# Patient Record
Sex: FEMALE | Race: WHITE | Employment: FULL TIME | URBAN - METROPOLITAN AREA
[De-identification: names, ages, dates, MRNs, and addresses within clinical notes are randomized per-mention and may not be internally consistent; named-entity substitution may affect disease eponyms.]

---

## 2017-02-02 ENCOUNTER — OFFICE VISIT (OUTPATIENT)
Dept: OBGYN | Facility: CLINIC | Age: 32
End: 2017-02-02
Payer: COMMERCIAL

## 2017-02-02 VITALS
BODY MASS INDEX: 45.79 KG/M2 | HEART RATE: 118 BPM | DIASTOLIC BLOOD PRESSURE: 88 MMHG | WEIGHT: 293 LBS | SYSTOLIC BLOOD PRESSURE: 148 MMHG | OXYGEN SATURATION: 96 %

## 2017-02-02 DIAGNOSIS — D06.9 CIN III (CERVICAL INTRAEPITHELIAL NEOPLASIA III): ICD-10-CM

## 2017-02-02 DIAGNOSIS — R87.619 ABNORMAL CERVICAL PAPANICOLAOU SMEAR, UNSPECIFIED ABNORMAL PAP FINDING: Primary | ICD-10-CM

## 2017-02-02 PROCEDURE — 88305 TISSUE EXAM BY PATHOLOGIST: CPT | Performed by: OBSTETRICS & GYNECOLOGY

## 2017-02-02 PROCEDURE — 57454 BX/CURETT OF CERVIX W/SCOPE: CPT | Performed by: OBSTETRICS & GYNECOLOGY

## 2017-02-02 PROCEDURE — 99243 OFF/OP CNSLTJ NEW/EST LOW 30: CPT | Mod: 25 | Performed by: OBSTETRICS & GYNECOLOGY

## 2017-02-02 NOTE — NURSING NOTE
"Chief Complaint   Patient presents with     Colposcopy       Initial /88 mmHg  Pulse 118  Wt 299 lb (135.626 kg)  SpO2 96%  LMP 12/25/2016 Estimated body mass index is 45.79 kg/(m^2) as calculated from the following:    Height as of 11/25/16: 5' 7.75\" (1.721 m).    Weight as of this encounter: 299 lb (135.626 kg).  BP completed using cuff size: mark Rome LPN    "

## 2017-02-02 NOTE — PROGRESS NOTES
Patient Name: Yvonne Jordan              Date: 2/2/2017   YOB: 1985                         Age: 31 year old   Phone: [phone] (home)   ________________________________________________________________________  I have been asked to see Yvonne in consultation by Sophia Gallegos  to discuss the pap smear, findings and possible further evaluation.  The patient's pap history is as follows:  12/2005:Pap--ASC-US +HPV  1/2006:[place]--HSIL, NATHAN 2-3  2/2006:[facility]--HSIL  7/2006:[place]--NATHAN 1  2/2007:Pap--LSIL  3/2007:[place]  8/2007:Pap--LSIL  10/2007:Pap--LSIL  12/2007:[place]--Negative. Rpt in 6 mon 8/2008:Pap--LSIL  12/21/09:Pap--NIL. Rpt in 6 mon.  9/29/10:Pap--NIL. Rpt in 6 mon.  7/28/11:Pap--NIL. Rpt in one year. In , episode, history and problem list updated, reminder sent.   2/16/12: Pap--NIL. Repeat pap in 1 year. Pap tracking episode resolved.  10/31/14: ASCUS Pap, + HR HPV. ([facility])  12/31/14: [place] Bx - NATHAN 1, ECC - negative ([facility])  8/28/15: Pap - NIL, + HR HPV. Plan cotest in 1 year.  11/25/16: NIL Pap, + HR HPV. Plan colp    I attempted to ensure that the patient was educated regarding the nature of her findings and implications to date.  We reviewed the role of HPV, incidence in the population and the natural history of the infection, and its transmission.  We also reviewed ways to minimize her future risk, the effect of HPV on the cervix and treatment options available, should they be indicated.    The pathophysiology of the cervix, including a discussion of the squamous and columnar cells, metaplasia and dysplasia have been reviewed, drawings, sketches and the pamphlets were reviewed with her.      Patient's last menstrual period was 12/25/2016.  Current Birth Control Method: abstinence  Family History of  Cervical, Uterine or Vaginal Cancer?: No    Past Medical History   Diagnosis Date     Menstrual migraine      Abnormal Pap smear      S/P [facility] biopsy of cervix 2/2006     HSIL NATHAN 2-3      Thyroid disease      Cervical high risk HPV (human papillomavirus) test positive 8/2015, 11/25/16     Neg 16/18       Past Surgical History   Procedure Laterality Date     Mouth surgery       wisdom teeth extraction     Cosmetic mammoplasty augmentation       Colposcopy, biopsy, combined  2009     Conization  2005        Outpatient Encounter Prescriptions as of 2/2/2017   Medication Sig Dispense Refill     lisinopril-hydrochlorothiazide (PRINZIDE,ZESTORETIC) 10-12.5 MG per tablet Take 1 tablet by mouth daily 90 tablet 3     multivitamin, therapeutic with minerals (MULTI-VITAMIN) TABS Take 1 tablet by mouth daily       levothyroxine (SYNTHROID, LEVOTHROID) 50 MCG tablet Take 1 tablet (50 mcg) by mouth daily 90 tablet 3     No facility-administered encounter medications on file as of 2/2/2017.        Allergies as of 02/02/2017 - reviewed 02/02/2017   Allergen Reaction Noted     Nkda [no known drug allergies]  12/28/2010       Social History     Social History     Marital Status: Single     Spouse Name: N/A     Number of Children: N/A     Years of Education: N/A     Social History Main Topics     Smoking status: Never Smoker      Smokeless tobacco: Never Used     Alcohol Use: Yes      Comment: very rare     Drug Use: No     Sexual Activity:     Partners: Male     Birth Control/ Protection: Condom      Comment: Microgestin FE BCP     Other Topics Concern      Service No     Blood Transfusions No     Caffeine Concern No     coffee 1 cup per day; pop 1 per day     Occupational Exposure No     Hobby Hazards No     Sleep Concern Yes     Stress Concern Yes     Weight Concern Yes     Special Diet No     Back Care No     Exercise Yes     2 x per wk- WII fit     Bike Helmet Not Asked     N/A     Seat Belt Yes     100%     Self-Exams Yes     Social History Narrative        Family History   Problem Relation Age of Onset     Arthritis Mother      Thyroid Disease Mother      CANCER Maternal Grandmother      Hypertension  Maternal Grandmother      CANCER Paternal Grandmother      Non-Hodgkins Lymphoma     CANCER Paternal Grandfather      Hypertension Father          Review Of Systems  Skin: negative  Eyes: negative  Ears/Nose/Throat: negative  Respiratory: No shortness of breath, dyspnea on exertion, cough, or hemoptysis and negative  Cardiovascular: negative  Gastrointestinal: negative  Genitourinary: negative  Musculoskeletal: negative  Neurologic: negative  Psychiatric: negative  Hematologic/Lymphatic/Immunologic: negative  Endocrine:Irreguler menses. Off OCP. No menses since 12/16    Exam:   /88 mmHg  Pulse 118  Wt 299 lb (135.626 kg)  SpO2 96%  LMP 12/25/2016  GENERAL:  WNWD female NAD  HEENT: NC/AT, EOMI  SKIN: normal skin turgor  GAIT: Normal  NECK: Symmetrical, no masses noted   VULVA: Normal Genitalia  BUS: Normal  URETHRA:  No hypermobility noted  URETHRAL MEATUS:  No masses noted  VAGINA: Normal mucosa, no discharge  CERVIX: Closed, mobile, no discharge  PERIANAL:  No masses or lesions seen  EXTREMITIES: no clubbing, cyanosis, or edema    Assessment:    ICD-10-CM    1. Abnormal cervical Papanicolaou smear, unspecified abnormal pap finding R87.619      Positive HPV    Plan:  Recommend to Proceed with Colpo  The details of the colposcopic procedure were reviewed, the risks of missed diagnoses, pain, infection, and bleeding.    TT 30 min, in addition to the time for the procedure  CT greater than 50%, as noted above in the HPI and in the Plan.     CEPHAS AGBEH, MD.          Procedure:  Speculum placed in vagina and excellent visualization of cervix achieved, cervix swabbed  with acetic acid solution.    FINDINGS:  Cervix: acetowhite lesion(s) noted at 11 o'clock; endocervical curettage performed, cervical biopsies taken at 11 o'clock, specimen labelled and sent to pathology and hemostasis achieved with Monsel's solution.    Vaginal inspection: vaginal colposcopy not performed.    Vulvar colposcopy: vulvar colposcopy  not performed.    Procedure Summary: Patient tolerated procedure well and colposcopy adequate.    Colposcopic Impression: HPV    ICD-10-CM    1. Abnormal cervical Papanicolaou smear, unspecified abnormal pap finding R87.619 Surgical pathology exam     COLP CERVIX/UPPER VAGINA W BX CERVIX/ENDOCERV CURETT       Plan: Specimens labelled and sent to pathology., Will base further treatment on pathology findings., treatment options discussed with patient, post biopsy instructions given to patient and call to discuss Pathology results.  Repeat pap/cotest in one year  Will return to discuss irregular menses if persists.  CEPHAS AGBEH, MD.

## 2017-02-06 LAB — COPATH REPORT: NORMAL

## 2017-02-07 ENCOUNTER — TELEPHONE (OUTPATIENT)
Dept: FAMILY MEDICINE | Facility: CLINIC | Age: 32
End: 2017-02-07

## 2017-02-07 NOTE — TELEPHONE ENCOUNTER
Mom called, patient has been playing phone tag for colposcopy results, so asking mom to get results for her, consent to communicate in chart.  Below results given, mom voiced understanding and agreement.  Lubna Menard RN    Notes Recorded by Agbeh, Cephas Mawuena, MD on 2/6/2017 at 4:14 PM    Your COLPOSCOPY/BIOPSY results are normal .Consistent with your pap. NO CANCER.  Return to clinic for pap in 12  Months. Co test.  CEPHAS AGBEH, MD.

## 2017-05-11 ENCOUNTER — TELEPHONE (OUTPATIENT)
Dept: OBGYN | Facility: CLINIC | Age: 32
End: 2017-05-11

## 2017-05-11 NOTE — TELEPHONE ENCOUNTER
Pt concerned has not had period  Over last 3 month  No PG  Informed by staff medication available for induced period  Please order to [facility]  Thanks   DR AGBEH

## 2017-05-12 NOTE — TELEPHONE ENCOUNTER
"Patient requests medication to bring on a period. She stated she is not currently sexually active so she knows she is not pregnant. Patient is uncertain what her LMP is and thought \"either the end of Jan or the beginning of Feb.\" Reviewed last office visit notes on 02-02-17. LMP reported was 12-25-16 so patient probably had last period shortly after her appt with Dr. Agbeh on 02-02-17. Patient stated she has had no vaginal bleeding and doesn't really feel like her menses is going to start. She notes occasional abdominal cramping. Patient stated she has a hx of irregular menses.  Explained that Dr. Agbeh is on call at the hospital today and do not know if he would give her an Rx to produce menses or not. Explained will send a message and but depending how busy he is at [facility], he may not be able to respond today and then it will be next week before staff calls with orders. Explained patient may need to be seen. Verified pharmacy.  Will route to  for review & orders. Ingrid Barboza RN, BAN        "

## 2017-06-26 DIAGNOSIS — N91.2 AMENORRHEA: Primary | ICD-10-CM

## 2017-06-30 ENCOUNTER — RADIANT APPOINTMENT (OUTPATIENT)
Dept: ULTRASOUND IMAGING | Facility: CLINIC | Age: 32
End: 2017-06-30
Attending: NURSE PRACTITIONER
Payer: COMMERCIAL

## 2017-06-30 DIAGNOSIS — N91.2 AMENORRHEA: ICD-10-CM

## 2017-06-30 PROCEDURE — 76856 US EXAM PELVIC COMPLETE: CPT

## 2017-06-30 PROCEDURE — 76830 TRANSVAGINAL US NON-OB: CPT

## 2017-07-06 ENCOUNTER — TELEPHONE (OUTPATIENT)
Dept: OBGYN | Facility: CLINIC | Age: 32
End: 2017-07-06

## 2017-07-06 DIAGNOSIS — N91.2 AMENORRHEA: Primary | ICD-10-CM

## 2017-07-06 NOTE — TELEPHONE ENCOUNTER
Had u/s and would like you to look at results and advise as to what labs should be done, and put in lab orders,  before I make an appointment to see you. Please leave information on voicemail if I cannot answer. Thank you!

## 2017-07-10 NOTE — PROGRESS NOTES
Isaias Russell,    Thank you for your recent office visit.    Here are your recent results.  Normal ultrasound results from what they could see on ultrasound; however they were not able to visualize your ovaries, which is what I was really wanting to see.  I think you should be evaluated by OB/GYN to see if they want you to have a CT scan or other testing for your symptoms.  Please let me know what you think.     Feel free to contact me via [employer] or call the clinic at [phone].    Sincerely,    CHEN Mix, FNP-BC

## 2017-07-12 DIAGNOSIS — N91.2 AMENORRHEA: ICD-10-CM

## 2017-07-12 LAB
ERYTHROCYTE [DISTWIDTH] IN BLOOD BY AUTOMATED COUNT: 13.7 % (ref 10–15)
HBA1C MFR BLD: 6.2 % (ref 4.3–6)
HCT VFR BLD AUTO: 39.3 % (ref 35–47)
HGB BLD-MCNC: 12.4 G/DL (ref 11.7–15.7)
MCH RBC QN AUTO: 30.8 PG (ref 26.5–33)
MCHC RBC AUTO-ENTMCNC: 31.6 G/DL (ref 31.5–36.5)
MCV RBC AUTO: 98 FL (ref 78–100)
PLATELET # BLD AUTO: 211 10E9/L (ref 150–450)
RBC # BLD AUTO: 4.03 10E12/L (ref 3.8–5.2)
WBC # BLD AUTO: 10.3 10E9/L (ref 4–11)

## 2017-07-12 PROCEDURE — 83001 ASSAY OF GONADOTROPIN (FSH): CPT | Performed by: OBSTETRICS & GYNECOLOGY

## 2017-07-12 PROCEDURE — 83002 ASSAY OF GONADOTROPIN (LH): CPT | Performed by: OBSTETRICS & GYNECOLOGY

## 2017-07-12 PROCEDURE — 84702 CHORIONIC GONADOTROPIN TEST: CPT | Performed by: OBSTETRICS & GYNECOLOGY

## 2017-07-12 PROCEDURE — 83036 HEMOGLOBIN GLYCOSYLATED A1C: CPT | Performed by: OBSTETRICS & GYNECOLOGY

## 2017-07-12 PROCEDURE — 84270 ASSAY OF SEX HORMONE GLOBUL: CPT | Performed by: OBSTETRICS & GYNECOLOGY

## 2017-07-12 PROCEDURE — 84144 ASSAY OF PROGESTERONE: CPT | Performed by: OBSTETRICS & GYNECOLOGY

## 2017-07-12 PROCEDURE — 80053 COMPREHEN METABOLIC PANEL: CPT | Performed by: OBSTETRICS & GYNECOLOGY

## 2017-07-12 PROCEDURE — 84146 ASSAY OF PROLACTIN: CPT | Performed by: OBSTETRICS & GYNECOLOGY

## 2017-07-12 PROCEDURE — 82670 ASSAY OF TOTAL ESTRADIOL: CPT | Performed by: OBSTETRICS & GYNECOLOGY

## 2017-07-12 PROCEDURE — 84403 ASSAY OF TOTAL TESTOSTERONE: CPT | Performed by: OBSTETRICS & GYNECOLOGY

## 2017-07-12 PROCEDURE — 82627 DEHYDROEPIANDROSTERONE: CPT | Performed by: OBSTETRICS & GYNECOLOGY

## 2017-07-12 PROCEDURE — 85027 COMPLETE CBC AUTOMATED: CPT | Performed by: OBSTETRICS & GYNECOLOGY

## 2017-07-12 PROCEDURE — 84443 ASSAY THYROID STIM HORMONE: CPT | Performed by: OBSTETRICS & GYNECOLOGY

## 2017-07-12 PROCEDURE — 36415 COLL VENOUS BLD VENIPUNCTURE: CPT | Performed by: OBSTETRICS & GYNECOLOGY

## 2017-07-13 LAB
ALBUMIN SERPL-MCNC: 3.7 G/DL (ref 3.4–5)
ALP SERPL-CCNC: 51 U/L (ref 40–150)
ALT SERPL W P-5'-P-CCNC: 70 U/L (ref 0–50)
ANION GAP SERPL CALCULATED.3IONS-SCNC: 12 MMOL/L (ref 3–14)
AST SERPL W P-5'-P-CCNC: 77 U/L (ref 0–45)
B-HCG SERPL-ACNC: <1 IU/L (ref 0–5)
BILIRUB SERPL-MCNC: 0.4 MG/DL (ref 0.2–1.3)
BUN SERPL-MCNC: 11 MG/DL (ref 7–30)
CALCIUM SERPL-MCNC: 9.6 MG/DL (ref 8.5–10.1)
CHLORIDE SERPL-SCNC: 103 MMOL/L (ref 94–109)
CO2 SERPL-SCNC: 24 MMOL/L (ref 20–32)
CREAT SERPL-MCNC: 0.61 MG/DL (ref 0.52–1.04)
ESTRADIOL SERPL-MCNC: 133 PG/ML
FSH SERPL-ACNC: 4.3 IU/L
GFR SERPL CREATININE-BSD FRML MDRD: ABNORMAL ML/MIN/1.7M2
GLUCOSE SERPL-MCNC: 86 MG/DL (ref 70–99)
LH SERPL-ACNC: 16.9 IU/L
POTASSIUM SERPL-SCNC: 4 MMOL/L (ref 3.4–5.3)
PROGEST SERPL-MCNC: 1 NG/ML
PROLACTIN SERPL-MCNC: 14 UG/L (ref 3–27)
PROT SERPL-MCNC: 8.1 G/DL (ref 6.8–8.8)
SODIUM SERPL-SCNC: 139 MMOL/L (ref 133–144)
TSH SERPL DL<=0.05 MIU/L-ACNC: 6.88 MU/L (ref 0.4–4)

## 2017-07-14 LAB
DHEA-S SERPL-MCNC: 205 UG/DL (ref 35–430)
SHBG SERPL-SCNC: 24 NMOL/L (ref 30–135)
TESTOST FREE SERPL-MCNC: 0.85 NG/DL (ref 0.13–0.92)
TESTOST SERPL-MCNC: 40 NG/DL (ref 8–60)

## 2017-07-19 ENCOUNTER — TELEPHONE (OUTPATIENT)
Dept: OBGYN | Facility: CLINIC | Age: 32
End: 2017-07-19

## 2017-07-19 NOTE — TELEPHONE ENCOUNTER
Entered by Agbeh, Cephas Mawuena, MD at 7/14/2017  1:55 PM   Read by Yvonne Jordan at 7/14/2017  4:45 PM   Ms. Jordan,     Please follow up in office to discuss your test results.          This writer attempted to contact Yvonne on 07/19/17      Reason for call returning her call and left message to return call.      When patient calls back, please contact clinic RN team. If no one available, document that pt called and route to care team. routine priority.      Ingrid Barboza RN

## 2017-07-19 NOTE — TELEPHONE ENCOUNTER
labs completed Mychart indicated high levels will there be an increase in medication dose. not seeing provider until 7/28 DR AGBEH  Thanks if increase [facility]   Thank you

## 2017-07-19 NOTE — TELEPHONE ENCOUNTER
Phone call from patient. She stated she is particularly concerned about ALT/AST. Explained those are liver tests and not sure what Dr. Agbeh is going to advised but he clearly stated he wanted to discuss face-to-face at upcoming appt.  Questioned if patient is concerned about TSH. Patient stated she did note that it was elevated. Explained not sure Dr. Agbeh will want to monitor that as previously her PCP had ordered labs and levothyroxine. Recommended she contact PCP team and have a message sent to her to see if she can adjust her dose. Patient stated she just filled her 90 day levothyroxine Rx but she said she would follow advise. Ingrid Barboza RN, BAN

## 2017-07-28 ENCOUNTER — OFFICE VISIT (OUTPATIENT)
Dept: OBGYN | Facility: CLINIC | Age: 32
End: 2017-07-28
Payer: COMMERCIAL

## 2017-07-28 VITALS
WEIGHT: 293 LBS | SYSTOLIC BLOOD PRESSURE: 133 MMHG | HEART RATE: 104 BPM | TEMPERATURE: 99.7 F | OXYGEN SATURATION: 95 % | DIASTOLIC BLOOD PRESSURE: 88 MMHG | BODY MASS INDEX: 47.79 KG/M2

## 2017-07-28 DIAGNOSIS — E03.9 HYPOTHYROIDISM, UNSPECIFIED TYPE: ICD-10-CM

## 2017-07-28 DIAGNOSIS — E66.9 OBESITY, UNSPECIFIED OBESITY SEVERITY, UNSPECIFIED OBESITY TYPE: ICD-10-CM

## 2017-07-28 DIAGNOSIS — E28.2 PCOS (POLYCYSTIC OVARIAN SYNDROME): Primary | ICD-10-CM

## 2017-07-28 LAB
ALBUMIN SERPL-MCNC: 3.9 G/DL (ref 3.4–5)
ALP SERPL-CCNC: 48 U/L (ref 40–150)
ALT SERPL W P-5'-P-CCNC: 84 U/L (ref 0–50)
ANION GAP SERPL CALCULATED.3IONS-SCNC: 14 MMOL/L (ref 3–14)
AST SERPL W P-5'-P-CCNC: 114 U/L (ref 0–45)
BILIRUB SERPL-MCNC: 0.4 MG/DL (ref 0.2–1.3)
BUN SERPL-MCNC: 15 MG/DL (ref 7–30)
CALCIUM SERPL-MCNC: 9.8 MG/DL (ref 8.5–10.1)
CHLORIDE SERPL-SCNC: 102 MMOL/L (ref 94–109)
CO2 SERPL-SCNC: 24 MMOL/L (ref 20–32)
CREAT SERPL-MCNC: 0.63 MG/DL (ref 0.52–1.04)
GFR SERPL CREATININE-BSD FRML MDRD: ABNORMAL ML/MIN/1.7M2
GLUCOSE SERPL-MCNC: 93 MG/DL (ref 70–99)
HBA1C MFR BLD: 6.1 % (ref 4.3–6)
POTASSIUM SERPL-SCNC: 4.1 MMOL/L (ref 3.4–5.3)
PROT SERPL-MCNC: 8.6 G/DL (ref 6.8–8.8)
SODIUM SERPL-SCNC: 140 MMOL/L (ref 133–144)
T4 FREE SERPL-MCNC: 1.19 NG/DL (ref 0.76–1.46)
TSH SERPL DL<=0.005 MIU/L-ACNC: 5.91 MU/L (ref 0.4–4)

## 2017-07-28 PROCEDURE — 99214 OFFICE O/P EST MOD 30 MIN: CPT | Performed by: OBSTETRICS & GYNECOLOGY

## 2017-07-28 PROCEDURE — 83036 HEMOGLOBIN GLYCOSYLATED A1C: CPT | Performed by: OBSTETRICS & GYNECOLOGY

## 2017-07-28 PROCEDURE — 99000 SPECIMEN HANDLING OFFICE-LAB: CPT | Performed by: OBSTETRICS & GYNECOLOGY

## 2017-07-28 PROCEDURE — 87350 HEPATITIS BE AG IA: CPT | Mod: 90 | Performed by: OBSTETRICS & GYNECOLOGY

## 2017-07-28 PROCEDURE — 84439 ASSAY OF FREE THYROXINE: CPT | Performed by: OBSTETRICS & GYNECOLOGY

## 2017-07-28 PROCEDURE — 36415 COLL VENOUS BLD VENIPUNCTURE: CPT | Performed by: OBSTETRICS & GYNECOLOGY

## 2017-07-28 PROCEDURE — 84443 ASSAY THYROID STIM HORMONE: CPT | Performed by: OBSTETRICS & GYNECOLOGY

## 2017-07-28 PROCEDURE — 86707 HEPATITIS BE ANTIBODY: CPT | Mod: 90 | Performed by: OBSTETRICS & GYNECOLOGY

## 2017-07-28 PROCEDURE — 80053 COMPREHEN METABOLIC PANEL: CPT | Performed by: OBSTETRICS & GYNECOLOGY

## 2017-07-28 NOTE — NURSING NOTE
"Chief Complaint   Patient presents with     Consult     PCOS       Initial /88 (BP Location: Right arm, Patient Position: Chair, Cuff Size: Adult Large)  Pulse 104  Temp 99.7  F (37.6  C) (Tympanic)  Wt (!) 312 lb (141.5 kg)  LMP 07/24/2017  SpO2 95%  BMI 47.79 kg/m2 Estimated body mass index is 47.79 kg/(m^2) as calculated from the following:    Height as of 11/25/16: 5' 7.75\" (1.721 m).    Weight as of this encounter: 312 lb (141.5 kg).  Medication Reconciliation: complete     Deborah Rome LPN    "

## 2017-07-28 NOTE — PROGRESS NOTES
Yvonne is a 32 year old  referred here by self for consultation regarding PCOS discussion and her elevated ALT/AST. Hg1c and TSH.  She has been on thyroid medication for a number of years. She was started on antihypertensives about 8 months ago.  Has long H/O of irregular menses, oligomenorhrrea, Infertility, obesity. Has nevver been diagnosed with PCOS.    LMP was last week.    ROS: Ten point review of systems was reviewed and negative except the above.    Gyne: + abn pap (last pap ), - STD's    Past Medical History:   Diagnosis Date     Abnormal Pap smear      Cervical high risk HPV (human papillomavirus) test positive 2015, 16    Neg      Hx of colposcopy with cervical biopsy 17    Bx & ECC - negative     Menstrual migraine      S/P cone biopsy of cervix 2006    HSIL NATHAN 2-3     Thyroid disease      Past Surgical History:   Procedure Laterality Date     COLPOSCOPY, BIOPSY, COMBINED       CONIZATION       COSMETIC MAMMOPLASTY AUGMENTATION       MOUTH SURGERY      wisdom teeth extraction     Patient Active Problem List   Diagnosis     Hyperlipidemia LDL goal <160     Hypothyroidism     Obesity     Irregular menses     S/P breast augmentation     NATHAN 3 - cervical intraepithelial neoplasia grade 3     Health Care Home     Essential hypertension with goal blood pressure less than 130/80       ALL/Meds: Her medication and allergy histories were reviewed and are documented in their appropriate chart areas.    SH: - tob, - EtOH,     FH: Her family history was reviewed and documented in its appropriate chart area.    PE: /88 (BP Location: Right arm, Patient Position: Chair, Cuff Size: Adult Large)  Pulse 104  Temp 99.7  F (37.6  C) (Tympanic)  Wt (!) 312 lb (141.5 kg)  LMP 2017  SpO2 95%  BMI 47.79 kg/m2  Body mass index is 47.79 kg/(m^2).      General:  WNWD female, NAD  Alert  Oriented x 3  Gait:  Normal  Skin:  Normal skin turgor  HEENT:  NC/AT, EOMI  Abdomen:   Non-tender, non-distended.  Pelvic exam:  Not performed  Extremities:  No clubbing, no cyanosis and no edema.    Results for orders placed or performed in visit on 07/28/17   Hemoglobin A1c   Result Value Ref Range    Hemoglobin A1C 6.1 (H) 4.3 - 6.0 %       A/P      ICD-10-CM    1. PCOS (polycystic ovarian syndrome) E28.2 Comprehensive metabolic panel     Hemoglobin A1c     TSH with free T4 reflex     Hepatitis Be antigen     Hepatitis Be antibody     Hepatitis A antibody IgM     Hepatitis B Surface Antibody     Hepatitis B core antibody IgM     Hepatitis C High Resolution Genotype     Hepatitis C Screen Reflex to HCV RNA Quant and Genotype     Hepatitis B core antibody     CANCELED: Hepatitis A antibody IgM     CANCELED: Hepatitis B Surface Antibody     CANCELED: Hepatitis B core antibody IgM     CANCELED: Hepatitis B core antibody     CANCELED: Hepatitis C High Resolution Genotype     CANCELED: Hepatitis C Screen Reflex to HCV RNA Quant and Genotype   2. Hypothyroidism, unspecified type E03.9 Comprehensive metabolic panel     Hemoglobin A1c     TSH with free T4 reflex     Hepatitis Be antigen     Hepatitis Be antibody     Hepatitis A antibody IgM     Hepatitis B Surface Antibody     Hepatitis B core antibody IgM     Hepatitis C High Resolution Genotype     Hepatitis C Screen Reflex to HCV RNA Quant and Genotype     Hepatitis B core antibody     CANCELED: Hepatitis A antibody IgM     CANCELED: Hepatitis B Surface Antibody     CANCELED: Hepatitis B core antibody IgM     CANCELED: Hepatitis B core antibody     CANCELED: Hepatitis C High Resolution Genotype     CANCELED: Hepatitis C Screen Reflex to HCV RNA Quant and Genotype   3. Obesity, unspecified obesity severity, unspecified obesity type E66.9      Discussed at length the diagnosis and treatment plans for polycystic ovary syndrome.  Discussed PCOS as a syndrome that originates with increased insulin resistance which leads to hyperandrogenism, irregular  menstruation, and polycystic appearing ovaries and impaired ovulation.  Discussed lifelong implications such as endometrial hyperplasia, lipid abnormalities, diabetes, and increased risk of metabolic syndrome.    Discussed treatment plan depend on patient's desire to conceive.  If no desire to conceive, then recommended hormonal contraception.  If desires to conceive, then recommended weight loss, regular menstruation with progestins, and possible use of metformin as insulin sensitizing agent.  Discussed possibility of using clomid if difficulty conceiving, but warned of increased risk of multiple gestation.    Will await repeat labs to proceed with metformin treatment.  If liver enzymes and thyroid las are still elevated , II will have her see her PCP.  ACOG pamphlet was provided on the above topics.    25 minutes was spent face to face with the patient today discussing her history, diagnosis, and follow-up plan as noted above.  Over 50% of the visit was spent in counseling and coordination of care.    Total Visit Time: 30 minutes.      CEPHAS AGBEH, MD.

## 2017-07-30 LAB
HBV E AB SERPL QL IA: NORMAL
HBV E AG SERPL QL IA: NORMAL

## 2017-07-31 ENCOUNTER — OFFICE VISIT (OUTPATIENT)
Dept: FAMILY MEDICINE | Facility: CLINIC | Age: 32
End: 2017-07-31
Payer: COMMERCIAL

## 2017-07-31 VITALS
DIASTOLIC BLOOD PRESSURE: 81 MMHG | SYSTOLIC BLOOD PRESSURE: 133 MMHG | HEART RATE: 108 BPM | OXYGEN SATURATION: 99 % | TEMPERATURE: 98.4 F

## 2017-07-31 DIAGNOSIS — E03.9 ACQUIRED HYPOTHYROIDISM: ICD-10-CM

## 2017-07-31 DIAGNOSIS — E03.9 HYPOTHYROIDISM, UNSPECIFIED TYPE: ICD-10-CM

## 2017-07-31 DIAGNOSIS — E28.2 PCOS (POLYCYSTIC OVARIAN SYNDROME): ICD-10-CM

## 2017-07-31 DIAGNOSIS — E66.9 OBESITY, UNSPECIFIED OBESITY SEVERITY, UNSPECIFIED OBESITY TYPE: ICD-10-CM

## 2017-07-31 DIAGNOSIS — R73.03 PREDIABETES: ICD-10-CM

## 2017-07-31 DIAGNOSIS — I10 ESSENTIAL HYPERTENSION WITH GOAL BLOOD PRESSURE LESS THAN 130/80: ICD-10-CM

## 2017-07-31 PROCEDURE — 99214 OFFICE O/P EST MOD 30 MIN: CPT | Performed by: PHYSICIAN ASSISTANT

## 2017-07-31 PROCEDURE — 36415 COLL VENOUS BLD VENIPUNCTURE: CPT | Performed by: PHYSICIAN ASSISTANT

## 2017-07-31 PROCEDURE — 83516 IMMUNOASSAY NONANTIBODY: CPT | Mod: 59 | Performed by: PHYSICIAN ASSISTANT

## 2017-07-31 PROCEDURE — 83690 ASSAY OF LIPASE: CPT | Performed by: PHYSICIAN ASSISTANT

## 2017-07-31 PROCEDURE — 86803 HEPATITIS C AB TEST: CPT | Performed by: PHYSICIAN ASSISTANT

## 2017-07-31 PROCEDURE — 87340 HEPATITIS B SURFACE AG IA: CPT | Performed by: PHYSICIAN ASSISTANT

## 2017-07-31 PROCEDURE — 82977 ASSAY OF GGT: CPT | Performed by: PHYSICIAN ASSISTANT

## 2017-07-31 PROCEDURE — 86706 HEP B SURFACE ANTIBODY: CPT | Performed by: PHYSICIAN ASSISTANT

## 2017-07-31 PROCEDURE — 99000 SPECIMEN HANDLING OFFICE-LAB: CPT | Performed by: PHYSICIAN ASSISTANT

## 2017-07-31 PROCEDURE — 83516 IMMUNOASSAY NONANTIBODY: CPT | Performed by: PHYSICIAN ASSISTANT

## 2017-07-31 PROCEDURE — 86709 HEPATITIS A IGM ANTIBODY: CPT | Performed by: PHYSICIAN ASSISTANT

## 2017-07-31 PROCEDURE — 87902 NFCT AGT GNTYP ALYS HEP C: CPT | Mod: 90 | Performed by: PHYSICIAN ASSISTANT

## 2017-07-31 PROCEDURE — 86704 HEP B CORE ANTIBODY TOTAL: CPT | Performed by: PHYSICIAN ASSISTANT

## 2017-07-31 PROCEDURE — 86705 HEP B CORE ANTIBODY IGM: CPT | Performed by: PHYSICIAN ASSISTANT

## 2017-07-31 RX ORDER — METFORMIN HCL 500 MG
500 TABLET, EXTENDED RELEASE 24 HR ORAL
Qty: 90 TABLET | Refills: 1 | Status: SHIPPED | OUTPATIENT
Start: 2017-07-31 | End: 2017-12-28

## 2017-07-31 RX ORDER — FELODIPINE 5 MG/1
5 TABLET, EXTENDED RELEASE ORAL DAILY
Qty: 30 TABLET | Refills: 1 | Status: SHIPPED | OUTPATIENT
Start: 2017-07-31 | End: 2017-08-25 | Stop reason: SINTOL

## 2017-07-31 RX ORDER — LEVOTHYROXINE SODIUM 75 UG/1
75 TABLET ORAL DAILY
Qty: 60 TABLET | Refills: 0 | Status: SHIPPED | OUTPATIENT
Start: 2017-07-31 | End: 2017-09-05

## 2017-07-31 NOTE — PROGRESS NOTES
SUBJECTIVE:                                                    Yvonne Jordan is a 32 year old female who presents to clinic today for the following health issues:      Discuss lab results/hypertension.  tsh remains elevated.   ALT/AST  Weight gain.   Fatigue.  Bowel function : no diarrhea or constipation.     Problem list and histories reviewed & adjusted, as indicated.  Additional history: as documented    Patient Active Problem List   Diagnosis     Hyperlipidemia LDL goal <160     Hypothyroidism     Obesity     Irregular menses     S/P breast augmentation     NATHAN 3 - cervical intraepithelial neoplasia grade 3     Health Care Home     Essential hypertension with goal blood pressure less than 130/80     Past Surgical History:   Procedure Laterality Date     COLPOSCOPY, BIOPSY, COMBINED  2009     CONIZATION  2005     COSMETIC MAMMOPLASTY AUGMENTATION       MOUTH SURGERY      wisdom teeth extraction       Social History   Substance Use Topics     Smoking status: Never Smoker     Smokeless tobacco: Never Used     Alcohol use Yes      Comment: very rare     Family History   Problem Relation Age of Onset     Arthritis Mother      Thyroid Disease Mother      Hypertension Father      CANCER Maternal Grandmother      Hypertension Maternal Grandmother      CANCER Paternal Grandmother      Non-Hodgkins Lymphoma     CANCER Paternal Grandfather          BP Readings from Last 3 Encounters:   07/31/17 133/81   07/28/17 133/88   02/02/17 148/88    Wt Readings from Last 3 Encounters:   07/28/17 (!) 312 lb (141.5 kg)   02/02/17 299 lb (135.6 kg)   11/25/16 (!) 300 lb 6.4 oz (136.3 kg)                        Reviewed and updated as needed this visit by clinical staffTobacco  Allergies  Meds  Med Hx  Surg Hx  Fam Hx  Soc Hx      Reviewed and updated as needed this visit by Provider         All other systems negative except as outline above  OBJECTIVE:  Eye exam - right eye normal lid, conjunctiva, cornea, pupil and fundus,  left eye normal lid, conjunctiva, cornea, pupil and fundus.  Thyroid not palpable, not enlarged, no nodules detected.  CHEST:chest clear to IPPA, no tachypnea, retractions or cyanosis and S1, S2 normal, no murmur, no gallop, rate regular.  The abdomen is soft without tenderness, guarding, mass, rebound or organomegaly. Bowel sounds are normal. No CVA tenderness or inguinal adenopathy noted.  No edema    Yvonne was seen today for hypertension.    Diagnoses and all orders for this visit:    Elevation of level of transaminase or lactic acid dehydrogenase (LDH)  -     US Abdomen Limited; Future  -     GGT  -     Lipase  -     Hepatitis B surface antigen    Acquired hypothyroidism  -     levothyroxine (SYNTHROID/LEVOTHROID) 75 MCG tablet; Take 1 tablet (75 mcg) by mouth daily  -     TSH; Future  -     Tissue transglutaminase faina IgA and IgG  -     Cancel: TSH  -     TSH; Future    Prediabetes  -     metFORMIN (GLUCOPHAGE-XR) 500 MG 24 hr tablet; Take 1 tablet (500 mg) by mouth daily (with dinner)    PCOS (polycystic ovarian syndrome)  -     metFORMIN (GLUCOPHAGE-XR) 500 MG 24 hr tablet; Take 1 tablet (500 mg) by mouth daily (with dinner)  -     Hepatitis A antibody IgM  -     Hepatitis C Screen Reflex to HCV RNA Quant and Genotype  -     Hepatitis B Surface Antibody  -     Hepatitis B core antibody IgM  -     Hepatitis C High Resolution Genotype  -     Hepatitis B core antibody    Essential hypertension with goal blood pressure less than 130/80  -     felodipine ER (PLENDIL) 5 MG 24 hr tablet; Take 1 tablet (5 mg) by mouth daily    Hypothyroidism, unspecified type  -     Hepatitis A antibody IgM  -     Hepatitis C Screen Reflex to HCV RNA Quant and Genotype  -     Hepatitis B Surface Antibody  -     Hepatitis B core antibody IgM  -     Hepatitis C High Resolution Genotype  -     Hepatitis B core antibody    Obesity, unspecified obesity severity, unspecified obesity type      work on lifestyle modification  Recheck in 2  weeks to discuss diet changes and exercise prescription.

## 2017-07-31 NOTE — NURSING NOTE
"Chief Complaint   Patient presents with     Hypertension     discuss elevated blood pressure       Initial /81  Pulse 108  Temp 98.4  F (36.9  C) (Oral)  LMP 07/24/2017  SpO2 99% Estimated body mass index is 47.79 kg/(m^2) as calculated from the following:    Height as of 11/25/16: 5' 7.75\" (1.721 m).    Weight as of 7/28/17: 312 lb (141.5 kg).  Medication Reconciliation: complete     Amari Aleman CMA    "

## 2017-07-31 NOTE — MR AVS SNAPSHOT
After Visit Summary   7/31/2017    Yvonne Jordan    MRN: [id number]           Patient Information     Date Of Birth          1985        Visit Information        Provider Department      7/31/2017 5:40 PM Antonio Lance PA-C [facility]        Today's Diagnoses     Elevation of level of transaminase or lactic acid dehydrogenase (LDH)    -  1    Acquired hypothyroidism        Prediabetes        PCOS (polycystic ovarian syndrome)        Essential hypertension with goal blood pressure less than 130/80        Hypothyroidism, unspecified type           Follow-ups after your visit        Future tests that were ordered for you today     Open Future Orders        Priority Expected Expires Ordered    TSH Routine  7/31/2018 7/31/2017    US Abdomen Limited Routine  7/31/2018 7/31/2017            Who to contact     Normal or non-critical lab and imaging results will be communicated to you by [email or web address], letter or phone within 4 business days after the clinic has received the results. If you do not hear from us within 7 days, please contact the clinic through [email or web address] or phone. If you have a critical or abnormal lab result, we will notify you by phone as soon as possible.  Submit refill requests through [employer] or call your pharmacy and they will forward the refill request to us. Please allow 3 business days for your refill to be completed.          If you need to speak with a  for additional information , please call: [phone]             Additional Information About Your Visit        M[employer] Information     [employer] gives you secure access to your electronic health record. If you see a primary care provider, you can also send messages to your care team and make appointments. If you have questions, please call your primary care clinic.  If you do not have a primary care provider, please call [phone] and they will assist you.        Care EveryWhere ID     This is your Care  EveryWhere ID. This could be used by other organizations to access your [place] medical records  [id number]        Your Vitals Were     Pulse Temperature Last Period Pulse Oximetry          108 98.4  F (36.9  C) (Oral) 07/24/2017 99%         Blood Pressure from Last 3 Encounters:   07/31/17 133/81   07/28/17 133/88   02/02/17 148/88    Weight from Last 3 Encounters:   07/28/17 (!) 312 lb (141.5 kg)   02/02/17 299 lb (135.6 kg)   11/25/16 (!) 300 lb 6.4 oz (136.3 kg)              We Performed the Following     GGT     Hepatitis A antibody IgM     Hepatitis B Surface Antibody     Hepatitis B surface antigen     Hepatitis C Screen Reflex to HCV RNA Quant and Genotype     Lipase     Tissue transglutaminase faina IgA and IgG          Today's Medication Changes          These changes are accurate as of: 7/31/17  6:27 PM.  If you have any questions, ask your nurse or doctor.               Start taking these medicines.        Dose/Directions    felodipine ER 5 MG 24 hr tablet   Commonly known as:  PLENDIL   Used for:  Essential hypertension with goal blood pressure less than 130/80   Started by:  Antonio Lance PA-C        Dose:  5 mg   Take 1 tablet (5 mg) by mouth daily   Quantity:  30 tablet   Refills:  1       metFORMIN 500 MG 24 hr tablet   Commonly known as:  GLUCOPHAGE-XR   Used for:  Prediabetes, PCOS (polycystic ovarian syndrome)   Started by:  Antonio Lance PA-C        Dose:  500 mg   Take 1 tablet (500 mg) by mouth daily (with dinner)   Quantity:  90 tablet   Refills:  1         These medicines have changed or have updated prescriptions.        Dose/Directions    levothyroxine 75 MCG tablet   Commonly known as:  SYNTHROID/LEVOTHROID   This may have changed:    - medication strength  - how much to take   Used for:  Acquired hypothyroidism   Changed by:  Antonio Lance PA-C        Dose:  75 mcg   Take 1 tablet (75 mcg) by mouth daily   Quantity:  60 tablet   Refills:  0            Where to get your  medicines      These medications were sent to [facility] [place], [place] - [address] [address]  [address] [facility] [facility] [address]     Phone:  [phone]     felodipine ER 5 MG 24 hr tablet    levothyroxine 75 MCG tablet    metFORMIN 500 MG 24 hr tablet                Primary Care Provider Office Phone # Fax #    Sophia Gallegos [phone]       [facility] [address]  [facility] [address]        Equal Access to Services     LINDA HYDE : Hadii aad ku hadasho Soomaali, waaxda luqadaha, qaybta kaalmada adeegyada, waxay idiin hayaan adeeg kharash laroman . So [facility] [phone].    ATENCIÓN: Si habla español, tiene a velasquez disposición servicios gratuitos de asistencia lingüística. [facility] [phone].    We comply with applicable federal civil rights laws and [place] laws. We do not discriminate on the basis of race, color, national origin, age, disability sex, sexual orientation or gender identity.            Thank you!     Thank you for choosing [facility]  for your care. Our goal is always to provide you with excellent care. Hearing back from our patients is one way we can continue to improve our services. Please take a few minutes to complete the written survey that you may receive in the mail after your visit with us. Thank you!             Your Updated Medication List - Protect others around you: Learn how to safely use, store and throw away your medicines at www.disposemymeds.org.          This list is accurate as of: 7/31/17  6:27 PM.  Always use your most recent med list.                   Brand Name Dispense Instructions for use Diagnosis    felodipine ER 5 MG 24 hr tablet    PLENDIL    30 tablet    Take 1 tablet (5 mg) by mouth daily    Essential hypertension with goal blood pressure less than 130/80       levothyroxine 75 MCG tablet    SYNTHROID/LEVOTHROID    60 tablet    Take 1 tablet (75 mcg) by mouth daily    Acquired hypothyroidism        metFORMIN 500 MG 24 hr tablet    GLUCOPHAGE-XR    90 tablet    Take 1 tablet (500 mg) by mouth daily (with dinner)    Prediabetes, PCOS (polycystic ovarian syndrome)       Multi-vitamin Tabs tablet      Take 1 tablet by mouth daily

## 2017-08-01 LAB
GGT SERPL-CCNC: 72 U/L (ref 0–40)
HAV IGM SERPL QL IA: NORMAL
HBV CORE AB SERPL QL IA: NONREACTIVE
HBV CORE IGM SERPL QL IA: NORMAL
HBV SURFACE AB SERPL IA-ACNC: 169.43 M[IU]/ML
HBV SURFACE AG SERPL QL IA: NONREACTIVE
HCV AB SERPL QL IA: NORMAL
LIPASE SERPL-CCNC: 203 U/L (ref 73–393)

## 2017-08-02 LAB
TTG IGA SER-ACNC: 2 U/ML
TTG IGG SER-ACNC: 1 U/ML

## 2017-08-05 PROBLEM — E66.9 OBESITY, UNSPECIFIED OBESITY SEVERITY, UNSPECIFIED OBESITY TYPE: Status: ACTIVE | Noted: 2017-08-05

## 2017-08-05 LAB — HCV GENTYP SERPL NAA+PROBE: NORMAL

## 2017-08-12 ENCOUNTER — RADIANT APPOINTMENT (OUTPATIENT)
Dept: ULTRASOUND IMAGING | Facility: CLINIC | Age: 32
End: 2017-08-12
Attending: PHYSICIAN ASSISTANT
Payer: COMMERCIAL

## 2017-08-12 PROCEDURE — 76705 ECHO EXAM OF ABDOMEN: CPT

## 2017-08-14 ENCOUNTER — TELEPHONE (OUTPATIENT)
Dept: FAMILY MEDICINE | Facility: CLINIC | Age: 32
End: 2017-08-14

## 2017-08-14 NOTE — TELEPHONE ENCOUNTER
Mom and patient requesting US results, please contact mom she is easier to reach with results. Consent in chart  Lubna Menard RN

## 2017-08-25 ENCOUNTER — OFFICE VISIT (OUTPATIENT)
Dept: FAMILY MEDICINE | Facility: CLINIC | Age: 32
End: 2017-08-25
Payer: COMMERCIAL

## 2017-08-25 VITALS
WEIGHT: 293 LBS | OXYGEN SATURATION: 94 % | DIASTOLIC BLOOD PRESSURE: 96 MMHG | RESPIRATION RATE: 18 BRPM | SYSTOLIC BLOOD PRESSURE: 147 MMHG | BODY MASS INDEX: 47.79 KG/M2 | HEART RATE: 113 BPM

## 2017-08-25 DIAGNOSIS — R51.9 NONINTRACTABLE EPISODIC HEADACHE, UNSPECIFIED HEADACHE TYPE: ICD-10-CM

## 2017-08-25 DIAGNOSIS — E03.9 HYPOTHYROIDISM, UNSPECIFIED TYPE: Primary | ICD-10-CM

## 2017-08-25 DIAGNOSIS — I10 BENIGN ESSENTIAL HYPERTENSION: ICD-10-CM

## 2017-08-25 DIAGNOSIS — E66.9 OBESITY, UNSPECIFIED OBESITY SEVERITY, UNSPECIFIED OBESITY TYPE: ICD-10-CM

## 2017-08-25 LAB
ALBUMIN SERPL-MCNC: 3.9 G/DL (ref 3.4–5)
ALP SERPL-CCNC: 51 U/L (ref 40–150)
ALT SERPL W P-5'-P-CCNC: 75 U/L (ref 0–50)
AST SERPL W P-5'-P-CCNC: 89 U/L (ref 0–45)
BILIRUB DIRECT SERPL-MCNC: 0.1 MG/DL (ref 0–0.2)
BILIRUB SERPL-MCNC: 0.4 MG/DL (ref 0.2–1.3)
PROT SERPL-MCNC: 8.6 G/DL (ref 6.8–8.8)
T4 FREE SERPL-MCNC: 1.15 NG/DL (ref 0.76–1.46)
TSH SERPL DL<=0.005 MIU/L-ACNC: 5.82 MU/L (ref 0.4–4)

## 2017-08-25 PROCEDURE — 99214 OFFICE O/P EST MOD 30 MIN: CPT | Performed by: PHYSICIAN ASSISTANT

## 2017-08-25 PROCEDURE — 84443 ASSAY THYROID STIM HORMONE: CPT | Performed by: PHYSICIAN ASSISTANT

## 2017-08-25 PROCEDURE — 36415 COLL VENOUS BLD VENIPUNCTURE: CPT | Performed by: PHYSICIAN ASSISTANT

## 2017-08-25 PROCEDURE — 84439 ASSAY OF FREE THYROXINE: CPT | Performed by: PHYSICIAN ASSISTANT

## 2017-08-25 PROCEDURE — 80076 HEPATIC FUNCTION PANEL: CPT | Performed by: PHYSICIAN ASSISTANT

## 2017-08-25 RX ORDER — TOPIRAMATE 25 MG/1
TABLET, FILM COATED ORAL
Qty: 60 TABLET | Refills: 1 | Status: SHIPPED | OUTPATIENT
Start: 2017-08-25 | End: 2017-11-21

## 2017-08-25 RX ORDER — PHENTERMINE HYDROCHLORIDE 15 MG/1
15 CAPSULE ORAL EVERY MORNING
Qty: 30 CAPSULE | Refills: 0 | Status: SHIPPED | OUTPATIENT
Start: 2017-08-25 | End: 2017-09-19

## 2017-08-25 RX ORDER — LISINOPRIL AND HYDROCHLOROTHIAZIDE 12.5; 2 MG/1; MG/1
1 TABLET ORAL DAILY
Qty: 30 TABLET | Refills: 1 | Status: SHIPPED | OUTPATIENT
Start: 2017-08-25 | End: 2017-10-19

## 2017-08-25 NOTE — PROGRESS NOTES
SUBJECTIVE:   Yvonne Jordan is a 32 year old female who presents to clinic today for the following health issues:      Hypertension Follow-up      Outpatient blood pressures are being checked at home.  Results are elevated.     Low Salt Diet: low salt            Headaches: off and on x nearly 2 years. No focal neuro changes. Frontal and temporal pressure. Pearson's have evolved with recent blood pressure med adjustment.         Problem list and histories reviewed & adjusted, as indicated.  Additional history: as documented        Reviewed and updated as needed this visit by clinical staffAllergies  Meds       Reviewed and updated as needed this visit by Provider         All other systems negative except as outline above  OBJECTIVE:    Eye exam - right eye normal lid, conjunctiva, cornea, pupil and fundus, left eye normal lid, conjunctiva, cornea, pupil and fundus.  ENT exam reveals - ENT exam normal, no neck nodes or sinus tenderness.  Thyroid not palpable, not enlarged, no nodules detected.  CHEST:chest clear to IPPA, no tachypnea, retractions or cyanosis and S1, S2 normal, no murmur, no gallop, rate regular.  Her disks are flat. Pupils equal, round, reactive to light. Extraocular movements full. Visual fields full. Face moves symmetrically. Tongue midline. Hearing mildly decreased to finger-rubbing at approximately 6-8 inches. Neck without bruits. CV: S1, S2. Motor strength 5/5. Reflexes were 2/4. Toe signs were downgoing. Normal position sense. Good finger-nose-finger and fine finger movement. Gait: she marlena from a chair without difficulty and has a mildly broad-based gait.    Yvonne was seen today for hypertension.    Diagnoses and all orders for this visit:    Hypothyroidism, unspecified type  -     TSH with free T4 reflex    Benign essential hypertension  -     Hepatic panel  -     lisinopril-hydrochlorothiazide (PRINZIDE/ZESTORETIC) 20-12.5 MG per tablet; Take 1 tablet by mouth daily    Nonintractable episodic  headache, unspecified headache type  -     topiramate (TOPAMAX) 25 MG tablet; Take one tab qhs for 2 wks, then increase to 2 tabs qhs thereafter    Obesity, unspecified obesity severity, unspecified obesity type  -     topiramate (TOPAMAX) 25 MG tablet; Take one tab qhs for 2 wks, then increase to 2 tabs qhs thereafter  -     phentermine 15 MG capsule; Take 1 capsule (15 mg) by mouth every morning      work on lifestyle modification  Recheck in 2-4 wks.

## 2017-08-25 NOTE — MR AVS SNAPSHOT
After Visit Summary   8/25/2017    Yvonne Jordan    MRN: [id number]           Patient Information     Date Of Birth          1985        Visit Information        Provider Department      8/25/2017 3:20 PM Antonio Lance PA-C [facility]        Today's Diagnoses     Hypothyroidism, unspecified type    -  1    Benign essential hypertension        Nonintractable episodic headache, unspecified headache type        Obesity, unspecified obesity severity, unspecified obesity type           Follow-ups after your visit        Who to contact     Normal or non-critical lab and imaging results will be communicated to you by [employer], letter or phone within 4 business days after the clinic has received the results. If you do not hear from us within 7 days, please contact the clinic through [employer] or phone. If you have a critical or abnormal lab result, we will notify you by phone as soon as possible.  Submit refill requests through [employer] or call your pharmacy and they will forward the refill request to us. Please allow 3 business days for your refill to be completed.          If you need to speak with a  for additional information , please call: [phone]             Additional Information About Your Visit        MyChart Information     [employer] gives you secure access to your electronic health record. If you see a primary care provider, you can also send messages to your care team and make appointments. If you have questions, please call your primary care clinic.  If you do not have a primary care provider, please call [phone] and they will assist you.        Care EveryWhere ID     This is your Care EveryWhere ID. This could be used by other organizations to access your [place] medical records  [id number]        Your Vitals Were     Pulse Respirations Last Period Pulse Oximetry BMI (Body Mass Index)       113 18 07/24/2017 94% 47.79 kg/m2        Blood Pressure from  Last 3 Encounters:   08/25/17 (!) 147/96   07/31/17 133/81   07/28/17 133/88    Weight from Last 3 Encounters:   08/25/17 (!) 312 lb (141.5 kg)   07/28/17 (!) 312 lb (141.5 kg)   02/02/17 299 lb (135.6 kg)              We Performed the Following     Hepatic panel     TSH with free T4 reflex          Today's Medication Changes          These changes are accurate as of: 8/25/17  4:02 PM.  If you have any questions, ask your nurse or doctor.               Start taking these medicines.        Dose/Directions    lisinopril-hydrochlorothiazide 20-12.5 MG per tablet   Commonly known as:  PRINZIDE/ZESTORETIC   Used for:  Benign essential hypertension   Started by:  Antonio Lance PA-C        Dose:  1 tablet   Take 1 tablet by mouth daily   Quantity:  30 tablet   Refills:  1       phentermine 15 MG capsule   Used for:  Obesity, unspecified obesity severity, unspecified obesity type   Started by:  Antonio Lance PA-C        Dose:  15 mg   Take 1 capsule (15 mg) by mouth every morning   Quantity:  30 capsule   Refills:  0       topiramate 25 MG tablet   Commonly known as:  TOPAMAX   Used for:  Nonintractable episodic headache, unspecified headache type, Obesity, unspecified obesity severity, unspecified obesity type   Started by:  Antonio Lance PA-C        Take one tab qhs for 2 wks, then increase to 2 tabs qhs thereafter   Quantity:  60 tablet   Refills:  1         Stop taking these medicines if you haven't already. Please contact your care team if you have questions.     felodipine ER 5 MG 24 hr tablet   Commonly known as:  PLENDIL   Stopped by:  Antonio Lance PA-C                Where to get your medicines      These medications were sent to [facility] - [address] [address] [facility]  [address] [address] [facility] [place] [address]     Phone:  [phone]     lisinopril-hydrochlorothiazide 20-12.5 MG per tablet    topiramate 25 MG tablet         Some of these will need a paper prescription  and others can be bought over the counter.  Ask your nurse if you have questions.     Bring a paper prescription for each of these medications     phentermine 15 MG capsule                Primary Care Provider Office Phone # Fax #    Antonio Lance PA-C [phone]       [address]  [place] [address]        Equal Access to Services     [facility]: Hadii aad ku hadasho Soomaali, waaxda luqadaha, qaybta kaalmada adeegyada, waxay idiin hayaan adeeg kharash la'aan . So [facility] [phone].    ATENCIÓN: Si habla español, tiene a velasquez disposición servicios gratuitos de asistencia lingüística. Josue al [phone].    We comply with applicable federal civil rights laws and [place] laws. We do not discriminate on the basis of race, color, national origin, age, disability sex, sexual orientation or gender identity.            Thank you!     Thank you for choosing [facility]  for your care. Our goal is always to provide you with excellent care. Hearing back from our patients is one way we can continue to improve our services. Please take a few minutes to complete the written survey that you may receive in the mail after your visit with us. Thank you!             Your Updated Medication List - Protect others around you: Learn how to safely use, store and throw away your medicines at www.disposemymeds.org.          This list is accurate as of: 8/25/17  4:02 PM.  Always use your most recent med list.                   Brand Name Dispense Instructions for use Diagnosis    levothyroxine 75 MCG tablet    SYNTHROID/LEVOTHROID    60 tablet    Take 1 tablet (75 mcg) by mouth daily    Acquired hypothyroidism       lisinopril-hydrochlorothiazide 20-12.5 MG per tablet    PRINZIDE/ZESTORETIC    30 tablet    Take 1 tablet by mouth daily    Benign essential hypertension       metFORMIN 500 MG 24 hr tablet    GLUCOPHAGE-XR    90 tablet    Take 1 tablet (500 mg) by mouth daily (with dinner)     Prediabetes, PCOS (polycystic ovarian syndrome)       Multi-vitamin Tabs tablet      Take 1 tablet by mouth daily        phentermine 15 MG capsule     30 capsule    Take 1 capsule (15 mg) by mouth every morning    Obesity, unspecified obesity severity, unspecified obesity type       topiramate 25 MG tablet    TOPAMAX    60 tablet    Take one tab qhs for 2 wks, then increase to 2 tabs qhs thereafter    Nonintractable episodic headache, unspecified headache type, Obesity, unspecified obesity severity, unspecified obesity type

## 2017-09-05 ENCOUNTER — TELEPHONE (OUTPATIENT)
Dept: FAMILY MEDICINE | Facility: CLINIC | Age: 32
End: 2017-09-05

## 2017-09-05 DIAGNOSIS — E03.9 ACQUIRED HYPOTHYROIDISM: ICD-10-CM

## 2017-09-05 DIAGNOSIS — E03.9 HYPOTHYROIDISM: Primary | ICD-10-CM

## 2017-09-05 RX ORDER — LEVOTHYROXINE SODIUM 88 UG/1
88 TABLET ORAL DAILY
Qty: 60 TABLET | Refills: 0 | Status: SHIPPED | OUTPATIENT
Start: 2017-09-05 | End: 2017-11-08

## 2017-09-05 NOTE — TELEPHONE ENCOUNTER
Patient requesting new dose of thyroid medication you had discussed at recent visit. Please route new dose if you feel this is appropriate. Thank you

## 2017-09-11 ENCOUNTER — TELEPHONE (OUTPATIENT)
Dept: FAMILY MEDICINE | Facility: CLINIC | Age: 32
End: 2017-09-11

## 2017-09-11 NOTE — TELEPHONE ENCOUNTER
Panel Management Review      Patient has the following on her problem list:     Hypertension   Last three blood pressure readings:  BP Readings from Last 3 Encounters:   08/25/17 (!) 147/96   07/31/17 133/81   07/28/17 133/88     Blood pressure: FAILED    HTN Guidelines:  Age 18-59 BP range:  Less than 140/90  Age 60-85 with Diabetes:  Less than 140/90  Age 60-85 without Diabetes:  less than 150/90        Composite cancer screening  Chart review shows that this patient is due/due soon for the following None  Summary:    Patient is due/failing the following:   BP CHECK    Type of outreach:    Sent Zwamy message.    Questions for provider review:    None                                                                                                                                    Michelle Horton MA       Chart routed to Care Team .

## 2017-09-18 ENCOUNTER — TELEPHONE (OUTPATIENT)
Dept: FAMILY MEDICINE | Facility: CLINIC | Age: 32
End: 2017-09-18

## 2017-09-18 DIAGNOSIS — E66.9 OBESITY, UNSPECIFIED OBESITY SEVERITY, UNSPECIFIED OBESITY TYPE: ICD-10-CM

## 2017-09-18 NOTE — TELEPHONE ENCOUNTER
Patient would like to request an increase of phentermine 15 MG capsule. It doesn't seem effective anymore.

## 2017-09-19 RX ORDER — PHENTERMINE HYDROCHLORIDE 30 MG/1
30 CAPSULE ORAL EVERY MORNING
Qty: 90 CAPSULE | Refills: 0 | Status: SHIPPED | OUTPATIENT
Start: 2017-09-19 | End: 2017-11-21

## 2017-10-04 ENCOUNTER — MYC MEDICAL ADVICE (OUTPATIENT)
Dept: FAMILY MEDICINE | Facility: CLINIC | Age: 32
End: 2017-10-04

## 2017-10-04 NOTE — TELEPHONE ENCOUNTER
Patient is asking if an appointment is necessary at this time, or can she just have her labs drawn? Last seen on 08/25/17. She states she is feeling good, no BP concerns and headaches are not an issue. Time and $$ are issues for office visit .

## 2017-10-05 DIAGNOSIS — E06.3 HYPOTHYROIDISM DUE TO HASHIMOTO'S THYROIDITIS: Primary | ICD-10-CM

## 2017-10-05 NOTE — TELEPHONE ENCOUNTER
i'd really like to see her for a recheck of her blood pressure to confirm it's controlled. This would be a less expensive visit as compared to the first couple.

## 2017-10-19 DIAGNOSIS — I10 BENIGN ESSENTIAL HYPERTENSION: ICD-10-CM

## 2017-10-20 RX ORDER — LISINOPRIL AND HYDROCHLOROTHIAZIDE 12.5; 2 MG/1; MG/1
TABLET ORAL
Qty: 30 TABLET | Refills: 0 | Status: SHIPPED | OUTPATIENT
Start: 2017-10-20 | End: 2017-11-13

## 2017-10-20 NOTE — TELEPHONE ENCOUNTER
Medication is being filled for 1 time refill only due to:  Patient needs to be seen because was to follow up..   Reminder sent.   Potassium   Date Value Ref Range Status   07/28/2017 4.1 3.4 - 5.3 mmol/L Final   ]  Normal results although new refill  Workflow reports this as abnormal.  Lubna Menard RN

## 2017-11-03 ENCOUNTER — ALLIED HEALTH/NURSE VISIT (OUTPATIENT)
Dept: NURSING | Facility: CLINIC | Age: 32
End: 2017-11-03
Payer: COMMERCIAL

## 2017-11-03 VITALS — DIASTOLIC BLOOD PRESSURE: 86 MMHG | SYSTOLIC BLOOD PRESSURE: 126 MMHG

## 2017-11-03 DIAGNOSIS — E06.3 HYPOTHYROIDISM DUE TO HASHIMOTO'S THYROIDITIS: ICD-10-CM

## 2017-11-03 DIAGNOSIS — E03.9 ACQUIRED HYPOTHYROIDISM: ICD-10-CM

## 2017-11-03 DIAGNOSIS — Z01.30 BP CHECK: Primary | ICD-10-CM

## 2017-11-03 LAB
ALBUMIN SERPL-MCNC: 4.2 G/DL (ref 3.4–5)
ALP SERPL-CCNC: 46 U/L (ref 40–150)
ALT SERPL W P-5'-P-CCNC: 73 U/L (ref 0–50)
AST SERPL W P-5'-P-CCNC: 70 U/L (ref 0–45)
BILIRUB DIRECT SERPL-MCNC: 0.1 MG/DL (ref 0–0.2)
BILIRUB SERPL-MCNC: 0.6 MG/DL (ref 0.2–1.3)
PROT SERPL-MCNC: 9 G/DL (ref 6.8–8.8)
TSH SERPL DL<=0.005 MIU/L-ACNC: 3.51 MU/L (ref 0.4–4)

## 2017-11-03 PROCEDURE — 84443 ASSAY THYROID STIM HORMONE: CPT | Performed by: PHYSICIAN ASSISTANT

## 2017-11-03 PROCEDURE — 99207 ZZC NO CHARGE NURSE ONLY: CPT

## 2017-11-03 PROCEDURE — 36415 COLL VENOUS BLD VENIPUNCTURE: CPT | Performed by: PHYSICIAN ASSISTANT

## 2017-11-03 PROCEDURE — 80076 HEPATIC FUNCTION PANEL: CPT | Performed by: PHYSICIAN ASSISTANT

## 2017-11-08 ENCOUNTER — TELEPHONE (OUTPATIENT)
Dept: FAMILY MEDICINE | Facility: CLINIC | Age: 32
End: 2017-11-08

## 2017-11-08 RX ORDER — LEVOTHYROXINE SODIUM 100 UG/1
100 TABLET ORAL DAILY
Qty: 60 TABLET | Refills: 0 | Status: SHIPPED | OUTPATIENT
Start: 2017-11-08 | End: 2017-12-27

## 2017-11-08 NOTE — TELEPHONE ENCOUNTER
Patient had labs done on 11-3-17.  Mother requesting provider to review and advise.  Especially TSH she was under the impression provider wanted TSH to be below 3.  Patient needs to renew thyroid med and needs to know instructions.  Will send to provider to advise.

## 2017-11-13 DIAGNOSIS — I10 BENIGN ESSENTIAL HYPERTENSION: ICD-10-CM

## 2017-11-13 RX ORDER — LISINOPRIL AND HYDROCHLOROTHIAZIDE 12.5; 2 MG/1; MG/1
TABLET ORAL
Qty: 90 TABLET | Refills: 1 | Status: SHIPPED | OUTPATIENT
Start: 2017-11-13 | End: 2017-12-28

## 2017-11-21 DIAGNOSIS — E66.9 OBESITY, UNSPECIFIED OBESITY SEVERITY, UNSPECIFIED OBESITY TYPE: ICD-10-CM

## 2017-11-21 DIAGNOSIS — R51.9 NONINTRACTABLE EPISODIC HEADACHE, UNSPECIFIED HEADACHE TYPE: ICD-10-CM

## 2017-11-22 RX ORDER — PHENTERMINE HYDROCHLORIDE 37.5 MG/1
37.5 CAPSULE ORAL EVERY MORNING
Qty: 30 CAPSULE | Refills: 0 | Status: SHIPPED | OUTPATIENT
Start: 2017-11-22 | End: 2017-12-28

## 2017-11-22 RX ORDER — TOPIRAMATE 25 MG/1
25 TABLET, FILM COATED ORAL 2 TIMES DAILY
Qty: 60 TABLET | Refills: 0 | Status: SHIPPED | OUTPATIENT
Start: 2017-11-22 | End: 2017-12-28

## 2017-12-22 ENCOUNTER — OFFICE VISIT (OUTPATIENT)
Dept: FAMILY MEDICINE | Facility: CLINIC | Age: 32
End: 2017-12-22
Payer: COMMERCIAL

## 2017-12-22 VITALS
SYSTOLIC BLOOD PRESSURE: 122 MMHG | WEIGHT: 286 LBS | DIASTOLIC BLOOD PRESSURE: 73 MMHG | HEIGHT: 68 IN | BODY MASS INDEX: 43.35 KG/M2 | HEART RATE: 78 BPM | TEMPERATURE: 99.6 F

## 2017-12-22 DIAGNOSIS — Z11.51 SCREENING FOR HUMAN PAPILLOMAVIRUS: ICD-10-CM

## 2017-12-22 DIAGNOSIS — Z12.4 SCREENING FOR CERVICAL CANCER: Primary | ICD-10-CM

## 2017-12-22 DIAGNOSIS — E06.3 HYPOTHYROIDISM DUE TO HASHIMOTO'S THYROIDITIS: ICD-10-CM

## 2017-12-22 LAB
ALBUMIN SERPL-MCNC: 4.1 G/DL (ref 3.4–5)
ALP SERPL-CCNC: 46 U/L (ref 40–150)
ALT SERPL W P-5'-P-CCNC: 73 U/L (ref 0–50)
AST SERPL W P-5'-P-CCNC: 74 U/L (ref 0–45)
BILIRUB DIRECT SERPL-MCNC: <0.1 MG/DL (ref 0–0.2)
BILIRUB SERPL-MCNC: 0.4 MG/DL (ref 0.2–1.3)
PROT SERPL-MCNC: 9.2 G/DL (ref 6.8–8.8)
TSH SERPL DL<=0.005 MIU/L-ACNC: 3.56 MU/L (ref 0.4–4)

## 2017-12-22 PROCEDURE — 36415 COLL VENOUS BLD VENIPUNCTURE: CPT | Performed by: PHYSICIAN ASSISTANT

## 2017-12-22 PROCEDURE — 84443 ASSAY THYROID STIM HORMONE: CPT | Performed by: PHYSICIAN ASSISTANT

## 2017-12-22 PROCEDURE — 99213 OFFICE O/P EST LOW 20 MIN: CPT | Performed by: NURSE PRACTITIONER

## 2017-12-22 PROCEDURE — 80076 HEPATIC FUNCTION PANEL: CPT | Performed by: PHYSICIAN ASSISTANT

## 2017-12-22 PROCEDURE — G0145 SCR C/V CYTO,THINLAYER,RESCR: HCPCS | Performed by: NURSE PRACTITIONER

## 2017-12-22 PROCEDURE — 87624 HPV HI-RISK TYP POOLED RSLT: CPT | Performed by: NURSE PRACTITIONER

## 2017-12-22 NOTE — MR AVS SNAPSHOT
After Visit Summary   12/22/2017    Yvonne Jordan    MRN: [id number]           Patient Information     Date Of Birth          1985        Visit Information        Provider Department      12/22/2017 1:20 PM Sophia Gallegos NP [facility]        Today's Diagnoses     Screening for cervical cancer    -  1    Hypothyroidism due to Hashimoto's thyroiditis        Elevation of level of transaminase or lactic acid dehydrogenase (LDH)        Screening for human papillomavirus          Care Instructions    Follow up if you have any health care questions or concerns that you would like to address.    Preventive Health Recommendations  Female Ages 26 - 39  Yearly exam:   See your health care provider every year in order to    Review health changes.     Discuss preventive care.      Review your medicines if you your doctor has prescribed any.    Until age 30: Get a Pap test every three years (more often if you have had an abnormal result).    After age 30: Talk to your doctor about whether you should have a Pap test every 3 years or have a Pap test with HPV screening every 5 years.   You do not need a Pap test if your uterus was removed (hysterectomy) and you have not had cancer.  You should be tested each year for STDs (sexually transmitted diseases), if you're at risk.   Talk to your provider about how often to have your cholesterol checked.  If you are at risk for diabetes, you should have a diabetes test (fasting glucose).  Shots: Get a flu shot each year. Get a tetanus shot every 10 years.   Nutrition:     Eat at least 5 servings of fruits and vegetables each day.    Eat whole-grain bread, whole-wheat pasta and brown rice instead of white grains and rice.    Talk to your provider about Calcium and Vitamin D.     Lifestyle    Exercise at least 150 minutes a week (30 minutes a day, 5 days of the week). This will help you control your weight and prevent disease.    Limit alcohol to one drink  "per day.    No smoking.     Wear sunscreen to prevent skin cancer.    See your dentist every six months for an exam and cleaning.            Follow-ups after your visit        Follow-up notes from your care team     Return if symptoms worsen or fail to improve.      Your next 10 appointments already scheduled     Dec 28, 2017  9:00 AM CST   [facility]-C   [facility]    [address] [address] [place] [id number] [phone]              Who to contact     Normal or non-critical lab and imaging results will be communicated to you by Tinteohart, letter or phone within 4 business days after the clinic has received the results. If you do not hear from us within 7 days, please contact the clinic through Tinteohart or phone. If you have a critical or abnormal lab result, we will notify you by phone as soon as possible.  Submit refill requests through vLine or call your pharmacy and they will forward the refill request to us. Please allow 3 business days for your refill to be completed.          If you need to speak with a  for additional information , please call: [phone]             Additional Information About Your Visit        vLine Information     vLine gives you secure access to your electronic health record. If you see a primary care provider, you can also send messages to your care team and make appointments. If you have questions, please call your primary care clinic.  If you do not have a primary care provider, please call [phone] and they will assist you.        Care EveryWhere ID     This is your Care EveryWhere ID. This could be used by other organizations to access your [place] medical records  [id number]        Your Vitals Were     Pulse Temperature Height BMI (Body Mass Index)          78 99.6  F (37.6  C) (Tympanic) 5' 7.5\" (1.715 m) 44.13 kg/m2         Blood Pressure from Last 3 Encounters:   12/22/17 122/73 "   11/03/17 126/86   08/25/17 (!) 147/96    Weight from Last 3 Encounters:   12/22/17 286 lb (129.7 kg)   08/25/17 (!) 312 lb (141.5 kg)   07/28/17 (!) 312 lb (141.5 kg)              We Performed the Following     **Hepatic panel FUTURE 2mo     HPV High Risk Types DNA Cervical     Pap imaged thin layer screen with HPV - recommended age 30 - 65 years (select HPV order below)     TSH        Primary Care Provider Office Phone # Fax #    Antonio Lance PA-C [phone] [address] [facility] [address]        Equal Access to Services     SARAVANAN HYDE : Hadii andre Hamilton, walolita arevalo, qaraphaelta kaalmada roberto, diana benítez . So [facility] [phone].    ATENCIÓN: Si habla español, tiene a velasquez disposición servicios gratuitos de asistencia lingüística. Llame al [phone].    We comply with applicable federal civil rights laws and [place] laws. We do not discriminate on the basis of race, color, national origin, age, disability, sex, sexual orientation, or gender identity.            Thank you!     Thank you for choosing [facility]  for your care. Our goal is always to provide you with excellent care. Hearing back from our patients is one way we can continue to improve our services. Please take a few minutes to complete the written survey that you may receive in the mail after your visit with us. Thank you!             Your Updated Medication List - Protect others around you: Learn how to safely use, store and throw away your medicines at www.disposemymeds.org.          This list is accurate as of: 12/22/17  1:34 PM.  Always use your most recent med list.                   Brand Name Dispense Instructions for use Diagnosis    levothyroxine 100 MCG tablet    SYNTHROID/LEVOTHROID    60 tablet    Take 1 tablet (100 mcg) by mouth daily    Hypothyroidism due to Hashimoto's thyroiditis       lisinopril-hydrochlorothiazide 20-12.5 MG per tablet     PRINZIDE/ZESTORETIC    90 tablet    TAKE ONE TABLET BY MOUTH EVERY DAY (NEED TO BE SEEN IN CLINIC FOR FURTHER REFILLS)    Benign essential hypertension       metFORMIN 500 MG 24 hr tablet    GLUCOPHAGE-XR    90 tablet    Take 1 tablet (500 mg) by mouth daily (with dinner)    Prediabetes, PCOS (polycystic ovarian syndrome)       Multi-vitamin Tabs tablet      Take 1 tablet by mouth daily        phentermine 37.5 MG capsule     30 capsule    Take 1 capsule (37.5 mg) by mouth every morning    Obesity, unspecified obesity severity, unspecified obesity type       topiramate 25 MG tablet    TOPAMAX    60 tablet    Take 1 tablet (25 mg) by mouth 2 times daily    Nonintractable episodic headache, unspecified headache type, Obesity, unspecified obesity severity, unspecified obesity type

## 2017-12-22 NOTE — PATIENT INSTRUCTIONS
Follow up if you have any health care questions or concerns that you would like to address.    Preventive Health Recommendations  Female Ages 26 - 39  Yearly exam:   See your health care provider every year in order to    Review health changes.     Discuss preventive care.      Review your medicines if you your doctor has prescribed any.    Until age 30: Get a Pap test every three years (more often if you have had an abnormal result).    After age 30: Talk to your doctor about whether you should have a Pap test every 3 years or have a Pap test with HPV screening every 5 years.   You do not need a Pap test if your uterus was removed (hysterectomy) and you have not had cancer.  You should be tested each year for STDs (sexually transmitted diseases), if you're at risk.   Talk to your provider about how often to have your cholesterol checked.  If you are at risk for diabetes, you should have a diabetes test (fasting glucose).  Shots: Get a flu shot each year. Get a tetanus shot every 10 years.   Nutrition:     Eat at least 5 servings of fruits and vegetables each day.    Eat whole-grain bread, whole-wheat pasta and brown rice instead of white grains and rice.    Talk to your provider about Calcium and Vitamin D.     Lifestyle    Exercise at least 150 minutes a week (30 minutes a day, 5 days of the week). This will help you control your weight and prevent disease.    Limit alcohol to one drink per day.    No smoking.     Wear sunscreen to prevent skin cancer.    See your dentist every six months for an exam and cleaning.

## 2017-12-22 NOTE — PROGRESS NOTES
SUBJECTIVE:   CC: Yvonne Jordan is an 32 year old woman who presents for repeat PAP only. No physical at this time.     Healthy Habits:    Do you get at least three servings of calcium containing foods daily (dairy, green leafy vegetables, etc.)? yes    Amount of exercise or daily activities, outside of work: 2-3 day(s) per week    Problems taking medications regularly No    Medication side effects: No    Have you had an eye exam in the past two years? yes    Do you see a dentist twice per year? yes    Do you have sleep apnea, excessive snoring or daytime drowsiness?no    Concerns: Here for repeat PAP only. No physical at this time.       Today's PHQ-2 Score:   PHQ-2 ( 1999 Pf[employer]) 12/22/2017 11/25/2016   Q1: Little interest or pleasure in doing things - 0   Q2: Feeling down, depressed or hopeless 0 0   PHQ-2 Score - 0         Abuse: Current or Past(Physical, Sexual or Emotional)- No  Do you feel safe in your environment - Yes  Social History   Substance Use Topics     Smoking status: Never Smoker     Smokeless tobacco: Never Used     Alcohol use Yes      Comment: very rare     If you drink alcohol do you typically have >3 drinks per day or >7 drinks per week? No                     Reviewed orders with patient.  Reviewed health maintenance and updated orders accordingly - Yes  Labs reviewed in EPIC  BP Readings from Last 3 Encounters:   12/22/17 122/73   11/03/17 126/86   08/25/17 (!) 147/96    Wt Readings from Last 3 Encounters:   12/22/17 286 lb (129.7 kg)   08/25/17 (!) 312 lb (141.5 kg)   07/28/17 (!) 312 lb (141.5 kg)                  Patient Active Problem List   Diagnosis     Hyperlipidemia LDL goal <160     Irregular menses     S/P breast augmentation     NATHAN 3 - cervical intraepithelial neoplasia grade 3     Health Care Home     Obesity, unspecified obesity severity, unspecified obesity type     Hypothyroidism due to Hashimoto's thyroiditis     Past Surgical History:   Procedure Laterality Date      COLPOSCOPY, BIOPSY, COMBINED  2009     CONIZATION  2005     COSMETIC MAMMOPLASTY AUGMENTATION       MOUTH SURGERY      wisdom teeth extraction       Social History   Substance Use Topics     Smoking status: Never Smoker     Smokeless tobacco: Never Used     Alcohol use Yes      Comment: very rare     Family History   Problem Relation Age of Onset     Arthritis Mother      Thyroid Disease Mother      Hypertension Father      CANCER Maternal Grandmother      Hypertension Maternal Grandmother      CANCER Paternal Grandmother      Non-Hodgkins Lymphoma     CANCER Paternal Grandfather          Current Outpatient Prescriptions   Medication Sig Dispense Refill     topiramate (TOPAMAX) 25 MG tablet Take 1 tablet (25 mg) by mouth 2 times daily 60 tablet 0     lisinopril-hydrochlorothiazide (PRINZIDE/ZESTORETIC) 20-12.5 MG per tablet TAKE ONE TABLET BY MOUTH EVERY DAY (NEED TO BE SEEN IN CLINIC FOR FURTHER REFILLS) 90 tablet 1     levothyroxine (SYNTHROID/LEVOTHROID) 100 MCG tablet Take 1 tablet (100 mcg) by mouth daily 60 tablet 0     metFORMIN (GLUCOPHAGE-XR) 500 MG 24 hr tablet Take 1 tablet (500 mg) by mouth daily (with dinner) 90 tablet 1     multivitamin, therapeutic with minerals (MULTI-VITAMIN) TABS Take 1 tablet by mouth daily       phentermine 37.5 MG capsule Take 1 capsule (37.5 mg) by mouth every morning 30 capsule 0     Allergies   Allergen Reactions     Nkda [No Known Drug Allergies]            Mammogram not appropriate for this patient based on age.    Pertinent mammograms are reviewed under the imaging tab.  History of abnormal Pap smear: YES - updated in Problem List and Health Maintenance accordingly    Reviewed and updated as needed this visit by clinical staffTobacco  Meds         Reviewed and updated as needed this visit by Provider        Past Medical History:   Diagnosis Date     Abnormal Pap smear      Cervical high risk HPV (human papillomavirus) test positive 8/2015, 11/25/16    Neg 16/18     Hx of  "colposcopy with cervical biopsy 17    Bx & ECC - negative     Menstrual migraine      S/P cone biopsy of cervix 2006    HSIL NATHAN 2-3     Thyroid disease       Past Surgical History:   Procedure Laterality Date     COLPOSCOPY, BIOPSY, COMBINED  2009     CONIZATION       COSMETIC MAMMOPLASTY AUGMENTATION       MOUTH SURGERY      wisdom teeth extraction     Obstetric History       T0      L0     SAB0   TAB0   Ectopic0   Multiple0   Live Births0           ROS:  C: NEGATIVE for fever, chills, change in weight  I: NEGATIVE for worrisome rashes, moles or lesions  E: NEGATIVE for vision changes or irritation  ENT: NEGATIVE for ear, mouth and throat problems  R: NEGATIVE for significant cough or SOB  B: NEGATIVE for masses, tenderness or discharge  CV: NEGATIVE for chest pain, palpitations or peripheral edema  GI: NEGATIVE for nausea, abdominal pain, heartburn, or change in bowel habits  : NEGATIVE for unusual urinary or vaginal symptoms. Periods are regular.  M: NEGATIVE for significant arthralgias or myalgia  N: NEGATIVE for weakness, dizziness or paresthesias  E: NEGATIVE for temperature intolerance, skin/hair changes  H: NEGATIVE for bleeding problems  P: NEGATIVE for changes in mood or affect    OBJECTIVE:   /73  Pulse 78  Temp 99.6  F (37.6  C) (Tympanic)  Ht 5' 7.5\" (1.715 m)  Wt 286 lb (129.7 kg)  BMI 44.13 kg/m2  EXAM:  GENERAL: healthy, alert and no distress  RESP: lungs clear to auscultation - no rales, rhonchi or wheezes  CV: regular rate and rhythm, normal S1 S2, no S3 or S4, no murmur, click or rub, no peripheral edema and peripheral pulses strong   (female): normal female external genitalia, normal urethral meatus, vaginal mucosa pink, moist, well rugated, and normal cervix/adnexa/uterus without masses or discharge  RECTAL: normal sphincter tone  PSYCH: mentation appears normal, affect normal/bright    ASSESSMENT/PLAN:       ICD-10-CM    1. Screening for cervical cancer " "Z12.4 Pap imaged thin layer screen with HPV - recommended age 30 - 65 years (select HPV order below)   2. Hypothyroidism due to Hashimoto's thyroiditis E03.8 TSH    E06.3    3. Elevation of level of transaminase or lactic acid dehydrogenase (LDH) R74.0 **Hepatic panel FUTURE 2mo   4. Screening for human papillomavirus Z11.51 HPV High Risk Types DNA Cervical       COUNSELING:   Reviewed preventive health counseling, as reflected in patient instructions         reports that she has never smoked. She has never used smokeless tobacco.    Estimated body mass index is 44.13 kg/(m^2) as calculated from the following:    Height as of this encounter: 5' 7.5\" (1.715 m).    Weight as of this encounter: 286 lb (129.7 kg).   Weight management plan: Discussed healthy diet and exercise guidelines and patient will follow up in 12 months in clinic to re-evaluate.    Counseling Resources:  ATP IV Guidelines  Pooled Cohorts Equation Calculator  Breast Cancer Risk Calculator  FRAX Risk Assessment  ICSI Preventive Guidelines  Dietary Guidelines for Americans, 2010  USDA's MyPlate  ASA Prophylaxis  Lung CA Screening    TRENTON Somers  [facility] JAN  "

## 2017-12-22 NOTE — NURSING NOTE
"Chief Complaint   Patient presents with     Physical       Initial /73  Pulse 78  Temp 99.6  F (37.6  C) (Tympanic)  Ht 5' 7.5\" (1.715 m)  Wt 286 lb (129.7 kg)  BMI 44.13 kg/m2 Estimated body mass index is 44.13 kg/(m^2) as calculated from the following:    Height as of this encounter: 5' 7.5\" (1.715 m).    Weight as of this encounter: 286 lb (129.7 kg).  Medication Reconciliation: lissett Garcia CMA      "

## 2017-12-27 LAB
COPATH REPORT: NORMAL
PAP: NORMAL

## 2017-12-27 RX ORDER — LEVOTHYROXINE SODIUM 112 UG/1
112 TABLET ORAL DAILY
Qty: 60 TABLET | Refills: 0 | Status: SHIPPED | OUTPATIENT
Start: 2017-12-27 | End: 2017-12-28

## 2017-12-28 ENCOUNTER — OFFICE VISIT (OUTPATIENT)
Dept: FAMILY MEDICINE | Facility: CLINIC | Age: 32
End: 2017-12-28
Payer: COMMERCIAL

## 2017-12-28 VITALS
WEIGHT: 286 LBS | RESPIRATION RATE: 18 BRPM | OXYGEN SATURATION: 96 % | HEART RATE: 123 BPM | BODY MASS INDEX: 44.13 KG/M2 | DIASTOLIC BLOOD PRESSURE: 84 MMHG | SYSTOLIC BLOOD PRESSURE: 128 MMHG

## 2017-12-28 DIAGNOSIS — E06.3 HYPOTHYROIDISM DUE TO HASHIMOTO'S THYROIDITIS: ICD-10-CM

## 2017-12-28 DIAGNOSIS — I10 BENIGN ESSENTIAL HYPERTENSION: ICD-10-CM

## 2017-12-28 DIAGNOSIS — E28.2 PCOS (POLYCYSTIC OVARIAN SYNDROME): ICD-10-CM

## 2017-12-28 DIAGNOSIS — R73.03 PREDIABETES: ICD-10-CM

## 2017-12-28 DIAGNOSIS — E66.9 OBESITY, UNSPECIFIED OBESITY SEVERITY, UNSPECIFIED OBESITY TYPE: ICD-10-CM

## 2017-12-28 DIAGNOSIS — R51.9 NONINTRACTABLE EPISODIC HEADACHE, UNSPECIFIED HEADACHE TYPE: ICD-10-CM

## 2017-12-28 PROCEDURE — 99214 OFFICE O/P EST MOD 30 MIN: CPT | Performed by: PHYSICIAN ASSISTANT

## 2017-12-28 RX ORDER — TOPIRAMATE 25 MG/1
25 TABLET, FILM COATED ORAL 2 TIMES DAILY
Qty: 180 TABLET | Refills: 1 | Status: SHIPPED | OUTPATIENT
Start: 2017-12-28 | End: 2018-07-31

## 2017-12-28 RX ORDER — LEVOTHYROXINE SODIUM 112 UG/1
112 TABLET ORAL DAILY
Qty: 180 TABLET | Refills: 1 | Status: SHIPPED | OUTPATIENT
Start: 2017-12-28 | End: 2018-02-22

## 2017-12-28 RX ORDER — METFORMIN HCL 500 MG
500 TABLET, EXTENDED RELEASE 24 HR ORAL
Qty: 90 TABLET | Refills: 1 | Status: SHIPPED | OUTPATIENT
Start: 2017-12-28 | End: 2018-07-15

## 2017-12-28 RX ORDER — LISINOPRIL AND HYDROCHLOROTHIAZIDE 12.5; 2 MG/1; MG/1
TABLET ORAL
Qty: 90 TABLET | Refills: 1 | Status: SHIPPED | OUTPATIENT
Start: 2017-12-28 | End: 2018-10-31

## 2017-12-28 RX ORDER — PHENTERMINE HYDROCHLORIDE 37.5 MG/1
37.5 CAPSULE ORAL EVERY MORNING
Qty: 30 CAPSULE | Refills: 0 | Status: SHIPPED | OUTPATIENT
Start: 2017-12-28 | End: 2018-01-24

## 2017-12-28 NOTE — MR AVS SNAPSHOT
After Visit Summary   12/28/2017    Yvonne Jordan    MRN: [id number]           Patient Information     Date Of Birth          1985        Visit Information        Provider Department      12/28/2017 9:00 AM Antonio Lance PA-C [facility] [place]        Today's Diagnoses     Hypothyroidism due to Hashimoto's thyroiditis        Nonintractable episodic headache, unspecified headache type        Obesity, unspecified obesity severity, unspecified obesity type        Benign essential hypertension        Prediabetes        PCOS (polycystic ovarian syndrome)           Follow-ups after your visit        Who to contact     Normal or non-critical lab and imaging results will be communicated to you by BugHerdhart, letter or phone within 4 business days after the clinic has received the results. If you do not hear from us within 7 days, please contact the clinic through Spindlet or phone. If you have a critical or abnormal lab result, we will notify you by phone as soon as possible.  Submit refill requests through SLM Technologies or call your pharmacy and they will forward the refill request to us. Please allow 3 business days for your refill to be completed.          If you need to speak with a  for additional information , please call: [phone]             Additional Information About Your Visit        BugHerdharAirborne Mobile Information     [employer] gives you secure access to your electronic health record. If you see a primary care provider, you can also send messages to your care team and make appointments. If you have questions, please call your primary care clinic.  If you do not have a primary care provider, please call [phone] and they will assist you.        Care EveryWhere ID     This is your Care EveryWhere ID. This could be used by other organizations to access your [place] medical records  [id number]        Your Vitals Were     Pulse Respirations Pulse Oximetry BMI (Body Mass Index)           123 18 96% 44.13 kg/m2         Blood Pressure from Last 3 Encounters:   12/28/17 128/84   12/22/17 122/73   11/03/17 126/86    Weight from Last 3 Encounters:   12/28/17 286 lb (129.7 kg)   12/22/17 286 lb (129.7 kg)   08/25/17 (!) 312 lb (141.5 kg)              Today, you had the following     No orders found for display         Today's Medication Changes          These changes are accurate as of: 12/28/17 10:07 AM.  If you have any questions, ask your nurse or doctor.               These medicines have changed or have updated prescriptions.        Dose/Directions    lisinopril-hydrochlorothiazide 20-12.5 MG per tablet   Commonly known as:  PRINZIDE/ZESTORETIC   This may have changed:  See the new instructions.   Used for:  Benign essential hypertension   Changed by:  Antonio Lance PA-C        TAKE ONE TABLET BY MOUTH EVERY DAY (NEED TO BE SEEN IN CLINIC FOR FURTHER REFILLS)   Quantity:  90 tablet   Refills:  1            Where to get your medicines      These medications were sent to [facility] - [address] [facility]  [address] [facility] [address] [facility] [place] [address]     Phone:  [phone]     levothyroxine 112 MCG tablet    lisinopril-hydrochlorothiazide 20-12.5 MG per tablet    metFORMIN 500 MG 24 hr tablet    topiramate 25 MG tablet         Some of these will need a paper prescription and others can be bought over the counter.  Ask your nurse if you have questions.     Bring a paper prescription for each of these medications     phentermine 37.5 MG capsule                Primary Care Provider Office Phone # Fax #    Antonio Lance PA-C [phone] [address]  [place] [address]        Equal Access to Services     LINDA HYDE AH: Jeff Hamilton, waaxda luqadaha, qaybta kaalmada adeegyada, diana castaneda. So [facility] [phone].    ATENCIÓN: Si habla español, tiene a velasquez disposición servicios gratuitos de asistencia  lingüística. Josue al [phone].    We comply with applicable federal civil rights laws and [place] laws. We do not discriminate on the basis of race, color, national origin, age, disability, sex, sexual orientation, or gender identity.            Thank you!     Thank you for choosing [facility]  for your care. Our goal is always to provide you with excellent care. Hearing back from our patients is one way we can continue to improve our services. Please take a few minutes to complete the written survey that you may receive in the mail after your visit with us. Thank you!             Your Updated Medication List - Protect others around you: Learn how to safely use, store and throw away your medicines at www.disposemymeds.org.          This list is accurate as of: 12/28/17 10:07 AM.  Always use your most recent med list.                   Brand Name Dispense Instructions for use Diagnosis    levothyroxine 112 MCG tablet    SYNTHROID/LEVOTHROID    180 tablet    Take 1 tablet (112 mcg) by mouth daily    Hypothyroidism due to Hashimoto's thyroiditis       lisinopril-hydrochlorothiazide 20-12.5 MG per tablet    PRINZIDE/ZESTORETIC    90 tablet    TAKE ONE TABLET BY MOUTH EVERY DAY (NEED TO BE SEEN IN CLINIC FOR FURTHER REFILLS)    Benign essential hypertension       metFORMIN 500 MG 24 hr tablet    GLUCOPHAGE-XR    90 tablet    Take 1 tablet (500 mg) by mouth daily (with dinner)    Prediabetes, PCOS (polycystic ovarian syndrome)       Multi-vitamin Tabs tablet      Take 1 tablet by mouth daily        phentermine 37.5 MG capsule     30 capsule    Take 1 capsule (37.5 mg) by mouth every morning    Obesity, unspecified obesity severity, unspecified obesity type       topiramate 25 MG tablet    TOPAMAX    180 tablet    Take 1 tablet (25 mg) by mouth 2 times daily    Nonintractable episodic headache, unspecified headache type, Obesity, unspecified obesity severity, unspecified obesity type

## 2017-12-28 NOTE — PROGRESS NOTES
SUBJECTIVE:   Yvonne Jordan is a 32 year old female who presents to clinic today for the following health issues:      Hypertension Follow-up      Outpatient blood pressures are not being checked.    Low Salt Diet: low salt    Hypothyroidism Follow-up      Since last visit, patient describes the following symptoms: Weight stable, no hair loss, no skin changes, no constipation, no loose stools        Amount of exercise or physical activity: 4-5 days/week for an average of 30-45 minutes    Problems taking medications regularly: No    Medication side effects: none    Diet: low salt and low fat/cholesterol            Problem list and histories reviewed & adjusted, as indicated.  Additional history: as documented        Reviewed and updated as needed this visit by clinical staffTobacco  Allergies  Meds       Reviewed and updated as needed this visit by Provider         All other systems negative except as outline above  OBJECTIVE:    Eye exam - right eye normal lid, conjunctiva, cornea, pupil and fundus, left eye normal lid, conjunctiva, cornea, pupil and fundus.  Thyroid not palpable, not enlarged, no nodules detected.  CHEST:chest clear to IPPA, no tachypnea, retractions or cyanosis and S1, S2 normal, no murmur, no gallop, rate regular.  The abdomen is soft without tenderness, guarding, mass, rebound or organomegaly. Bowel sounds are normal. No CVA tenderness or inguinal adenopathy noted.  Foot exam - both sides normal; no swelling, tenderness or skin or vascular lesions. Color and temperature is normal. Sensation is intact. Peripheral pulses are palpable. Toenails are normal.    Yvonne was seen today for thyroid problem.    Diagnoses and all orders for this visit:    Hypothyroidism due to Hashimoto's thyroiditis  -     levothyroxine (SYNTHROID/LEVOTHROID) 112 MCG tablet; Take 1 tablet (112 mcg) by mouth daily    Nonintractable episodic headache, unspecified headache type  -     topiramate (TOPAMAX) 25 MG tablet;  Take 1 tablet (25 mg) by mouth 2 times daily    Obesity, unspecified obesity severity, unspecified obesity type  -     topiramate (TOPAMAX) 25 MG tablet; Take 1 tablet (25 mg) by mouth 2 times daily  -     phentermine 37.5 MG capsule; Take 1 capsule (37.5 mg) by mouth every morning    Benign essential hypertension  -     lisinopril-hydrochlorothiazide (PRINZIDE/ZESTORETIC) 20-12.5 MG per tablet; TAKE ONE TABLET BY MOUTH EVERY DAY (NEED TO BE SEEN IN CLINIC FOR FURTHER REFILLS)    Prediabetes  -     metFORMIN (GLUCOPHAGE-XR) 500 MG 24 hr tablet; Take 1 tablet (500 mg) by mouth daily (with dinner)    PCOS (polycystic ovarian syndrome)  -     metFORMIN (GLUCOPHAGE-XR) 500 MG 24 hr tablet; Take 1 tablet (500 mg) by mouth daily (with dinner)      work on lifestyle modification  Recheck in 6 mos .

## 2017-12-29 LAB
FINAL DIAGNOSIS: ABNORMAL
HPV HR 12 DNA CVX QL NAA+PROBE: POSITIVE
HPV16 DNA SPEC QL NAA+PROBE: NEGATIVE
HPV18 DNA SPEC QL NAA+PROBE: NEGATIVE
SPECIMEN DESCRIPTION: ABNORMAL

## 2018-01-04 ENCOUNTER — MYC MEDICAL ADVICE (OUTPATIENT)
Dept: FAMILY MEDICINE | Facility: CLINIC | Age: 33
End: 2018-01-04

## 2018-01-24 DIAGNOSIS — E66.9 OBESITY, UNSPECIFIED OBESITY SEVERITY, UNSPECIFIED OBESITY TYPE: ICD-10-CM

## 2018-01-24 NOTE — TELEPHONE ENCOUNTER
Phentermine      Last Written Prescription Date:  12/28/17  Last Fill Quantity: 30,   # refills: 0  Last Office Visit: 12/28/17  Future Office visit:   none    Routing refill request to provider for review/approval because:  Drug not on the [facility], P or [facility] refill protocol or controlled substance

## 2018-01-25 RX ORDER — PHENTERMINE HYDROCHLORIDE 37.5 MG/1
37.5 CAPSULE ORAL EVERY MORNING
Qty: 30 CAPSULE | Refills: 0 | Status: SHIPPED | OUTPATIENT
Start: 2018-01-25 | End: 2018-02-22

## 2018-01-31 ENCOUNTER — TELEPHONE (OUTPATIENT)
Dept: FAMILY MEDICINE | Facility: CLINIC | Age: 33
End: 2018-01-31

## 2018-01-31 NOTE — TELEPHONE ENCOUNTER
Patient needs copy of medical insurance card. Please leave at . Patient states she gives permission for mom(marie to ).

## 2018-02-22 ENCOUNTER — TELEPHONE (OUTPATIENT)
Dept: FAMILY MEDICINE | Facility: CLINIC | Age: 33
End: 2018-02-22

## 2018-02-22 DIAGNOSIS — E66.9 OBESITY, UNSPECIFIED OBESITY SEVERITY, UNSPECIFIED OBESITY TYPE: ICD-10-CM

## 2018-02-22 DIAGNOSIS — E06.3 HYPOTHYROIDISM DUE TO HASHIMOTO'S THYROIDITIS: ICD-10-CM

## 2018-02-22 RX ORDER — PHENTERMINE HYDROCHLORIDE 37.5 MG/1
37.5 CAPSULE ORAL EVERY MORNING
Qty: 60 CAPSULE | Refills: 0 | Status: SHIPPED | OUTPATIENT
Start: 2018-02-22 | End: 2018-03-19

## 2018-02-22 RX ORDER — LEVOTHYROXINE SODIUM 112 UG/1
112 TABLET ORAL DAILY
Qty: 180 TABLET | Refills: 1 | Status: SHIPPED | OUTPATIENT
Start: 2018-02-22 | End: 2018-08-28

## 2018-02-22 NOTE — TELEPHONE ENCOUNTER
Patient is requesting a refill levothyroxine (SYNTHROID/LEVOTHROID) 112 MCG tablet. Qty of 180 tabs as patient will be uninsured at months end. [facility].

## 2018-03-15 ENCOUNTER — TELEPHONE (OUTPATIENT)
Dept: OBGYN | Facility: CLINIC | Age: 33
End: 2018-03-15

## 2018-03-15 NOTE — TELEPHONE ENCOUNTER
Rescheduled patients [place] to a different day and different provider. Left message for patient to call me back directly CARLOS Cruz

## 2018-03-19 DIAGNOSIS — E66.9 OBESITY, UNSPECIFIED OBESITY SEVERITY, UNSPECIFIED OBESITY TYPE: ICD-10-CM

## 2018-03-19 RX ORDER — PHENTERMINE HYDROCHLORIDE 37.5 MG/1
37.5 CAPSULE ORAL EVERY MORNING
Qty: 60 CAPSULE | Refills: 0 | Status: SHIPPED | OUTPATIENT
Start: 2018-03-19 | End: 2018-07-20

## 2018-03-19 NOTE — TELEPHONE ENCOUNTER
Phentermine      Last Written Prescription Date:  02/22/18  Last Fill Quantity: 60,   # refills: 0  Last Office Visit: 12/28/17  Future Office visit:    Next 5 appointments (look out 90 days)     Apr 12, 2018 10:20 AM CDT   Colposcopy with Rafael Coppola MD, [place] PROCEDURE ROOM 2   [facility] ([facility]    [address] [place] [address] [phone]                   Routing refill request to provider for review/approval because:  Drug not on the [facility], P or  Health refill protocol or controlled substance

## 2018-04-12 ENCOUNTER — OFFICE VISIT (OUTPATIENT)
Dept: OBGYN | Facility: CLINIC | Age: 33
End: 2018-04-12
Payer: MEDICAID

## 2018-04-12 VITALS
DIASTOLIC BLOOD PRESSURE: 82 MMHG | WEIGHT: 282.8 LBS | OXYGEN SATURATION: 98 % | SYSTOLIC BLOOD PRESSURE: 122 MMHG | BODY MASS INDEX: 43.64 KG/M2 | HEART RATE: 99 BPM | TEMPERATURE: 98.4 F

## 2018-04-12 DIAGNOSIS — Z32.00 PREGNANCY EXAMINATION OR TEST, PREGNANCY UNCONFIRMED: Primary | ICD-10-CM

## 2018-04-12 DIAGNOSIS — B97.7 HIGH RISK HPV INFECTION: ICD-10-CM

## 2018-04-12 LAB — BETA HCG QUAL IFA URINE: NEGATIVE

## 2018-04-12 PROCEDURE — 99213 OFFICE O/P EST LOW 20 MIN: CPT | Mod: 25 | Performed by: OBSTETRICS & GYNECOLOGY

## 2018-04-12 PROCEDURE — 57452 EXAM OF CERVIX W/SCOPE: CPT | Performed by: OBSTETRICS & GYNECOLOGY

## 2018-04-12 PROCEDURE — 84703 CHORIONIC GONADOTROPIN ASSAY: CPT | Performed by: OBSTETRICS & GYNECOLOGY

## 2018-04-12 NOTE — PATIENT INSTRUCTIONS
If you have any questions regarding your visit, Please contact your care team.    Women s Health CLINIC HOURS TELEPHONE NUMBER   MD Baylee Hernández CMA Lisa -    SYED Quintero       Monday:       7:30-4:30 [place]  Wednesday:       7:30-4:30 [place]  Thursday:       7:30-1:30 [place]  Friday:       7:30-11:30 [facility]  [address] [place]  [address]  [phone] ask for [facility]  [address] [place] [address]  [phone] ask for [facility]    Imaging Scheduling for [place]:  [phone]    Imaging Scheduling for [place]: [phone]       Urgent Care locations:    [facility] Saturday and Sunday   9 am - 5 pm    Monday-Friday   12 pm - 8 pm  Saturday and Sunday   9 am - 5 pm   [phone]      Labor and Delivery:  [phone]    If you need a medication refill, please contact your pharmacy. Please allow 3 business days for your refill to be completed.  As always, Thank you for trusting us with your healthcare needs!

## 2018-04-12 NOTE — MR AVS SNAPSHOT
After Visit Summary   4/12/2018    Yvonne Jordan    MRN: [id number]           Patient Information     Date Of Birth          1985        Visit Information        Provider Department      4/12/2018 10:20 AM Rafael Coppola MD; [place] PROCEDURE ROOM 2 [facility]        Today's Diagnoses     Pregnancy examination or test, pregnancy unconfirmed    -  1    High risk HPV infection          Care Instructions                                                         If you have any questions regarding your visit, Please contact your care team.    Women s Health CLINIC HOURS TELEPHONE NUMBER   MD Baylee Hernández CMA Lisa -    SYED [place]untero       Monday:       7:30-4:30 [place]  Wednesday:       7:30-4:30 [place]  Thursday:       7:30-1:30 [place]  Friday:       7:30-11:30 [facility]  [address] [place]  [address]  [phone] ask for [facility]  [address] [place] [address]  [phone] ask for [facility]    Imaging Scheduling for [place]:  [phone]    Imaging Scheduling for [place]: [phone]       Urgent Care locations:    [facility] Saturday and Sunday   9 am - 5 pm    Monday-Friday   12 pm - 8 pm  Saturday and Sunday   9 am - 5 pm   [phone]     [facility] Labor and Delivery:  [phone]    If you need a medication refill, please contact your pharmacy. Please allow 3 business days for your refill to be completed.  As always, Thank you for trusting us with your healthcare needs!              Follow-ups after your visit        Follow-up notes from your care team     Return in about 1 year (around 4/12/2019).      Who to contact     If you have questions or need follow up information about today's clinic visit or your schedule please contact [facility] directly at [phone].  Normal or  non-critical lab and imaging results will be communicated to you by MyChart, letter or phone within 4 business days after the clinic has received the results. If you do not hear from us within 7 days, please contact the clinic through [employer] or phone. If you have a critical or abnormal lab result, we will notify you by phone as soon as possible.  Submit refill requests through [employer] or call your pharmacy and they will forward the refill request to us. Please allow 3 business days for your refill to be completed.          Additional Information About Your Visit        Stream[employer] Information     [employer] gives you secure access to your electronic health record. If you see a primary care provider, you can also send messages to your care team and make appointments. If you have questions, please call your primary care clinic.  If you do not have a primary care provider, please call [phone] and they will assist you.        Care EveryWhere ID     This is your Care EveryWhere ID. This could be used by other organizations to access your [facility] medical records  [id number]        Your Vitals Were     Pulse Temperature Last Period Pulse Oximetry BMI (Body Mass Index)       99 98.4  F (36.9  C) (Oral) 04/01/2018 98% 43.64 kg/m2        Blood Pressure from Last 3 Encounters:   04/12/18 122/82   12/28/17 128/84   12/22/17 122/73    Weight from Last 3 Encounters:   04/12/18 282 lb 12.8 oz (128.3 kg)   12/28/17 286 lb (129.7 kg)   12/22/17 286 lb (129.7 kg)              We Performed the Following     Beta HCG qual IFA urine     COLP CERVIX/UPPER VAGINA W BX CERVIX/ENDOCERV CURETT        Primary Care Provider Office Phone # Fax #    Antonio Lance PA-C [phone]       [address]  [place] [address]        Equal Access to Services     [facility] BAR[facility]TT : Hadii andre dewitto Sodavid, waaxda luqadaha, qaybta kaalmada roberto, diana castaneda. So [facility] [phone].    ATENCIÓN: Si  mackenzie holden, tiene a velasquez disposición servicios gratuitos de asistencia lingüística. Josue mitchell [phone].    We comply with applicable federal civil rights laws and [place] laws. We do not discriminate on the basis of race, color, national origin, age, disability, sex, sexual orientation, or gender identity.            Thank you!     Thank you for choosing [facility]  for your care. Our goal is always to provide you with excellent care. Hearing back from our patients is one way we can continue to improve our services. Please take a few minutes to complete the written survey that you may receive in the mail after your visit with us. Thank you!             Your Updated Medication List - Protect others around you: Learn how to safely use, store and throw away your medicines at www.disposemymeds.org.          This list is accurate as of 4/12/18 11:06 AM.  Always use your most recent med list.                   Brand Name Dispense Instructions for use Diagnosis    levothyroxine 112 MCG tablet    SYNTHROID/LEVOTHROID    180 tablet    Take 1 tablet (112 mcg) by mouth daily    Hypothyroidism due to Hashimoto's thyroiditis       lisinopril-hydrochlorothiazide 20-12.5 MG per tablet    PRINZIDE/ZESTORETIC    90 tablet    TAKE ONE TABLET BY MOUTH EVERY DAY (NEED TO BE SEEN IN CLINIC FOR FURTHER REFILLS)    Benign essential hypertension       metFORMIN 500 MG 24 hr tablet    GLUCOPHAGE-XR    90 tablet    Take 1 tablet (500 mg) by mouth daily (with dinner)    Prediabetes, PCOS (polycystic ovarian syndrome)       Multi-vitamin Tabs tablet      Take 1 tablet by mouth daily        phentermine 37.5 MG capsule     60 capsule    Take 1 capsule (37.5 mg) by mouth every morning    Obesity, unspecified obesity severity, unspecified obesity type       topiramate 25 MG tablet    TOPAMAX    180 tablet    Take 1 tablet (25 mg) by mouth 2 times daily    Nonintractable episodic headache, unspecified headache type, Obesity,  unspecified obesity severity, unspecified obesity type

## 2018-04-12 NOTE — PROGRESS NOTES
Yvonne presents for colposcopy. Pap showed: Normal HR HPV+, but has a history of NATHAN 3 10 years ago..     PMH/PSH/Meds/Allergies reviewed & documented in [facility].    I discussed with the patient the results of her pap smear and/or HPV studies.    I discussed our current understanding of abnormal cytology and the role hpv can play in pre-cancerous cervical change.  I explained the current cytological/histologic terminology.      We discussed the screening nature of pap smears and the need for a more definitive examination.    She is given the opportunity to ask questions and have them answered.  She does agree to proceed with colposcopy.    Procedure for colposcopy and biopsy has been explained to the   patient and consent obtained.    PROCEDURE:  Speculum placed in vagina and excellent visualization of cervix achieved, cervix swabbed  with acetic acid solution.    FINDINGS:  Cervix: no visible lesions, no mosaicism, no punctation and no abnormal vasculature; SCJ visualized 360 degrees without lesions and no biopsies taken.    Vaginal inspection: vaginal colposcopy not performed.    Vulvar colposcopy: vulvar colposcopy not performed.    Procedure Summary: Patient tolerated procedure well and colposcopy adequate.    Colposcopic Impression: NOrmal    15-20 minutes were spent in face to face discussion regarding her pap and HPV status.  This was in addition to the time required for the procedure. Discussed the association of HPV and cervical dysplasia and the potential for the HPV to persist.  Discussed that there isn't a defined schedule for repeat colposcopies in the setting of persistent HPV.  If her Pap and HPV are unchanged next year, I would recommend one more colposcopy, at least.    Plan:  Co-testing in 1 year.    Rafael Coppola MD FACOG

## 2018-04-12 NOTE — NURSING NOTE
"Chief Complaint   Patient presents with     Colposcopy     HPV+       Initial /82  Pulse 99  Temp 98.4  F (36.9  C) (Oral)  Wt 282 lb 12.8 oz (128.3 kg)  LMP 04/01/2018  SpO2 98%  BMI 43.64 kg/m2 Estimated body mass index is 43.64 kg/(m^2) as calculated from the following:    Height as of 12/22/17: 5' 7.5\" (1.715 m).    Weight as of this encounter: 282 lb 12.8 oz (128.3 kg).  Medication Reconciliation: complete   Baylee Chirinos, JULIET      "

## 2018-06-28 NOTE — PROGRESS NOTES
SUBJECTIVE:   Yvonne Jordan is a 33 year old female who presents to clinic today for the following health issues:      Hyperlipidemia Follow-Up      Rate your low fat/cholesterol diet?: fair    Taking statin?  No    Other lipid medications/supplements?:  none    Hypothyroidism Follow-up      Since last visit, patient describes the following symptoms: Weight stable, no hair loss, no skin changes, no constipation, no loose stools      Amount of exercise or physical activity: None    Problems taking medications regularly: No    Medication side effects: none    Diet: regular (no restrictions)                 Problem list and histories reviewed & adjusted, as indicated.  Additional history: as documented    Patient Active Problem List   Diagnosis     Hyperlipidemia LDL goal <160     Irregular menses     S/P breast augmentation     NATHAN 3 - cervical intraepithelial neoplasia grade 3     Health Care Home     Obesity, unspecified obesity severity, unspecified obesity type     Hypothyroidism due to Hashimoto's thyroiditis     PCOS (polycystic ovarian syndrome)     High risk HPV infection     Past Surgical History:   Procedure Laterality Date     COLPOSCOPY, BIOPSY, COMBINED  2009     CONIZATION  2005     COSMETIC MAMMOPLASTY AUGMENTATION       MOUTH SURGERY      wisdom teeth extraction       Social History   Substance Use Topics     Smoking status: Never Smoker     Smokeless tobacco: Never Used     Alcohol use Yes      Comment: very rare     Family History   Problem Relation Age of Onset     Arthritis Mother      Thyroid Disease Mother      Hypertension Father      Cancer Maternal Grandmother      Hypertension Maternal Grandmother      Cancer Paternal Grandmother      Non-Hodgkins Lymphoma     Cancer Paternal Grandfather          Current Outpatient Prescriptions   Medication Sig Dispense Refill     levothyroxine (SYNTHROID/LEVOTHROID) 112 MCG tablet Take 1 tablet (112 mcg) by mouth daily 180 tablet 1      lisinopril-hydrochlorothiazide (PRINZIDE/ZESTORETIC) 20-12.5 MG per tablet TAKE ONE TABLET BY MOUTH EVERY DAY (NEED TO BE SEEN IN CLINIC FOR FURTHER REFILLS) 90 tablet 1     metFORMIN (GLUCOPHAGE-XR) 500 MG 24 hr tablet Take 1 tablet (500 mg) by mouth daily (with dinner) 90 tablet 1     multivitamin, therapeutic with minerals (MULTI-VITAMIN) TABS Take 1 tablet by mouth daily       phentermine 37.5 MG capsule Take 1 capsule (37.5 mg) by mouth every morning 60 capsule 0     topiramate (TOPAMAX) 25 MG tablet Take 1 tablet (25 mg) by mouth 2 times daily 180 tablet 1     Allergies   Allergen Reactions     Nkda [No Known Drug Allergies]      Recent Labs   Lab Test  12/22/17   1301  11/03/17   1438  08/25/17   1608  07/28/17   1537  07/12/17   1712   09/16/16   0905   07/29/11   0715   A1C   --    --    --   6.1*  6.2*   --    --    --    --    LDL   --    --    --    --    --    --   81   --   74   HDL   --    --    --    --    --    --   42*   --   57   TRIG   --    --    --    --    --    --   196*   --   260*   ALT  73*  73*  75*  84*  70*   < >   --    --    --    CR   --    --    --   0.63  0.61   < >  0.55   < >   --    GFRESTIMATED   --    --    --   >90  Non  GFR Calc    >90  Non  GFR Calc     < >  >90  Non  GFR Calc     < >   --    GFRESTBLACK   --    --    --   >90   GFR Calc    >90   GFR Calc     < >  >90   GFR Calc     < >   --    POTASSIUM   --    --    --   4.1  4.0   < >   --    < >   --    TSH  3.56  3.51  5.82*  5.91*  6.88*   --   4.86*   < >  2.77    < > = values in this interval not displayed.      BP Readings from Last 3 Encounters:   06/29/18 110/76   04/12/18 122/82   12/28/17 128/84    Wt Readings from Last 3 Encounters:   06/29/18 270 lb 7 oz (122.7 kg)   04/12/18 282 lb 12.8 oz (128.3 kg)   12/28/17 286 lb (129.7 kg)                  Labs reviewed in EPIC    Reviewed and updated as needed this visit by  clinical staff       Reviewed and updated as needed this visit by Provider         All other systems negative except as outline above    OBJECTIVE:  Eye exam - right eye normal lid, conjunctiva, cornea, pupil and fundus, left eye normal lid, conjunctiva, cornea, pupil and fundus.  Thyroid not palpable, not enlarged, no nodules detected.  CHEST:chest clear to IPPA, no tachypnea, retractions or cyanosis and S1, S2 normal, no murmur, no gallop, rate regular.  No edema    Yvonne was seen today for recheck medication, hyperlipidemia, hypothyroidism and health maintenance.    Diagnoses and all orders for this visit:    Screening for HIV (human immunodeficiency virus)  -     HIV Screening    Hypothyroidism due to Hashimoto's thyroiditis  -     TSH    Morbid obesity (H)    Fatty liver  -     Hepatic panel (Albumin, ALT, AST, Bili, Alk Phos, TP)    Nonspecific elevation of levels of transaminase or lactic acid dehydrogenase (LDH)  -     Hepatic panel (Albumin, ALT, AST, Bili, Alk Phos, TP)    Benign essential hypertension  -     BASIC METABOLIC PANEL  -     Lipid panel reflex to direct LDL Fasting; Future    Insulin resistance  -     liraglutide (VICTOZA) 18 MG/3ML soln; Inject 1.2 mg Subcutaneous daily  -     Hemoglobin A1c    PCOS (polycystic ovarian syndrome)      work on lifestyle modification  Recheck in 6 mos

## 2018-06-29 ENCOUNTER — OFFICE VISIT (OUTPATIENT)
Dept: FAMILY MEDICINE | Facility: CLINIC | Age: 33
End: 2018-06-29
Payer: MEDICAID

## 2018-06-29 ENCOUNTER — TELEPHONE (OUTPATIENT)
Dept: FAMILY MEDICINE | Facility: CLINIC | Age: 33
End: 2018-06-29

## 2018-06-29 VITALS
DIASTOLIC BLOOD PRESSURE: 76 MMHG | HEART RATE: 127 BPM | WEIGHT: 270.44 LBS | TEMPERATURE: 98.3 F | HEIGHT: 67 IN | OXYGEN SATURATION: 96 % | SYSTOLIC BLOOD PRESSURE: 110 MMHG | BODY MASS INDEX: 42.45 KG/M2

## 2018-06-29 DIAGNOSIS — K76.0 FATTY LIVER: ICD-10-CM

## 2018-06-29 DIAGNOSIS — E28.2 PCOS (POLYCYSTIC OVARIAN SYNDROME): ICD-10-CM

## 2018-06-29 DIAGNOSIS — E66.01 MORBID OBESITY (H): ICD-10-CM

## 2018-06-29 DIAGNOSIS — R74.01 NONSPECIFIC ELEVATION OF LEVELS OF TRANSAMINASE OR LACTIC ACID DEHYDROGENASE (LDH): ICD-10-CM

## 2018-06-29 DIAGNOSIS — E88.819 INSULIN RESISTANCE: ICD-10-CM

## 2018-06-29 DIAGNOSIS — R74.02 NONSPECIFIC ELEVATION OF LEVELS OF TRANSAMINASE OR LACTIC ACID DEHYDROGENASE (LDH): ICD-10-CM

## 2018-06-29 DIAGNOSIS — Z11.4 SCREENING FOR HIV (HUMAN IMMUNODEFICIENCY VIRUS): Primary | ICD-10-CM

## 2018-06-29 DIAGNOSIS — E06.3 HYPOTHYROIDISM DUE TO HASHIMOTO'S THYROIDITIS: ICD-10-CM

## 2018-06-29 DIAGNOSIS — E28.2 PCOS (POLYCYSTIC OVARIAN SYNDROME): Primary | ICD-10-CM

## 2018-06-29 DIAGNOSIS — I10 BENIGN ESSENTIAL HYPERTENSION: ICD-10-CM

## 2018-06-29 LAB
ALBUMIN SERPL-MCNC: 3.8 G/DL (ref 3.4–5)
ALP SERPL-CCNC: 42 U/L (ref 40–150)
ALT SERPL W P-5'-P-CCNC: 53 U/L (ref 0–50)
ANION GAP SERPL CALCULATED.3IONS-SCNC: 9 MMOL/L (ref 3–14)
AST SERPL W P-5'-P-CCNC: 39 U/L (ref 0–45)
BILIRUB DIRECT SERPL-MCNC: 0.1 MG/DL (ref 0–0.2)
BILIRUB SERPL-MCNC: 0.3 MG/DL (ref 0.2–1.3)
BUN SERPL-MCNC: 14 MG/DL (ref 7–30)
CALCIUM SERPL-MCNC: 9.2 MG/DL (ref 8.5–10.1)
CHLORIDE SERPL-SCNC: 106 MMOL/L (ref 94–109)
CO2 SERPL-SCNC: 24 MMOL/L (ref 20–32)
CREAT SERPL-MCNC: 0.72 MG/DL (ref 0.52–1.04)
GFR SERPL CREATININE-BSD FRML MDRD: >90 ML/MIN/1.7M2
GLUCOSE SERPL-MCNC: 98 MG/DL (ref 70–99)
HBA1C MFR BLD: 5.6 % (ref 0–5.6)
POTASSIUM SERPL-SCNC: 4.1 MMOL/L (ref 3.4–5.3)
PROT SERPL-MCNC: 8.4 G/DL (ref 6.8–8.8)
SODIUM SERPL-SCNC: 139 MMOL/L (ref 133–144)
TSH SERPL DL<=0.005 MIU/L-ACNC: <0.01 MU/L (ref 0.4–4)

## 2018-06-29 PROCEDURE — 84443 ASSAY THYROID STIM HORMONE: CPT | Performed by: PHYSICIAN ASSISTANT

## 2018-06-29 PROCEDURE — 87389 HIV-1 AG W/HIV-1&-2 AB AG IA: CPT | Performed by: PHYSICIAN ASSISTANT

## 2018-06-29 PROCEDURE — 83036 HEMOGLOBIN GLYCOSYLATED A1C: CPT | Performed by: PHYSICIAN ASSISTANT

## 2018-06-29 PROCEDURE — 99214 OFFICE O/P EST MOD 30 MIN: CPT | Performed by: PHYSICIAN ASSISTANT

## 2018-06-29 PROCEDURE — 80076 HEPATIC FUNCTION PANEL: CPT | Performed by: PHYSICIAN ASSISTANT

## 2018-06-29 PROCEDURE — 80048 BASIC METABOLIC PNL TOTAL CA: CPT | Performed by: PHYSICIAN ASSISTANT

## 2018-06-29 PROCEDURE — 36415 COLL VENOUS BLD VENIPUNCTURE: CPT | Performed by: PHYSICIAN ASSISTANT

## 2018-06-29 RX ORDER — LIRAGLUTIDE 6 MG/ML
1.2 INJECTION SUBCUTANEOUS DAILY
Qty: 6 ML | Refills: 3 | Status: SHIPPED | OUTPATIENT
Start: 2018-06-29 | End: 2018-10-08

## 2018-06-29 NOTE — TELEPHONE ENCOUNTER
Prior Authorization Retail Medication Request    Medication/Dose: Victoza 18mg/3ml Soln   ICD code (if different than what is on RX):  Insulin resistance [E88.81]  Previously Tried and Failed:    Rationale:      Insurance Name:  VIVEK CASTELLANOS  Insurance ID:  [id number]      Pharmacy Information (if different than what is on RX)  Name:  [facility]  Phone:  [phone]

## 2018-06-29 NOTE — MR AVS SNAPSHOT
After Visit Summary   6/29/2018    Yvonne Jordan    MRN: [id number]           Patient Information     Date Of Birth          1985        Visit Information        Provider Department      6/29/2018 2:00 PM Antonio Lance PA-C [facility] [place]        Today's Diagnoses     Screening for HIV (human immunodeficiency virus)    -  1    Hypothyroidism due to Hashimoto's thyroiditis        Morbid obesity (H)        Fatty liver        Nonspecific elevation of levels of transaminase or lactic acid dehydrogenase (LDH)        Benign essential hypertension        Insulin resistance        PCOS (polycystic ovarian syndrome)           Follow-ups after your visit        Future tests that were ordered for you today     Open Future Orders        Priority Expected Expires Ordered    Lipid panel reflex to direct LDL Fasting Routine  6/29/2019 6/29/2018            Who to contact     Normal or non-critical lab and imaging results will be communicated to you by [employer]ling[employer], letter or phone within 4 business days after the clinic has received the results. If you do not hear from us within 7 days, please contact the clinic through [employer] or phone. If you have a critical or abnormal lab result, we will notify you by phone as soon as possible.  Submit refill requests through [employer] or call your pharmacy and they will forward the refill request to us. Please allow 3 business days for your refill to be completed.          If you need to speak with a  for additional information , please call: [phone]             Additional Information About Your Visit        [employer] Information     [employer] gives you secure access to your electronic health record. If you see a primary care provider, you can also send messages to your care team and make appointments. If you have questions, please call your primary care clinic.  If you do not have a primary care provider, please call [phone] and they will assist  "you.        Care EveryWhere ID     This is your Care EveryWhere ID. This could be used by other organizations to access your [place] medical records  [id number]        Your Vitals Were     Pulse Temperature Height Pulse Oximetry BMI (Body Mass Index)       127 98.3  F (36.8  C) (Oral) 5' 7\" (1.702 m) 96% 42.36 kg/m2        Blood Pressure from Last 3 Encounters:   06/29/18 110/76   04/12/18 122/82   12/28/17 128/84    Weight from Last 3 Encounters:   06/29/18 270 lb 7 oz (122.7 kg)   04/12/18 282 lb 12.8 oz (128.3 kg)   12/28/17 286 lb (129.7 kg)              We Performed the Following     BASIC METABOLIC PANEL     Hemoglobin A1c     Hepatic panel (Albumin, ALT, AST, Bili, Alk Phos, TP)     HIV Screening     TSH          Today's Medication Changes          These changes are accurate as of 6/29/18  2:14 PM.  If you have any questions, ask your nurse or doctor.               Start taking these medicines.        Dose/Directions    liraglutide 18 MG/3ML soln   Commonly known as:  VICTOZA   Used for:  Insulin resistance   Started by:  Antonio Lance PA-C        Dose:  1.2 mg   Inject 1.2 mg Subcutaneous daily   Quantity:  6 mL   Refills:  3            Where to get your medicines      These medications were sent to [facility] [address] [address] [address] [address]     Phone:  [phone]     liraglutide 18 MG/3ML soln                Primary Care Provider Office Phone # Fax #    Antonio Lance PA-C [phone]       [address]  [place] [address]        Equal Access to Services     LINDA HYDE AH: Hadii andre dewitto Sodavid, waaxda luqadaha, qaybta kaalmada adeegyada, diana josephn jolynn castaneda. So [facility] [phone].    ATENCIÓN: Si habla español, tiene a velasquez disposición servicios gratuitos de asistencia lingüística. Llame al [phone].    We comply with applicable federal civil rights laws and [place] laws. We do " not discriminate on the basis of race, color, national origin, age, disability, sex, sexual orientation, or gender identity.            Thank you!     Thank you for choosing [facility]  for your care. Our goal is always to provide you with excellent care. Hearing back from our patients is one way we can continue to improve our services. Please take a few minutes to complete the written survey that you may receive in the mail after your visit with us. Thank you!             Your Updated Medication List - Protect others around you: Learn how to safely use, store and throw away your medicines at www.disposemymeds.org.          This list is accurate as of 6/29/18  2:14 PM.  Always use your most recent med list.                   Brand Name Dispense Instructions for use Diagnosis    levothyroxine 112 MCG tablet    SYNTHROID/LEVOTHROID    180 tablet    Take 1 tablet (112 mcg) by mouth daily    Hypothyroidism due to Hashimoto's thyroiditis       liraglutide 18 MG/3ML soln    VICTOZA    6 mL    Inject 1.2 mg Subcutaneous daily    Insulin resistance       lisinopril-hydrochlorothiazide 20-12.5 MG per tablet    PRINZIDE/ZESTORETIC    90 tablet    TAKE ONE TABLET BY MOUTH EVERY DAY (NEED TO BE SEEN IN CLINIC FOR FURTHER REFILLS)    Benign essential hypertension       metFORMIN 500 MG 24 hr tablet    GLUCOPHAGE-XR    90 tablet    Take 1 tablet (500 mg) by mouth daily (with dinner)    Prediabetes, PCOS (polycystic ovarian syndrome)       Multi-vitamin Tabs tablet      Take 1 tablet by mouth daily        phentermine 37.5 MG capsule     60 capsule    Take 1 capsule (37.5 mg) by mouth every morning    Obesity, unspecified obesity severity, unspecified obesity type       topiramate 25 MG tablet    TOPAMAX    180 tablet    Take 1 tablet (25 mg) by mouth 2 times daily    Nonintractable episodic headache, unspecified headache type, Obesity, unspecified obesity severity, unspecified obesity type

## 2018-06-29 NOTE — TELEPHONE ENCOUNTER
Prior authorization required for : Victoza 18mg/3ml Soln  Insurance plan: [place] Medicaid  Patient Id: [id number]  Bin Number: [id number]  Pcn:   Help Desk Number: [phone]    Please fax over an alternative medication to the pharmacy or if you are going to pursue a prior authorization please contact the pharmacy and patient when approved. Thanks!    Olya Murphy [facility] [place]

## 2018-07-02 LAB — HIV 1+2 AB+HIV1 P24 AG SERPL QL IA: NONREACTIVE

## 2018-07-02 NOTE — TELEPHONE ENCOUNTER
PRIOR AUTHORIZATION DENIED    Medication: Victoza 18mg/3ml Soln -DENIED    Denial Date: 7/2/2018    Denial Rational: Does not meet diagnosis requirements        Appeal Information:

## 2018-07-02 NOTE — TELEPHONE ENCOUNTER
Central Prior Authorization Team   Phone: [phone]      PA Initiation    Medication: Victoza 18mg/3ml Soln -Initiated  Insurance Company: [place] Medicaid ([facility]) - Phone [phone] Fax [phone]  Pharmacy Filling the Rx: [facility] - [id number] [facility] [place]  Filling Pharmacy Phone: [phone]  Filling Pharmacy Fax:    Start Date: 7/2/2018

## 2018-07-03 NOTE — TELEPHONE ENCOUNTER
Prior Authorization Not Needed per Insurance    Medication: Victoza 18mg/3ml Soln -PA NOT NEEDED  Insurance Company: [place] Medicaid ([facility]) - Phone [phone] Fax [phone]  Expected CoPay:      Pharmacy Filling the Rx: [facility] - [id number] [facility]  Pharmacy Notified: Yes  Patient Notified: No    Called pharmacy to state PA is not required since 7/1/18.  Pharmacy was able to run medication through insurance.

## 2018-07-15 DIAGNOSIS — R73.03 PREDIABETES: ICD-10-CM

## 2018-07-15 DIAGNOSIS — E28.2 PCOS (POLYCYSTIC OVARIAN SYNDROME): ICD-10-CM

## 2018-07-16 RX ORDER — METFORMIN HCL 500 MG
TABLET, EXTENDED RELEASE 24 HR ORAL
Qty: 90 TABLET | Refills: 1 | Status: SHIPPED | OUTPATIENT
Start: 2018-07-16 | End: 2018-11-20

## 2018-07-16 NOTE — TELEPHONE ENCOUNTER
"Requested Prescriptions   Pending Prescriptions Disp Refills     metFORMIN (GLUCOPHAGE-XR) 500 MG 24 hr tablet [Pharmacy Med Name: METFORMIN HCL ER 500MG TB24] 90 tablet 1    Last Written Prescription Date:  06/18/18  Last Fill Quantity: 90,  # refills: 1   Last office visit: 6/29/2018 with prescribing provider:  MAME Lance Future Office Visit:     Sig: TAKE ONE TABLET BY MOUTH EVERY DAY WITH DINNER    Biguanide Agents Passed    7/15/2018 10:12 PM       Passed - Blood pressure less than 140/90 in past 6 months    BP Readings from Last 3 Encounters:   06/29/18 110/76   04/12/18 122/82   12/28/17 128/84                Passed - Patient is age 10 or older       Passed - Recent (12 mo) or future (30 days) visit within the authorizing provider's specialty     Patient had office visit in the last 12 months or has a visit in the next 30 days with authorizing provider or within the authorizing provider's specialty.  See \"Patient Info\" tab in inbasket, or \"Choose Columns\" in Meds & Orders section of the refill encounter.           Passed - Patient does NOT have a diagnosis of CHF.       Passed - Patient is not pregnant       Passed - Patient has not had a positive pregnancy test within the past 12 mos.           "

## 2018-07-20 ENCOUNTER — TELEPHONE (OUTPATIENT)
Dept: FAMILY MEDICINE | Facility: CLINIC | Age: 33
End: 2018-07-20

## 2018-07-20 DIAGNOSIS — E66.9 OBESITY, UNSPECIFIED OBESITY SEVERITY, UNSPECIFIED OBESITY TYPE: ICD-10-CM

## 2018-07-20 RX ORDER — PHENTERMINE HYDROCHLORIDE 37.5 MG/1
37.5 CAPSULE ORAL EVERY MORNING
Qty: 60 CAPSULE | Refills: 0 | Status: SHIPPED | OUTPATIENT
Start: 2018-07-20 | End: 2018-09-04

## 2018-07-20 NOTE — TELEPHONE ENCOUNTER
Patient requesting refill of phentermine to [facility] pharmacy. Last O.V. 06/29/18. Please sign if appropriate. Thank you

## 2018-07-31 DIAGNOSIS — R51.9 NONINTRACTABLE EPISODIC HEADACHE, UNSPECIFIED HEADACHE TYPE: ICD-10-CM

## 2018-07-31 DIAGNOSIS — E06.3 HYPOTHYROIDISM DUE TO HASHIMOTO'S THYROIDITIS: Primary | ICD-10-CM

## 2018-07-31 DIAGNOSIS — E66.9 OBESITY, UNSPECIFIED OBESITY SEVERITY, UNSPECIFIED OBESITY TYPE: ICD-10-CM

## 2018-08-01 RX ORDER — TOPIRAMATE 25 MG/1
TABLET, FILM COATED ORAL
Qty: 180 TABLET | Refills: 1 | Status: SHIPPED | OUTPATIENT
Start: 2018-08-01 | End: 2018-11-20

## 2018-08-01 NOTE — TELEPHONE ENCOUNTER
"Requested Prescriptions   Pending Prescriptions Disp Refills     topiramate (TOPAMAX) 25 MG tablet [Pharmacy Med Name: TOPIRAMATE 25MG TABS] 180 tablet 1    Last Written Prescription Date:  7-3-18  Last Fill Quantity: 180,  # refills: 1   Last office visit: 6/29/2018 with prescribing provider:  6-29-18   Future Office Visit:   Sig: TAKE ONE TABLET BY MOUTH TWICE A DAY    Anti-Seizure Meds Protocol  Failed    7/31/2018  9:43 PM       Failed - Review Authorizing provider's last note.     Refer to last progress notes: confirm request is for original authorizing provider (cannot be through other providers).         Failed - Normal ALT or AST on file in past 26 months    Recent Labs   Lab Test  06/29/18   1417   ALT  53*     Recent Labs   Lab Test  06/29/18   1417   AST  39            Passed - Recent (12 mo) or future (30 days) visit within the authorizing provider's specialty    Patient had office visit in the last 12 months or has a visit in the next 30 days with authorizing provider or within the authorizing provider's specialty.  See \"Patient Info\" tab in inbasket, or \"Choose Columns\" in Meds & Orders section of the refill encounter.           Passed - Normal CBC on file in past 26 months    Recent Labs   Lab Test  07/12/17   1712   WBC  10.3   RBC  4.03   HGB  12.4   HCT  39.3   PLT  211       For [facility] ONLY: [id number] = WBC, BIYV808 = RBC         Passed - Normal platelet count on file in past 26 months    Recent Labs   Lab Test  07/12/17   1712   PLT  211              Passed - No active pregnancy on record       Passed - No positive pregnancy test in last 12 months        "

## 2018-08-01 NOTE — TELEPHONE ENCOUNTER
Prescription approved per [facility] Refill Protocol.  Pt needs thyroid recheck- order placed.  Please call to schedule lab appt.  Cindy Vallejo RN

## 2018-08-02 ENCOUNTER — MYC MEDICAL ADVICE (OUTPATIENT)
Dept: FAMILY MEDICINE | Facility: CLINIC | Age: 33
End: 2018-08-02

## 2018-08-10 ENCOUNTER — OFFICE VISIT (OUTPATIENT)
Dept: FAMILY MEDICINE | Facility: CLINIC | Age: 33
End: 2018-08-10
Payer: MEDICAID

## 2018-08-10 ENCOUNTER — RADIANT APPOINTMENT (OUTPATIENT)
Dept: GENERAL RADIOLOGY | Facility: CLINIC | Age: 33
End: 2018-08-10
Attending: NURSE PRACTITIONER
Payer: MEDICAID

## 2018-08-10 VITALS
RESPIRATION RATE: 20 BRPM | BODY MASS INDEX: 41.38 KG/M2 | TEMPERATURE: 98.6 F | DIASTOLIC BLOOD PRESSURE: 78 MMHG | OXYGEN SATURATION: 98 % | SYSTOLIC BLOOD PRESSURE: 125 MMHG | HEART RATE: 116 BPM | WEIGHT: 264.2 LBS

## 2018-08-10 DIAGNOSIS — M79.671 PAIN OF RIGHT HEEL: ICD-10-CM

## 2018-08-10 DIAGNOSIS — E06.3 HYPOTHYROIDISM DUE TO HASHIMOTO'S THYROIDITIS: ICD-10-CM

## 2018-08-10 DIAGNOSIS — I10 BENIGN ESSENTIAL HYPERTENSION: ICD-10-CM

## 2018-08-10 DIAGNOSIS — M79.671 PAIN OF RIGHT HEEL: Primary | ICD-10-CM

## 2018-08-10 LAB
CHOLEST SERPL-MCNC: 168 MG/DL
HDLC SERPL-MCNC: 46 MG/DL
LDLC SERPL CALC-MCNC: 90 MG/DL
NONHDLC SERPL-MCNC: 122 MG/DL
T4 FREE SERPL-MCNC: 1.52 NG/DL (ref 0.76–1.46)
TRIGL SERPL-MCNC: 162 MG/DL
TSH SERPL DL<=0.005 MIU/L-ACNC: 0.01 MU/L (ref 0.4–4)

## 2018-08-10 PROCEDURE — 99213 OFFICE O/P EST LOW 20 MIN: CPT | Performed by: NURSE PRACTITIONER

## 2018-08-10 PROCEDURE — 84439 ASSAY OF FREE THYROXINE: CPT | Performed by: PHYSICIAN ASSISTANT

## 2018-08-10 PROCEDURE — 80061 LIPID PANEL: CPT | Performed by: PHYSICIAN ASSISTANT

## 2018-08-10 PROCEDURE — 73630 X-RAY EXAM OF FOOT: CPT | Mod: RT

## 2018-08-10 PROCEDURE — 84443 ASSAY THYROID STIM HORMONE: CPT | Performed by: PHYSICIAN ASSISTANT

## 2018-08-10 PROCEDURE — 36415 COLL VENOUS BLD VENIPUNCTURE: CPT | Performed by: PHYSICIAN ASSISTANT

## 2018-08-10 RX ORDER — CYCLOBENZAPRINE HCL 10 MG
10 TABLET ORAL
Qty: 14 TABLET | Refills: 1 | Status: SHIPPED | OUTPATIENT
Start: 2018-08-10 | End: 2018-10-26

## 2018-08-10 NOTE — PROGRESS NOTES
SUBJECTIVE:   Yvonne Jordan is a 33 year old female who presents to clinic today for the following health issues:      heel Pain     Onset: 2 weeks    Description:   Location: heel pain-right heel and bump  Character: Sharp    Progression of Symptoms: better    Accompanying Signs & Symptoms:  Other symptoms: numb    Precipitating factors:   Trauma or overuse: lots of walking- [place] hours [place] festival with sandals on   Alleviating factors:  Improved by: nothing  Therapies Tried and outcome: none         Problem list and histories reviewed & adjusted, as indicated.  Additional history: as documented    Patient Active Problem List   Diagnosis     Hyperlipidemia LDL goal <160     Irregular menses     S/P breast augmentation     NATHAN 3 - cervical intraepithelial neoplasia grade 3     Health Care Home     Obesity, unspecified obesity severity, unspecified obesity type     Hypothyroidism due to Hashimoto's thyroiditis     PCOS (polycystic ovarian syndrome)     High risk HPV infection     Morbid obesity (H)     Fatty liver     Past Surgical History:   Procedure Laterality Date     COLPOSCOPY, BIOPSY, COMBINED  2009     CONIZATION  2005     COSMETIC MAMMOPLASTY AUGMENTATION       MOUTH SURGERY      wisdom teeth extraction       Social History   Substance Use Topics     Smoking status: Never Smoker     Smokeless tobacco: Never Used     Alcohol use Yes      Comment: very rare     Family History   Problem Relation Age of Onset     Arthritis Mother      Thyroid Disease Mother      Hypertension Father      Cancer Maternal Grandmother      Hypertension Maternal Grandmother      Cancer Paternal Grandmother      Non-Hodgkins Lymphoma     Cancer Paternal Grandfather          Current Outpatient Prescriptions   Medication Sig Dispense Refill     cyclobenzaprine (FLEXERIL) 10 MG tablet Take 1 tablet (10 mg) by mouth nightly as needed for muscle spasms 14 tablet 1     insulin pen needle 31G X 6 MM Use once daily or as directed. 100 each 11  "    levothyroxine (SYNTHROID/LEVOTHROID) 112 MCG tablet Take 1 tablet (112 mcg) by mouth daily 180 tablet 1     liraglutide (VICTOZA) 18 MG/3ML soln Inject 1.2 mg Subcutaneous daily 6 mL 3     lisinopril-hydrochlorothiazide (PRINZIDE/ZESTORETIC) 20-12.5 MG per tablet TAKE ONE TABLET BY MOUTH EVERY DAY (NEED TO BE SEEN IN CLINIC FOR FURTHER REFILLS) 90 tablet 1     metFORMIN (GLUCOPHAGE-XR) 500 MG 24 hr tablet TAKE ONE TABLET BY MOUTH EVERY DAY WITH DINNER 90 tablet 1     multivitamin, therapeutic with minerals (MULTI-VITAMIN) TABS Take 1 tablet by mouth daily       phentermine 37.5 MG capsule Take 1 capsule (37.5 mg) by mouth every morning 60 capsule 0     topiramate (TOPAMAX) 25 MG tablet TAKE ONE TABLET BY MOUTH TWICE A  tablet 1     Allergies   Allergen Reactions     Nkda [No Known Drug Allergies]        Reviewed and updated as needed this visit by clinical staff  Tobacco  Allergies  Meds  Med Hx  Surg Hx  Fam Hx  Soc Hx      Reviewed and updated as needed this visit by Provider         ROS:  Constitutional, HEENT, cardiovascular, pulmonary, gi and gu systems are negative, except as otherwise noted.    OBJECTIVE:     /78  Pulse 116  Temp 98.6  F (37  C) (Oral)  Resp 20  Wt 264 lb 3.2 oz (119.8 kg)  SpO2 98%  BMI 41.38 kg/m2  Body mass index is 41.38 kg/(m^2).  GENERAL: healthy, alert and no distress  MS:  edema to right foot and RLE exam shows soft dime sized non tender lump.  SKIN: no suspicious lesions or rashes  PSYCH: mentation appears normal, affect normal/bright    Diagnostic Test Results:  Xray - shows small bone spur    ASSESSMENT/PLAN:       BMI:   Estimated body mass index is 41.38 kg/(m^2) as calculated from the following:    Height as of 6/29/18: 5' 7\" (1.702 m).    Weight as of this encounter: 264 lb 3.2 oz (119.8 kg).   Pt states she has lost 43 pounds on phentermine. She states she will continue to work on her diet and exercise program.       ICD-10-CM    1. Pain of right " heel M79.671 XR Foot Right G/E 3 Views     cyclobenzaprine (FLEXERIL) 10 MG tablet    bone spur; planter fasciitis.    2. Benign essential hypertension I10 Lipid panel reflex to direct LDL Fasting   3. Hypothyroidism due to Hashimoto's thyroiditis E03.8 **TSH with free T4 reflex FUTURE anytime    E06.3 T4, free       MEDICATIONS:  Continue current medications without change  See Patient Instructions  Patient Instructions     Well supported shoes at all times with Dr. Nava shoe inserts. Flexeril if needed at night. If pain persists or worsens,  Let me know and I will refer you to podiatry.  Follow below tips as well.      Heel Spurs    The plantar fascia is a thick, fibrous layer of tissue that covers the bones on the bottom of your foot. It holds the foot bones in an arched position. Plantar fasciitis is a painful swelling of the plantar fascia.  A heel spur is an overgrowth of bone where the plantar fascia attaches to the heel bone. The heel spur itself usually doesn t cause pain. However, the heel spur might be a sign of plantar fasciitis which may cause your foot pain. There is no specific treatment for heel spurs.   Plantar fasciitis can develop slowly or suddenly. It usually affects one foot at a time. Heel pain can feel sharp, like a knife sticking into the bottom of your foot. You may feel pain after exercising, long-distance jogging, stair climbing, long periods of standing, or after standing up.  Risk factors for plantar fasciitis include: arthritis, diabetes, obesity or recent weight gain, flat foot, and having high arches. Wearing high heels, loose shoes, or shoes with poor arch support adds to the risk.    Foot pain is usually worse in the morning. But it improves with walking. By the end of the day there may be a dull aching. Treatment includes short-term rest and controlling inflammation. It may take up to 9 months before all symptoms go away. In rare cases, a steroid injection in the foot, or  surgery, may be needed.  Home care    If you are overweight, lose weight to help healing.    Choose supportive shoes with good arch support and shock absorbency. Replace athletic shoes when they become worn out. Don t walk or run barefoot.    Premade or custom-fitted shoe inserts may be helpful. Inserts made of silicone seem to be the most effective. Custom-made inserts can be provided by a podiatrist or foot specialist, physical therapist, or orthopedist.    Premade or custom-made night splints keep the heel stretched out while you sleep. They may prevent morning pain.    Avoid activities that stress the feet: jogging, prolonged standing or walking, contact sports, etc.    First thing in the morning and before sports, stretch the bottom of your foot. Gently flex your ankle so the toes move toward your knee.    Icing may help control heel pain. Apply an ice pack to the heel for 10-20 minutes as a preventive. Or ice your heel after a severe flare-up of symptoms. You may repeat this every 1-2 hours as needed.    You may use over-the-counter pain medicine to control pain, unless another medicine was prescribed. Anti-inflammatory pain medicines, such as ibuprofen or naproxen, may work better than acetaminophen. If you have chronic liver or kidney disease or ever had a stomach ulcer or GI bleeding, talk with your healthcare provider before using these medicines.    Shoe inserts, a night splint, or a special boot may be needed. Use these as directed by your healthcare provider.  Follow-up care   Follow up with your healthcare provider, physical therapist, or podiatrist or foot specialist as advised.  When to seek medical advice  Call your healthcare provider right away if any of these occur:    The pain worsens.    There is no relief after a few weeks of home treatment.   Date Last Reviewed: 11/23/2015 2000-2017 [employer]. [address] [place] [address], [place] [address]. All rights reserved. This information  is not intended as a substitute for professional medical care. Always follow your healthcare professional's instructions.        Understanding Heel Pain    Your heel is the back part of your foot. A band of tissue called the plantar fascia connects the heel bone to the bones in the ball of your foot. Nerves run from the heel up the inside of your ankle and into your leg. When you feel pain in the bottom of your heel, the plantar fascia may be inflamed. Overuse, Achilles tightness, and excess body weight can cause the tissue to tear or pull away from the bone. Sometimes the inflamed plantar fascia also irritates a nerve, causing more pain.  What causes heel pain?  Wearing shoes with poor cushioning can irritate the tissue in your heel (plantar fascia). Being overweight or standing for long periods can also irritate the tissue. Running, walking, tennis, and other sports that put stress on the heels can cause tiny tears in the tissue. If your lower leg muscles are tight, this is more likely to happen. A tight Achilles tendon will also contribute to heel pain.  Symptoms  You may feel pain on the bottom or on the inside edge of your heel. The pain may be sharp when you get out of bed or when you stand up after sitting for a while. You may feel a dull ache in your heel after you ve been standing for a long time on a hard surface. Running can also cause a dull ache.  Preventing future problems  To prevent future heel pain, wear shoes with well-cushioned heels. And do exercises prescribed by your healthcare provider to stretch the plantar fascia and the muscles in the lower leg.   Date Last Reviewed: 10/1/2017    1246-9018 [employer] [address] [place] [address]. All rights reserved. This information is not intended as a substitute for professional medical care. Always follow your healthcare professional's instructions.        Treating Plantar Fasciitis  First, your healthcare provider tries  to find out the cause of your problem. He or she can then suggest ways to ease pain. If your pain is due to poor foot mechanics, occasionally custom-made shoe inserts (orthoses) may help.    Ease symptoms    To relieve mild symptoms, try aspirin, ibuprofen, or other medicines as directed. Rubbing ice on the area may also help.    To lessen severe pain and swelling, your healthcare provider may give you pills or injections. In some cases, you may need a walking cast. Physical therapy, such as ultrasound or a daily stretching program, may also be recommended. Surgery is rarely needed.    To ease symptoms caused by poor foot mechanics, your foot may be taped. This supports the arch and temporarily controls movement. Night splints may also help by stretching the fascia.  Control movement  If taping helps, your healthcare provider may prescribe orthoses. These are inserts built from plaster casts of your feet. They control the way your foot moves. As a result, your symptoms may go away.  Reduce overuse  Every time your foot strikes the ground, the plantar fascia is stretched. You can lessen the strain on the plantar fascia and the chance of overuse by:    Losing any excess weight    Not running on hard or uneven ground    Using orthoses, if recommended, in your shoes and house slippers  If surgery is needed  Your healthcare provider may consider surgery if other types of treatment don't control your pain. During surgery, the plantar fascia is partially cut to release tension. As you heal, fibrous tissue fills the space between the heel bone and the plantar fascia.   Date Last Reviewed: 1/1/2018 2000-2017 [employer] [address] [place] [address]. All rights reserved. This information is not intended as a substitute for professional medical care. Always follow your healthcare professional's instructions.            Patient Education:  Pt was given education on proper footwear for bone spurs.  She was told to wear supportive footwear and to buy a heel cup support at her local store. She was told to wear a separate pair of shoes for her house, do not wear flip flops anymore, and do not be barefoot. She was told that she may obtain custom made orthotics if this plan does not help. She understood all education and had no complaints.     Written by SAIMA Veira acting as a scribe for  TRENTON Somers    Student exam observed as well as completed by provider.  Agree with above documentation.   [facility] JAN

## 2018-08-10 NOTE — MR AVS SNAPSHOT
After Visit Summary   8/10/2018    Yvonne Jordan    MRN: [id number]           Patient Information     Date Of Birth          1985        Visit Information        Provider Department      8/10/2018 1:40 PM Sophia Gallegos NP [facility]        Today's Diagnoses     Pain of right heel    -  1    Benign essential hypertension        Hypothyroidism due to Hashimoto's thyroiditis          Care Instructions    Well supported shoes at all times with Dr. Nava shoe inserts. Flexeril if needed at night. If pain persists or worsens,  Let me know and I will refer you to podiatry.  Follow below tips as well.      Heel Spurs    The plantar fascia is a thick, fibrous layer of tissue that covers the bones on the bottom of your foot. It holds the foot bones in an arched position. Plantar fasciitis is a painful swelling of the plantar fascia.  A heel spur is an overgrowth of bone where the plantar fascia attaches to the heel bone. The heel spur itself usually doesn t cause pain. However, the heel spur might be a sign of plantar fasciitis which may cause your foot pain. There is no specific treatment for heel spurs.   Plantar fasciitis can develop slowly or suddenly. It usually affects one foot at a time. Heel pain can feel sharp, like a knife sticking into the bottom of your foot. You may feel pain after exercising, long-distance jogging, stair climbing, long periods of standing, or after standing up.  Risk factors for plantar fasciitis include: arthritis, diabetes, obesity or recent weight gain, flat foot, and having high arches. Wearing high heels, loose shoes, or shoes with poor arch support adds to the risk.    Foot pain is usually worse in the morning. But it improves with walking. By the end of the day there may be a dull aching. Treatment includes short-term rest and controlling inflammation. It may take up to 9 months before all symptoms go away. In rare cases, a steroid injection in the  foot, or surgery, may be needed.  Home care    If you are overweight, lose weight to help healing.    Choose supportive shoes with good arch support and shock absorbency. Replace athletic shoes when they become worn out. Don t walk or run barefoot.    Premade or custom-fitted shoe inserts may be helpful. Inserts made of silicone seem to be the most effective. Custom-made inserts can be provided by a podiatrist or foot specialist, physical therapist, or orthopedist.    Premade or custom-made night splints keep the heel stretched out while you sleep. They may prevent morning pain.    Avoid activities that stress the feet: jogging, prolonged standing or walking, contact sports, etc.    First thing in the morning and before sports, stretch the bottom of your foot. Gently flex your ankle so the toes move toward your knee.    Icing may help control heel pain. Apply an ice pack to the heel for 10-20 minutes as a preventive. Or ice your heel after a severe flare-up of symptoms. You may repeat this every 1-2 hours as needed.    You may use over-the-counter pain medicine to control pain, unless another medicine was prescribed. Anti-inflammatory pain medicines, such as ibuprofen or naproxen, may work better than acetaminophen. If you have chronic liver or kidney disease or ever had a stomach ulcer or GI bleeding, talk with your healthcare provider before using these medicines.    Shoe inserts, a night splint, or a special boot may be needed. Use these as directed by your healthcare provider.  Follow-up care   Follow up with your healthcare provider, physical therapist, or podiatrist or foot specialist as advised.  When to seek medical advice  Call your healthcare provider right away if any of these occur:    The pain worsens.    There is no relief after a few weeks of home treatment.   Date Last Reviewed: 11/23/2015 2000-2017 [employer]. [address] [place] [address], [place] [address]. All rights reserved. This  information is not intended as a substitute for professional medical care. Always follow your healthcare professional's instructions.        Understanding Heel Pain    Your heel is the back part of your foot. A band of tissue called the plantar fascia connects the heel bone to the bones in the ball of your foot. Nerves run from the heel up the inside of your ankle and into your leg. When you feel pain in the bottom of your heel, the plantar fascia may be inflamed. Overuse, Achilles tightness, and excess body weight can cause the tissue to tear or pull away from the bone. Sometimes the inflamed plantar fascia also irritates a nerve, causing more pain.  What causes heel pain?  Wearing shoes with poor cushioning can irritate the tissue in your heel (plantar fascia). Being overweight or standing for long periods can also irritate the tissue. Running, walking, tennis, and other sports that put stress on the heels can cause tiny tears in the tissue. If your lower leg muscles are tight, this is more likely to happen. A tight Achilles tendon will also contribute to heel pain.  Symptoms  You may feel pain on the bottom or on the inside edge of your heel. The pain may be sharp when you get out of bed or when you stand up after sitting for a while. You may feel a dull ache in your heel after you ve been standing for a long time on a hard surface. Running can also cause a dull ache.  Preventing future problems  To prevent future heel pain, wear shoes with well-cushioned heels. And do exercises prescribed by your healthcare provider to stretch the plantar fascia and the muscles in the lower leg.   Date Last Reviewed: 10/1/2017    4964-3622 [employer]. [address] [place] [address]. All rights reserved. This information is not intended as a substitute for professional medical care. Always follow your healthcare professional's instructions.        Treating Plantar Fasciitis  First, your healthcare  provider tries to find out the cause of your problem. He or she can then suggest ways to ease pain. If your pain is due to poor foot mechanics, occasionally custom-made shoe inserts (orthoses) may help.    Ease symptoms    To relieve mild symptoms, try aspirin, ibuprofen, or other medicines as directed. Rubbing ice on the area may also help.    To lessen severe pain and swelling, your healthcare provider may give you pills or injections. In some cases, you may need a walking cast. Physical therapy, such as ultrasound or a daily stretching program, may also be recommended. Surgery is rarely needed.    To ease symptoms caused by poor foot mechanics, your foot may be taped. This supports the arch and temporarily controls movement. Night splints may also help by stretching the fascia.  Control movement  If taping helps, your healthcare provider may prescribe orthoses. These are inserts built from plaster casts of your feet. They control the way your foot moves. As a result, your symptoms may go away.  Reduce overuse  Every time your foot strikes the ground, the plantar fascia is stretched. You can lessen the strain on the plantar fascia and the chance of overuse by:    Losing any excess weight    Not running on hard or uneven ground    Using orthoses, if recommended, in your shoes and house slippers  If surgery is needed  Your healthcare provider may consider surgery if other types of treatment don't control your pain. During surgery, the plantar fascia is partially cut to release tension. As you heal, fibrous tissue fills the space between the heel bone and the plantar fascia.   Date Last Reviewed: 1/1/2018 2000-2017 [employer]. [address] [place] [address], [place] [address]. All rights reserved. This information is not intended as a substitute for professional medical care. Always follow your healthcare professional's instructions.                Follow-ups after your visit        Follow-up notes from  your care team     Return if symptoms worsen or fail to improve.      Who to contact     Normal or non-critical lab and imaging results will be communicated to you by citibuddieshart, letter or phone within 4 business days after the clinic has received the results. If you do not hear from us within 7 days, please contact the clinic through citibuddieshart or phone. If you have a critical or abnormal lab result, we will notify you by phone as soon as possible.  Submit refill requests through [employer] or call your pharmacy and they will forward the refill request to us. Please allow 3 business days for your refill to be completed.          If you need to speak with a  for additional information , please call: [phone]             Additional Information About Your Visit        [employer] Information     [employer] gives you secure access to your electronic health record. If you see a primary care provider, you can also send messages to your care team and make appointments. If you have questions, please call your primary care clinic.  If you do not have a primary care provider, please call [phone] and they will assist you.        Care EveryWhere ID     This is your Care EveryWhere ID. This could be used by other organizations to access your [place] medical records  [id number]        Your Vitals Were     Pulse Temperature Respirations Pulse Oximetry BMI (Body Mass Index)       116 98.6  F (37  C) (Oral) 20 98% 41.38 kg/m2        Blood Pressure from Last 3 Encounters:   08/10/18 125/78   06/29/18 110/76   04/12/18 122/82    Weight from Last 3 Encounters:   08/10/18 264 lb 3.2 oz (119.8 kg)   06/29/18 270 lb 7 oz (122.7 kg)   04/12/18 282 lb 12.8 oz (128.3 kg)              We Performed the Following     **TSH with free T4 reflex FUTURE anytime     Lipid panel reflex to direct LDL Fasting     T4, free        Primary Care Provider Office Phone # Fax #    Antonio Lance PA-C [phone] [id number] [facility] [id number]        Equal Access to Services     SARAVANAN HYDE : Hadii andre puri hadmunapiper Sotonyali, wajulietada luqglenisha, qaybta kaarafiona mejia, diana hammersugeythalia castaneda. So [facility] [phone].    ATENCIÓN: Si habla español, tiene a velasquez disposición servicios gratuitos de asistencia lingüística. [facility] [phone].    We comply with applicable federal civil rights laws and [place] laws. We do not discriminate on the basis of race, color, national origin, age, disability, sex, sexual orientation, or gender identity.            Thank you!     Thank you for choosing [facility]  for your care. Our goal is always to provide you with excellent care. Hearing back from our patients is one way we can continue to improve our services. Please take a few minutes to complete the written survey that you may receive in the mail after your visit with us. Thank you!             Your Updated Medication List - Protect others around you: Learn how to safely use, store and throw away your medicines at www.disposemymeds.org.          This list is accurate as of 8/10/18  2:19 PM.  Always use your most recent med list.                   Brand Name Dispense Instructions for use Diagnosis    insulin pen needle 31G X 6 MM     100 each    Use once daily or as directed.    Insulin resistance       levothyroxine 112 MCG tablet    SYNTHROID/LEVOTHROID    180 tablet    Take 1 tablet (112 mcg) by mouth daily    Hypothyroidism due to Hashimoto's thyroiditis       liraglutide 18 MG/3ML soln    VICTOZA    6 mL    Inject 1.2 mg Subcutaneous daily    Insulin resistance       lisinopril-hydrochlorothiazide 20-12.5 MG per tablet    PRINZIDE/ZESTORETIC    90 tablet    TAKE ONE TABLET BY MOUTH EVERY DAY (NEED TO BE SEEN IN CLINIC FOR FURTHER REFILLS)    Benign essential hypertension       metFORMIN 500 MG 24 hr tablet    GLUCOPHAGE-XR    90 tablet    TAKE ONE TABLET BY MOUTH EVERY DAY WITH DINNER    Prediabetes, PCOS  (polycystic ovarian syndrome)       Multi-vitamin Tabs tablet      Take 1 tablet by mouth daily        phentermine 37.5 MG capsule     60 capsule    Take 1 capsule (37.5 mg) by mouth every morning    Obesity, unspecified obesity severity, unspecified obesity type       topiramate 25 MG tablet    TOPAMAX    180 tablet    TAKE ONE TABLET BY MOUTH TWICE A DAY    Nonintractable episodic headache, unspecified headache type, Obesity, unspecified obesity severity, unspecified obesity type

## 2018-08-10 NOTE — NURSING NOTE
"Chief Complaint   Patient presents with     Pain       Initial /78  Pulse 116  Temp 98.6  F (37  C) (Oral)  Resp 20  Wt 264 lb 3.2 oz (119.8 kg)  SpO2 98%  BMI 41.38 kg/m2 Estimated body mass index is 41.38 kg/(m^2) as calculated from the following:    Height as of 6/29/18: 5' 7\" (1.702 m).    Weight as of this encounter: 264 lb 3.2 oz (119.8 kg).  Medication Reconciliation: complete     Michelle Horton MA  "

## 2018-08-10 NOTE — PATIENT INSTRUCTIONS
Well supported shoes at all times with Dr. Nava shoe inserts. Flexeril if needed at night. If pain persists or worsens,  Let me know and I will refer you to podiatry.  Follow below tips as well.      Heel Spurs    The plantar fascia is a thick, fibrous layer of tissue that covers the bones on the bottom of your foot. It holds the foot bones in an arched position. Plantar fasciitis is a painful swelling of the plantar fascia.  A heel spur is an overgrowth of bone where the plantar fascia attaches to the heel bone. The heel spur itself usually doesn t cause pain. However, the heel spur might be a sign of plantar fasciitis which may cause your foot pain. There is no specific treatment for heel spurs.   Plantar fasciitis can develop slowly or suddenly. It usually affects one foot at a time. Heel pain can feel sharp, like a knife sticking into the bottom of your foot. You may feel pain after exercising, long-distance jogging, stair climbing, long periods of standing, or after standing up.  Risk factors for plantar fasciitis include: arthritis, diabetes, obesity or recent weight gain, flat foot, and having high arches. Wearing high heels, loose shoes, or shoes with poor arch support adds to the risk.    Foot pain is usually worse in the morning. But it improves with walking. By the end of the day there may be a dull aching. Treatment includes short-term rest and controlling inflammation. It may take up to 9 months before all symptoms go away. In rare cases, a steroid injection in the foot, or surgery, may be needed.  Home care    If you are overweight, lose weight to help healing.    Choose supportive shoes with good arch support and shock absorbency. Replace athletic shoes when they become worn out. Don t walk or run barefoot.    Premade or custom-fitted shoe inserts may be helpful. Inserts made of silicone seem to be the most effective. Custom-made inserts can be provided by a podiatrist or foot specialist, physical  therapist, or orthopedist.    Premade or custom-made night splints keep the heel stretched out while you sleep. They may prevent morning pain.    Avoid activities that stress the feet: jogging, prolonged standing or walking, contact sports, etc.    First thing in the morning and before sports, stretch the bottom of your foot. Gently flex your ankle so the toes move toward your knee.    Icing may help control heel pain. Apply an ice pack to the heel for 10-20 minutes as a preventive. Or ice your heel after a severe flare-up of symptoms. You may repeat this every 1-2 hours as needed.    You may use over-the-counter pain medicine to control pain, unless another medicine was prescribed. Anti-inflammatory pain medicines, such as ibuprofen or naproxen, may work better than acetaminophen. If you have chronic liver or kidney disease or ever had a stomach ulcer or GI bleeding, talk with your healthcare provider before using these medicines.    Shoe inserts, a night splint, or a special boot may be needed. Use these as directed by your healthcare provider.  Follow-up care   Follow up with your healthcare provider, physical therapist, or podiatrist or foot specialist as advised.  When to seek medical advice  Call your healthcare provider right away if any of these occur:    The pain worsens.    There is no relief after a few weeks of home treatment.   Date Last Reviewed: 11/23/2015 2000-2017 [employer] [address] [place] [address]. All rights reserved. This information is not intended as a substitute for professional medical care. Always follow your healthcare professional's instructions.        Understanding Heel Pain    Your heel is the back part of your foot. A band of tissue called the plantar fascia connects the heel bone to the bones in the ball of your foot. Nerves run from the heel up the inside of your ankle and into your leg. When you feel pain in the bottom of your heel, the plantar  fascia may be inflamed. Overuse, Achilles tightness, and excess body weight can cause the tissue to tear or pull away from the bone. Sometimes the inflamed plantar fascia also irritates a nerve, causing more pain.  What causes heel pain?  Wearing shoes with poor cushioning can irritate the tissue in your heel (plantar fascia). Being overweight or standing for long periods can also irritate the tissue. Running, walking, tennis, and other sports that put stress on the heels can cause tiny tears in the tissue. If your lower leg muscles are tight, this is more likely to happen. A tight Achilles tendon will also contribute to heel pain.  Symptoms  You may feel pain on the bottom or on the inside edge of your heel. The pain may be sharp when you get out of bed or when you stand up after sitting for a while. You may feel a dull ache in your heel after you ve been standing for a long time on a hard surface. Running can also cause a dull ache.  Preventing future problems  To prevent future heel pain, wear shoes with well-cushioned heels. And do exercises prescribed by your healthcare provider to stretch the plantar fascia and the muscles in the lower leg.   Date Last Reviewed: 10/1/2017    0285-5761 [employer]. [address] [place] [address], [place] [address]. All rights reserved. This information is not intended as a substitute for professional medical care. Always follow your healthcare professional's instructions.        Treating Plantar Fasciitis  First, your healthcare provider tries to find out the cause of your problem. He or she can then suggest ways to ease pain. If your pain is due to poor foot mechanics, occasionally custom-made shoe inserts (orthoses) may help.    Ease symptoms    To relieve mild symptoms, try aspirin, ibuprofen, or other medicines as directed. Rubbing ice on the area may also help.    To lessen severe pain and swelling, your healthcare provider may give you pills or injections. In some  cases, you may need a walking cast. Physical therapy, such as ultrasound or a daily stretching program, may also be recommended. Surgery is rarely needed.    To ease symptoms caused by poor foot mechanics, your foot may be taped. This supports the arch and temporarily controls movement. Night splints may also help by stretching the fascia.  Control movement  If taping helps, your healthcare provider may prescribe orthoses. These are inserts built from plaster casts of your feet. They control the way your foot moves. As a result, your symptoms may go away.  Reduce overuse  Every time your foot strikes the ground, the plantar fascia is stretched. You can lessen the strain on the plantar fascia and the chance of overuse by:    Losing any excess weight    Not running on hard or uneven ground    Using orthoses, if recommended, in your shoes and house slippers  If surgery is needed  Your healthcare provider may consider surgery if other types of treatment don't control your pain. During surgery, the plantar fascia is partially cut to release tension. As you heal, fibrous tissue fills the space between the heel bone and the plantar fascia.   Date Last Reviewed: 1/1/2018 2000-2017 The [facility]. [address] [place] [address], [place] [address]. All rights reserved. This information is not intended as a substitute for professional medical care. Always follow your healthcare professional's instructions.

## 2018-08-28 ENCOUNTER — TELEPHONE (OUTPATIENT)
Dept: FAMILY MEDICINE | Facility: CLINIC | Age: 33
End: 2018-08-28

## 2018-08-28 DIAGNOSIS — E06.3 HYPOTHYROIDISM DUE TO HASHIMOTO'S THYROIDITIS: ICD-10-CM

## 2018-08-28 RX ORDER — LEVOTHYROXINE SODIUM 100 UG/1
100 TABLET ORAL DAILY
Qty: 60 TABLET | Refills: 1 | Status: SHIPPED | OUTPATIENT
Start: 2018-08-28 | End: 2019-01-04

## 2018-08-28 NOTE — TELEPHONE ENCOUNTER
Results viewed by patient in Continuing [facility] Records & Resourceshart. Please advise if changes are to be made for her medication.

## 2018-08-28 NOTE — TELEPHONE ENCOUNTER
Her tsh is still low, meaning her levothyroxine dose it too high. I will route a new rx for a lower dose of levothyroxine to her pharmacy. Her cholesterol numbers looked good. Have patient return to clinic for a lab only recheck of her tsh in 8 weeks.

## 2018-08-28 NOTE — TELEPHONE ENCOUNTER
discuss labs result outcomes  TRINA VENTURA    re: thyroid  levels   THANKS  Any medication changes ???

## 2018-09-04 DIAGNOSIS — E66.9 OBESITY, UNSPECIFIED OBESITY SEVERITY, UNSPECIFIED OBESITY TYPE: ICD-10-CM

## 2018-09-04 NOTE — TELEPHONE ENCOUNTER
Requested Prescriptions   Pending Prescriptions Disp Refills     phentermine 37.5 MG capsule 60 capsule 0     Sig: Take 1 capsule (37.5 mg) by mouth every morning    There is no refill protocol information for this order      Last Written Prescription Date:  6-8-18  Last Fill Quantity: 60,  # refills: 0   Last office visit: 8/10/2018 with prescribing provider:  8-10-18   Future Office Visit:

## 2018-09-07 RX ORDER — PHENTERMINE HYDROCHLORIDE 37.5 MG/1
37.5 CAPSULE ORAL EVERY MORNING
Qty: 60 CAPSULE | Refills: 0 | Status: SHIPPED | OUTPATIENT
Start: 2018-09-07 | End: 2018-10-26

## 2018-10-08 DIAGNOSIS — E88.819 INSULIN RESISTANCE: ICD-10-CM

## 2018-10-08 RX ORDER — LIRAGLUTIDE 6 MG/ML
INJECTION SUBCUTANEOUS
Qty: 6 ML | Refills: 3 | Status: SHIPPED | OUTPATIENT
Start: 2018-10-08 | End: 2018-11-20

## 2018-10-08 NOTE — TELEPHONE ENCOUNTER
"Requested Prescriptions   Pending Prescriptions Disp Refills     VICTOZA PEN 18 MG/3ML soln [Pharmacy Med Name: VICTOZA 18MG/3ML SOPN] 6 mL 3    Last Written Prescription Date: 9 -13-18  Last Fill Quantity: 6,  # refills: 3   Last office visit: 8/10/2018 with prescribing provider:  8-10-18   Future Office Visit:   Sig: INJECT 1.2MG UNDER THE SKIN DAILY    GLP-1 Agonists Protocol Failed    10/8/2018  9:30 AM       Failed - Microalbumin on file in past 12 months    No lab results found.         Passed - Blood pressure less than 140/90 in past 6 months    BP Readings from Last 3 Encounters:   08/10/18 125/78   06/29/18 110/76   04/12/18 122/82                Passed - LDL on file in past 12 months    Recent Labs   Lab Test  08/10/18   1403   LDL  90            Passed - HgbA1C in past 3 or 6 months    If HgbA1C is 8 or greater, it needs to be on file within the past 3 months.  If less than 8, must be on file within the past 6 months.     Recent Labs   Lab Test  06/29/18   1417   A1C  5.6            Passed - Patient is age 18 or older       Passed - No active pregnancy on record       Passed - Normal serum creatinine on file in past 12 months    Recent Labs   Lab Test  06/29/18   1417   CR  0.72            Passed - No positive pregnancy test in past 12 months       Passed - Recent (6 mo) or future (30 days) visit within the authorizing provider's specialty    Patient had office visit in the last 6 months or has a visit in the next 30 days with authorizing provider.  See \"Patient Info\" tab in inbasket, or \"Choose Columns\" in Meds & Orders section of the refill encounter.            "

## 2018-10-08 NOTE — TELEPHONE ENCOUNTER
Patient requesting refill last fill and office visit 06/29/18 please sign if appropriate. Thank you

## 2018-10-25 NOTE — PROGRESS NOTES
SUBJECTIVE:   Yvonne Jordan is a 33 year old female who presents to clinic today for the following health issues:      Following up on: heel pain      Last visit this was discussed: 8/10/18 with MG    Progression of Symptoms:  worsening    Accompanying Signs & Symptoms:right heel, left ball of foot    Taking Medication as prescribed: not taking any medications-using shoe inserts, heel wrap, ice     Would like to try different weight loss medications.  BP found to be low at visit, patient has lost weight this year.     Problem list and histories reviewed & adjusted, as indicated.  Additional history: as documented    Patient Active Problem List   Diagnosis     Hyperlipidemia LDL goal <160     Irregular menses     S/P breast augmentation     NATHAN 3 - cervical intraepithelial neoplasia grade 3     Health Care Home     Obesity, unspecified obesity severity, unspecified obesity type     Hypothyroidism due to Hashimoto's thyroiditis     PCOS (polycystic ovarian syndrome)     High risk HPV infection     Morbid obesity (H)     Fatty liver     Past Surgical History:   Procedure Laterality Date     COLPOSCOPY, BIOPSY, COMBINED  2009     CONIZATION  2005     COSMETIC MAMMOPLASTY AUGMENTATION       MOUTH SURGERY      wisdom teeth extraction       Social History   Substance Use Topics     Smoking status: Never Smoker     Smokeless tobacco: Never Used     Alcohol use Yes      Comment: very rare     Family History   Problem Relation Age of Onset     Arthritis Mother      Thyroid Disease Mother      Hypertension Father      Cancer Maternal Grandmother      Hypertension Maternal Grandmother      Cancer Paternal Grandmother      Non-Hodgkins Lymphoma     Cancer Paternal Grandfather          Current Outpatient Prescriptions   Medication Sig Dispense Refill     insulin pen needle 31G X 6 MM Use once daily or as directed. 100 each 11     levothyroxine (SYNTHROID/LEVOTHROID) 100 MCG tablet Take 1 tablet (100 mcg) by mouth daily 60  tablet 1     lisinopril-hydrochlorothiazide (PRINZIDE/ZESTORETIC) 20-12.5 MG per tablet TAKE ONE TABLET BY MOUTH EVERY DAY (NEED TO BE SEEN IN CLINIC FOR FURTHER REFILLS) 90 tablet 1     metFORMIN (GLUCOPHAGE-XR) 500 MG 24 hr tablet TAKE ONE TABLET BY MOUTH EVERY DAY WITH DINNER 90 tablet 1     multivitamin, therapeutic with minerals (MULTI-VITAMIN) TABS Take 1 tablet by mouth daily       naltrexone-bupropion (CONTRAVE) 8-90 MG per 12 hr tablet Take one tablet by mouth for one week, take 1 tablet twice daily for one week, take 2 tablets in the morning and one tablet in the evening for one week, goal dose at week 4 take 2 tablets by mouth twice daily. 360 tablet 0     topiramate (TOPAMAX) 25 MG tablet TAKE ONE TABLET BY MOUTH TWICE A  tablet 1     VICTOZA PEN 18 MG/3ML soln INJECT 1.2MG UNDER THE SKIN DAILY 6 mL 3     Allergies   Allergen Reactions     Nkda [No Known Drug Allergies]      Recent Labs   Lab Test  08/10/18   1403  06/29/18   1417  12/22/17   1301  11/03/17   1438   07/28/17   1537  07/12/17   1712   09/16/16   0905   07/29/11   0715   A1C   --   5.6   --    --    --   6.1*  6.2*   --    --    --    --    LDL  90   --    --    --    --    --    --    --   81   --   74   HDL  46*   --    --    --    --    --    --    --   42*   --   57   TRIG  162*   --    --    --    --    --    --    --   196*   --   260*   ALT   --   53*  73*  73*   < >  84*  70*   < >   --    --    --    CR   --   0.72   --    --    --   0.63  0.61   < >  0.55   < >   --    GFRESTIMATED   --   >90   --    --    --   >90  Non  GFR Calc    >90  Non  GFR Calc     < >  >90  Non  GFR Calc     < >   --    GFRESTBLACK   --   >90   --    --    --   >90   GFR Calc    >90   GFR Calc     < >  >90   GFR Calc     < >   --    POTASSIUM   --   4.1   --    --    --   4.1  4.0   < >   --    < >   --    TSH  0.01*  <0.01*  3.56  3.51   < >  5.91*   6.88*   --   4.86*   < >  2.77    < > = values in this interval not displayed.      BP Readings from Last 3 Encounters:   10/26/18 (!) 88/67   08/10/18 125/78   06/29/18 110/76    Wt Readings from Last 3 Encounters:   10/26/18 260 lb 12.8 oz (118.3 kg)   08/10/18 264 lb 3.2 oz (119.8 kg)   06/29/18 270 lb 7 oz (122.7 kg)                  Labs reviewed in EPIC    Reviewed and updated as needed this visit by clinical staff  Tobacco  Allergies  Meds  Problems  Med Hx  Surg Hx  Fam Hx  Soc Hx        Reviewed and updated as needed this visit by Provider  Allergies  Meds  Problems         ROS:  Constitutional, HEENT, cardiovascular, pulmonary, GI, , musculoskeletal, neuro, skin, endocrine and psych systems are negative, except as otherwise noted.    OBJECTIVE:     BP (!) 88/67  Pulse 108  Temp 98.4  F (36.9  C) (Oral)  Resp 16  Wt 260 lb 12.8 oz (118.3 kg)  LMP 10/05/2018 (Approximate)  SpO2 98%  Breastfeeding? No  BMI 40.85 kg/m2  Body mass index is 40.85 kg/(m^2).  GENERAL: healthy, alert and no distress  RESP: lungs clear to auscultation - no rales, rhonchi or wheezes  CV: regular rate and rhythm, normal S1 S2, no S3 or S4, no murmur, click or rub, no peripheral edema and peripheral pulses strong  MS: no gross musculoskeletal defects noted, no edema POSITIVE for bilateral foot pain, was found to have bone spur at previous visit.  Due to persistent, worsening symptoms, will refer to podiatry  PSYCH: mentation appears normal, affect normal/bright    Diagnostic Test Results:  See orders    ASSESSMENT/PLAN:         ICD-10-CM    1. Follow-up examination Z09    2. Morbid obesity (H) E66.01 naltrexone-bupropion (CONTRAVE) 8-90 MG per 12 hr tablet   3. Plantar fasciitis M72.2 ORTHO  REFERRAL   4. Calcaneal spur, unspecified laterality M77.30 ORTHO  REFERRAL       See Patient Instructions: Take medication as prescribed, let me know if you have any issues with the medication. Stop  phentermine. Consider stopping blood pressure medication, check BP at home, if starts to raise above  130/80, start taking 1/2 tablet.  Let me know if you have any issues with your blood pressure.  Continue suggestions for bone spurs, try low impact exercise as discussed. Schedule with podiatry. Follow up as needed.      Sophia Gallegos, [place]  [facility] [place]

## 2018-10-26 ENCOUNTER — OFFICE VISIT (OUTPATIENT)
Dept: FAMILY MEDICINE | Facility: CLINIC | Age: 33
End: 2018-10-26
Payer: MEDICAID

## 2018-10-26 VITALS
TEMPERATURE: 98.4 F | SYSTOLIC BLOOD PRESSURE: 88 MMHG | DIASTOLIC BLOOD PRESSURE: 67 MMHG | BODY MASS INDEX: 40.85 KG/M2 | HEART RATE: 108 BPM | WEIGHT: 260.8 LBS | OXYGEN SATURATION: 98 % | RESPIRATION RATE: 16 BRPM

## 2018-10-26 DIAGNOSIS — Z09 FOLLOW-UP EXAMINATION: Primary | ICD-10-CM

## 2018-10-26 DIAGNOSIS — E66.01 MORBID OBESITY (H): ICD-10-CM

## 2018-10-26 DIAGNOSIS — M72.2 PLANTAR FASCIITIS: ICD-10-CM

## 2018-10-26 DIAGNOSIS — M77.30 CALCANEAL SPUR, UNSPECIFIED LATERALITY: ICD-10-CM

## 2018-10-26 PROCEDURE — 99214 OFFICE O/P EST MOD 30 MIN: CPT | Performed by: NURSE PRACTITIONER

## 2018-10-26 NOTE — PATIENT INSTRUCTIONS
Take medication as prescribed, let me know if you have any issues with the medication. Stop phentermine. Consider stopping blood pressure medication, check BP at home, if starts to raise above  130/80, start taking 1/2 tablet.  Let me know if you have any issues with your blood pressure.  Continue suggestions for bone spurs, try low impact exercise as discussed. Schedule with podiatry. Follow up as needed.

## 2018-10-26 NOTE — MR AVS SNAPSHOT
After Visit Summary   10/26/2018    Yvonne Jordan    MRN: [id number]           Patient Information     Date Of Birth          1985        Visit Information        Provider Department      10/26/2018 3:00 PM Sophia Gallegos NP [facility]        Today's Diagnoses     Morbid obesity (H)    -  1    Plantar fasciitis        Calcaneal spur, unspecified laterality          Care Instructions    Take medication as prescribed, let me know if you have any issues with the medication. Stop phentermine. Consider stopping blood pressure medication, check BP at home, if starts to raise above 130/80, start taking 1/2 tablet.  Let me know if you have any issues with your blood pressure.  Continue suggestions for bone spurs, try low impact exercise as discussed. Schedule with podiatry. Follow up as needed.               Follow-ups after your visit        Additional Services     ORTHO  REFERRAL       [facility] is referring you to the Orthopedic  Services at [facility].       The  Representative will assist you in the coordination of your Orthopedic and Musculoskeletal Care as prescribed by your physician.    The  Representative will call you within 1 business day to help schedule your appointment, or you may contact the  Representative at:    All areas ~ [phone]     Type of Referral : Podiatry / Foot & Ankle Surgery       Timeframe requested: 3 - 5 days    Coverage of these services is subject to the terms and limitations of your health insurance plan.  Please call member services at your health plan with any benefit or coverage questions.      If X-rays, CT or MRI's have been performed, please contact the facility where they were done to arrange for , prior to your scheduled appointment.  Please bring this referral request to your appointment and present it to your specialist.                  Follow-up  notes from your care team     Return if symptoms worsen or fail to improve.      Who to contact     Normal or non-critical lab and imaging results will be communicated to you by Foxtrothart, letter or phone within 4 business days after the clinic has received the results. If you do not hear from us within 7 days, please contact the clinic through Foxtrothart or phone. If you have a critical or abnormal lab result, we will notify you by phone as soon as possible.  Submit refill requests through GliaCure or call your pharmacy and they will forward the refill request to us. Please allow 3 business days for your refill to be completed.          If you need to speak with a  for additional information , please call: [phone]             Additional Information About Your Visit        GliaCure Information     GliaCure gives you secure access to your electronic health record. If you see a primary care provider, you can also send messages to your care team and make appointments. If you have questions, please call your primary care clinic.  If you do not have a primary care provider, please call [phone] and they will assist you.        Care EveryWhere ID     This is your Care EveryWhere ID. This could be used by other organizations to access your [facility] medical records  [id number]        Your Vitals Were     Pulse Temperature Respirations Last Period Pulse Oximetry Breastfeeding?    108 98.4  F (36.9  C) (Oral) 16 10/05/2018 (Approximate) 98% No    BMI (Body Mass Index)                   40.85 kg/m2            Blood Pressure from Last 3 Encounters:   10/26/18 (!) 88/67   08/10/18 125/78   06/29/18 110/76    Weight from Last 3 Encounters:   10/26/18 260 lb 12.8 oz (118.3 kg)   08/10/18 264 lb 3.2 oz (119.8 kg)   06/29/18 270 lb 7 oz (122.7 kg)              We Performed the Following     ORTHO  REFERRAL          Today's Medication Changes          These changes are accurate as of 10/26/18  3:07 PM.   If you have any questions, ask your nurse or doctor.               Start taking these medicines.        Dose/Directions    naltrexone-bupropion 8-90 MG per 12 hr tablet   Commonly known as:  CONTRAVE   Used for:  Morbid obesity (H)   Started by:  Sophia Gallegos NP        Take one tablet by mouth for one week, take 1 tablet twice daily for one week, take 2 tablets in the morning and one tablet in the evening for one week, goal dose at week 4 take 2 tablets by mouth twice daily.   Quantity:  360 tablet   Refills:  0         Stop taking these medicines if you haven't already. Please contact your care team if you have questions.     phentermine 37.5 MG capsule   Stopped by:  Sophia Gallegos NP                Where to get your medicines      Some of these will need a paper prescription and others can be bought over the counter.  Ask your nurse if you have questions.     Bring a paper prescription for each of these medications     naltrexone-bupropion 8-90 MG per 12 hr tablet                Primary Care Provider Office Phone # Fax #    Antonio Lance PA-C [phone]       [address]  [place] [address]        Equal Access to Services     [facility]: Hadii andre ku hadasho Soomaali, waaxda luqadaha, qaybta kaalmada adestephyyada, diana benítez . [facility] [phone].    ATENCIÓN: Si habla español, tiene a velasquez disposición servicios gratuitos de asistencia lingüística. [facility] [phone].    We comply with applicable federal civil rights laws and [place] laws. We do not discriminate on the basis of race, color, national origin, age, disability, sex, sexual orientation, or gender identity.            Thank you!     Thank you for choosing [facility]  for your care. Our goal is always to provide you with excellent care. Hearing back from our patients is one way we can continue to improve our services. Please take a few minutes to complete the written survey  that you may receive in the mail after your visit with us. Thank you!             Your Updated Medication List - Protect others around you: Learn how to safely use, store and throw away your medicines at www.disposemymeds.org.          This list is accurate as of 10/26/18  3:07 PM.  Always use your most recent med list.                   Brand Name Dispense Instructions for use Diagnosis    insulin pen needle 31G X 6 MM     100 each    Use once daily or as directed.    Insulin resistance       levothyroxine 100 MCG tablet    SYNTHROID/LEVOTHROID    60 tablet    Take 1 tablet (100 mcg) by mouth daily    Hypothyroidism due to Hashimoto's thyroiditis       lisinopril-hydrochlorothiazide 20-12.5 MG per tablet    PRINZIDE/ZESTORETIC    90 tablet    TAKE ONE TABLET BY MOUTH EVERY DAY (NEED TO BE SEEN IN CLINIC FOR FURTHER REFILLS)    Benign essential hypertension       metFORMIN 500 MG 24 hr tablet    GLUCOPHAGE-XR    90 tablet    TAKE ONE TABLET BY MOUTH EVERY DAY WITH DINNER    Prediabetes, PCOS (polycystic ovarian syndrome)       Multi-vitamin Tabs tablet      Take 1 tablet by mouth daily        naltrexone-bupropion 8-90 MG per 12 hr tablet    CONTRAVE    360 tablet    Take one tablet by mouth for one week, take 1 tablet twice daily for one week, take 2 tablets in the morning and one tablet in the evening for one week, goal dose at week 4 take 2 tablets by mouth twice daily.    Morbid obesity (H)       topiramate 25 MG tablet    TOPAMAX    180 tablet    TAKE ONE TABLET BY MOUTH TWICE A DAY    Nonintractable episodic headache, unspecified headache type, Obesity, unspecified obesity severity, unspecified obesity type       VICTOZA PEN 18 MG/3ML soln   Generic drug:  liraglutide     6 mL    INJECT 1.2MG UNDER THE SKIN DAILY    Insulin resistance

## 2018-10-29 DIAGNOSIS — E66.9 OBESITY, UNSPECIFIED OBESITY SEVERITY, UNSPECIFIED OBESITY TYPE: ICD-10-CM

## 2018-10-29 RX ORDER — PHENTERMINE HYDROCHLORIDE 37.5 MG/1
37.5 CAPSULE ORAL EVERY MORNING
Qty: 60 CAPSULE | Refills: 0 | Status: SHIPPED | OUTPATIENT
Start: 2018-10-29 | End: 2018-11-20

## 2018-10-29 NOTE — TELEPHONE ENCOUNTER
Requested Prescriptions   Pending Prescriptions Disp Refills     phentermine 37.5 MG capsule 60 capsule 0     Sig: Take 1 capsule (37.5 mg) by mouth every morning  Phentermine      Last Written Prescription Date:  9/4/18  Last Fill Quantity: 60,   # refills: 0  Last Office Visit: 10/26/18 El  Future Office visit:       Routing refill request to provider for review/approval because:  Drug not on the [facility] or [facility] refill protocol or controlled substance    There is no refill protocol information for this order

## 2018-10-31 DIAGNOSIS — I10 BENIGN ESSENTIAL HYPERTENSION: ICD-10-CM

## 2018-10-31 RX ORDER — LISINOPRIL AND HYDROCHLOROTHIAZIDE 12.5; 2 MG/1; MG/1
1 TABLET ORAL DAILY
Qty: 90 TABLET | Refills: 1 | Status: SHIPPED | OUTPATIENT
Start: 2018-10-31 | End: 2019-04-29

## 2018-10-31 NOTE — TELEPHONE ENCOUNTER
Prescription approved per [facility] Refill Protocol.  Put information re: BP parameters on medication information for patient to start with a 1/2 tab if BP >130/80.  Sherly Mcrae, RN, BSN

## 2018-10-31 NOTE — TELEPHONE ENCOUNTER
"Requested Prescriptions   Pending Prescriptions Disp Refills     lisinopril-hydrochlorothiazide (PRINZIDE/ZESTORETIC) 20-12.5 MG per tablet [Pharmacy Med Name: LISINOPRIL-HYDROCHLORO 20-12.5 TABS] 90 tablet 1    Last Written Prescription Date:  10/8/18  Last Fill Quantity: 90,  # refills: 1   Last office visit: 10/26/2018 with prescribing provider:  10/26/18 El   Future Office Visit:   Sig: TAKE ONE TABLET BY MOUTH EVERY DAY    Diuretics (Including Combos) Protocol Passed    10/31/2018 10:33 AM       Passed - Blood pressure under 140/90 in past 12 months    BP Readings from Last 3 Encounters:   10/26/18 (!) 88/67   08/10/18 125/78   06/29/18 110/76                Passed - Recent (12 mo) or future (30 days) visit within the authorizing provider's specialty    Patient had office visit in the last 12 months or has a visit in the next 30 days with authorizing provider or within the authorizing provider's specialty.  See \"Patient Info\" tab in inbasket, or \"Choose Columns\" in Meds & Orders section of the refill encounter.             Passed - Patient is age 18 or older       Passed - No active pregancy on record       Passed - Normal serum creatinine on file in past 12 months    Recent Labs   Lab Test  06/29/18   1417   CR  0.72             Passed - Normal serum potassium on file in past 12 months    Recent Labs   Lab Test  06/29/18   1417   POTASSIUM  4.1                   Passed - Normal serum sodium on file in past 12 months    Recent Labs   Lab Test  06/29/18   1417   NA  139             Passed - No positive pregnancy test in past 12 months        "

## 2018-10-31 NOTE — TELEPHONE ENCOUNTER
OV note with Marlene on 10/26/18:  Consider stopping blood pressure medication, check BP at home, if starts to raise above  130/80, start taking 1/2 tablet.  Let me know if you have any issues with your blood pressure.      Called patient to inquire re: blood pressures and if she stopped BP meds. No answer. Left message on voice mail for patient to call clinic. [phone]      Sherly Mcrae, RN, BSN

## 2018-11-15 ENCOUNTER — OFFICE VISIT (OUTPATIENT)
Dept: PODIATRY | Facility: CLINIC | Age: 33
End: 2018-11-15
Payer: MEDICAID

## 2018-11-15 VITALS
DIASTOLIC BLOOD PRESSURE: 65 MMHG | HEART RATE: 100 BPM | SYSTOLIC BLOOD PRESSURE: 122 MMHG | WEIGHT: 259 LBS | BODY MASS INDEX: 40.57 KG/M2

## 2018-11-15 DIAGNOSIS — M72.2 PLANTAR FASCIITIS: Primary | ICD-10-CM

## 2018-11-15 DIAGNOSIS — M77.8 CAPSULITIS OF LEFT FOOT: ICD-10-CM

## 2018-11-15 PROCEDURE — 99243 OFF/OP CNSLTJ NEW/EST LOW 30: CPT | Performed by: PODIATRIST

## 2018-11-15 NOTE — PROGRESS NOTES
Subjective:    Patient is seen today in consult from Sophia Gallegos with a 5 month(s) hx of right heel pain.  Points to the plantarmedial calcaneal tubercle.  Most painful upon rising in a.m. or after prolonged sitting.  Aggravated by activity and relieved by rest.  Has tried changing shoewear, OTC inserts, night splint, icing, stretching without much success.  Denies erythema, edema, ecchymosis, numbness, loss of strength.  Mostly sitting at work.  Wears slippers around the house.  For the last 1 month she started getting left forefoot pain.  Points to the left sub-second metatarsal head.  Aggravated by activity and relieved by rest.  The patient has been exercising before this happened.  Was doing a treadmill sometimes at an incline.      ROS:  A 10-point review of systems was performed and is positive for that noted in the HPI and as seen below.  All other areas are negative.        Allergies   Allergen Reactions     Nkda [No Known Drug Allergies]        Current Outpatient Prescriptions   Medication Sig Dispense Refill     insulin pen needle 31G X 6 MM Use once daily or as directed. 100 each 11     levothyroxine (SYNTHROID/LEVOTHROID) 100 MCG tablet Take 1 tablet (100 mcg) by mouth daily 60 tablet 1     lisinopril-hydrochlorothiazide (PRINZIDE/ZESTORETIC) 20-12.5 MG per tablet Take 1 tablet by mouth daily If blood pressure above 130/80, start taking 1/2 tablet. Please let clinic know how your BP is doing. 90 tablet 1     metFORMIN (GLUCOPHAGE-XR) 500 MG 24 hr tablet TAKE ONE TABLET BY MOUTH EVERY DAY WITH DINNER 90 tablet 1     multivitamin, therapeutic with minerals (MULTI-VITAMIN) TABS Take 1 tablet by mouth daily       phentermine 37.5 MG capsule Take 1 capsule (37.5 mg) by mouth every morning 60 capsule 0     topiramate (TOPAMAX) 25 MG tablet TAKE ONE TABLET BY MOUTH TWICE A  tablet 1     VICTOZA PEN 18 MG/3ML soln INJECT 1.2MG UNDER THE SKIN DAILY 6 mL 3       Patient Active Problem List   Diagnosis      Hyperlipidemia LDL goal <160     Irregular menses     S/P breast augmentation     NATHAN 3 - cervical intraepithelial neoplasia grade 3     Health Care Home     Obesity, unspecified obesity severity, unspecified obesity type     Hypothyroidism due to Hashimoto's thyroiditis     PCOS (polycystic ovarian syndrome)     High risk HPV infection     Morbid obesity (H)     Fatty liver       Past Medical History:   Diagnosis Date     Abnormal Pap smear      Cervical high risk HPV (human papillomavirus) test positive 8/2015, 11/25/16, 12/22/17    Neg 16/18     Hx of colposcopy with cervical biopsy 2/2/17    Bx & ECC - negative     Menstrual migraine      S/P cone biopsy of cervix 2/2006    HSIL NATHAN 2-3     Thyroid disease        Past Surgical History:   Procedure Laterality Date     COLPOSCOPY, BIOPSY, COMBINED  2009     CONIZATION  2005     COSMETIC MAMMOPLASTY AUGMENTATION       MOUTH SURGERY      wisdom teeth extraction       Family History   Problem Relation Age of Onset     Arthritis Mother      Thyroid Disease Mother      Hypertension Father      Cancer Maternal Grandmother      Hypertension Maternal Grandmother      Cancer Paternal Grandmother      Non-Hodgkins Lymphoma     Cancer Paternal Grandfather        Social History   Substance Use Topics     Smoking status: Never Smoker     Smokeless tobacco: Never Used     Alcohol use Yes      Comment: very rare         Objective:    Vitals: /65  Pulse 100  Wt 259 lb (117.5 kg)  BMI 40.57 kg/m2  BMI: Body mass index is 40.57 kg/(m^2).  Height: Data Unavailable    Constitutional/ general:  Pt is in no apparent distress, appears well-nourished.  Cooperative with history and physical exam.     Psych:  The patient answered questions appropriately.  Normal affect.  Seems to have reasonable expectations, in terms of treatment.     Eyes:  Visual scanning/ tracking without deficit.     Ears:  Response to auditory stimuli is normal.  No hearing aid devices.  Auricles in  proper alignment.     Lymphatic:  Popliteal lymph nodes not enlarged.     Lungs:  Non labored breathing, non labored speech. No cough.  No audible wheezing. Even, quiet breathing.       Vascular:  Pedal pulses are palpable bilaterally for both the DP and PT arteries.  CFT < 3 sec.  No edema.  Pedal hair growth noted.     Neuro:  Alert and oriented x 3. Coordinated gait.  Light touch sensation is intact to the L4, L5, S1 distributions. No obvious deficits.  No evidence of neurological-based weakness, spasticity, or contracture in the lower extremities.     Derm: Normal texture and turgor.  No erythema, ecchymosis, or cyanosis.  No open lesions.     Musculoskeletal:    Lower extremity muscle strength is normal.  Patient is ambulatory without an assistive device or brace.  No gross deformities.      Normal arch with weightbearing.   ROM is within normal limits.  Negative tinel's sign.  No side to side compression pain of the calcaneus.  Pain upon palpation to the right plantarmedial  of the calcaneus.  No erythema, edema, ecchymosis, or subcutaneous masses noted.  No pain on palpation or stressing any tendons.  Negative Tinel's sign.  Left foot pain sub-second met head.  Negative Lockman's test.  Negative Lopez's click.  No erythema edema or masses noted    Radiographic Exam:  X-Ray Findings:  I personally reviewed the films.  Normal    Wearing very poor shoes today    Assessment:  Plantar Fasciitis right foot                           Left subsecond capsulitis    Plan:  X-rays personally reviewed.  Discussed etiology and treatment options with the patient.  The potential causes and nature of plantar fasciitis were discussed with the patient.  We reviewed the natural history/prognosis of the condition and risks if left untreated.  These include chronic pain, other sites of pain due to gait changes, and potential plantar fascial rupture.      We discussed possible causes of the condition as it relates to the patients  specific situation.      Conservative treatment options were reviewed:  appropriate shoes, avoidance of barefoot walking, inserts/orthoses, stretching, ice, massage, immobilization and NSAIDs.     We also reviewed the options of injection therapy and surgery.  However, it was made clear that surgery is only considered when conservative therapy fails.  The risks and benefits of injection therapy, and surgery were discussed.  Dispensed handout.       After thorough discussion and answering all questions, the patient elected to modifying activities, supportive shoes, ice, stretching, and not going barefoot.  Good house shoes at all times and I made recommendations.  Patient will get better work shoes.  Discussed which activities will bother these problems.  She will stop doing the treadmill and only do low impact activities and I made  suggestion suggestions.  RTC as needed.  Thank you for allowing me to participate in the care of this patient    Luis Antonio Woodruff DPM, FACFAS

## 2018-11-15 NOTE — MR AVS SNAPSHOT
After Visit Summary   11/15/2018    Yvonne Jordan    MRN: [id number]           Patient Information     Date Of Birth          1985        Visit Information        Provider Department      11/15/2018 5:00 PM Luis Antonio Woodruff, DPLUIS [facility]        Today's Diagnoses     Plantar fasciitis    -  1    Capsulitis of left foot          Care Instructions    We wish you continued good healing. If you have any questions or concerns, please do not hesitate to contact us at [phone]    Please remember to call and schedule a follow up appointment if one was recommended at your earliest convenience.   PODIATRY CLINIC HOURS  TELEPHONE NUMBER    Dr. Luis Antonio PACKERPTHOM FAC FAS    Clinics:  [facility]    [facility]   Tuesday 1PM-6PM  [place]  Wednesday 7AM-2PM  [place]  Thursday 10AM-6PM  [place]  Friday 7AM-3PM  [place]  Specialty schedulers:   [phone] to make an appointment with any Specialty Provider.        Urgent Care locations:    [facility] Monday-Friday 5 pm - 9 pm. Saturday-Sunday 9 am -5pm    Monday-Friday 11 am - 9 pm Saturday 9 am - 5 pm     Monday-Sunday 12 noon-8PM [phone]     If you need a medication refill, please contact us you may need lab work and/or a follow up visit prior to your refill (i.e. Antifungal medications).    [employer] (secure e-mail communication and access to your chart) to send a message or to make an appointment.    If MRI needed please call [facility] at [phone]          Weight management plan: Patient was referred to their PCP to discuss a diet and exercise plan.            Follow-ups after your visit        Your next 10 appointments already scheduled     Nov 29, 2018  4:00 PM CST   Office Visit with Antonio Lance PA-C   [facility] ([facility] [place])     [address]  [place] [id number] [phone]           Bring a current list of meds and any records pertaining to this visit. For Physicals, please bring immunization records and any forms needing to be filled out. Please arrive 10 minutes early to complete paperwork.              Who to contact     If you have questions or need follow up information about today's clinic visit or your schedule please contact [facility] directly at [phone].  Normal or non-critical lab and imaging results will be communicated to you by 20:20 Mobilehart, letter or phone within 4 business days after the clinic has received the results. If you do not hear from us within 7 days, please contact the clinic through [employer] or phone. If you have a critical or abnormal lab result, we will notify you by phone as soon as possible.  Submit refill requests through [employer] or call your pharmacy and they will forward the refill request to us. Please allow 3 business days for your refill to be completed.          Additional Information About Your Visit        20:20 Mobilehart Information     [employer] gives you secure access to your electronic health record. If you see a primary care provider, you can also send messages to your care team and make appointments. If you have questions, please call your primary care clinic.  If you do not have a primary care provider, please call [phone] and they will assist you.        Care EveryWhere ID     This is your Care EveryWhere ID. This could be used by other organizations to access your [place] medical records  [id number]        Your Vitals Were     Pulse BMI (Body Mass Index)                100 40.57 kg/m2           Blood Pressure from Last 3 Encounters:   11/15/18 122/65   10/26/18 (!) 88/67   08/10/18 125/78    Weight from Last 3 Encounters:   11/15/18 259 lb (117.5 kg)   10/26/18 260 lb 12.8 oz (118.3 kg)   08/10/18 264 lb 3.2 oz (119.8 kg)              Today, you had the  following     No orders found for display         Today's Medication Changes          These changes are accurate as of 11/15/18  5:16 PM.  If you have any questions, ask your nurse or doctor.               Stop taking these medicines if you haven't already. Please contact your care team if you have questions.     naltrexone-bupropion 8-90 MG per 12 hr tablet   Commonly known as:  CONTRAVE   Stopped by:  Luis Antonio Woodruff DPM                    Primary Care Provider Office Phone # Fax #    Antonio Lance PA-C [phone]       [address]  [place] [address]        Equal Access to Services     [facility]: Hadii aad ku hadasho Soomaali, waaxda luqadaha, qaybta kaalmada adeegyada, waxay idiin hayaan adeeg kharathalia benítez . So [facility] [phone].    ATENCIÓN: Si habla español, tiene a velasquez disposición servicios gratuitos de asistencia lingüística. [facility] [phone].    We comply with applicable federal civil rights laws and [place] laws. We do not discriminate on the basis of race, color, national origin, age, disability, sex, sexual orientation, or gender identity.            Thank you!     Thank you for choosing [facility]  for your care. Our goal is always to provide you with excellent care. Hearing back from our patients is one way we can continue to improve our services. Please take a few minutes to complete the written survey that you may receive in the mail after your visit with us. Thank you!             Your Updated Medication List - Protect others around you: Learn how to safely use, store and throw away your medicines at www.disposemymeds.org.          This list is accurate as of 11/15/18  5:16 PM.  Always use your most recent med list.                   Brand Name Dispense Instructions for use Diagnosis    insulin pen needle 31G X 6 MM     100 each    Use once daily or as directed.    Insulin resistance       levothyroxine 100 MCG tablet    SYNTHROID/LEVOTHROID     60 tablet    Take 1 tablet (100 mcg) by mouth daily    Hypothyroidism due to Hashimoto's thyroiditis       lisinopril-hydrochlorothiazide 20-12.5 MG per tablet    PRINZIDE/ZESTORETIC    90 tablet    Take 1 tablet by mouth daily If blood pressure above 130/80, start taking 1/2 tablet. Please let clinic know how your BP is doing.    Benign essential hypertension       metFORMIN 500 MG 24 hr tablet    GLUCOPHAGE-XR    90 tablet    TAKE ONE TABLET BY MOUTH EVERY DAY WITH DINNER    Prediabetes, PCOS (polycystic ovarian syndrome)       Multi-vitamin Tabs tablet      Take 1 tablet by mouth daily        phentermine 37.5 MG capsule     60 capsule    Take 1 capsule (37.5 mg) by mouth every morning    Obesity, unspecified obesity severity, unspecified obesity type       topiramate 25 MG tablet    TOPAMAX    180 tablet    TAKE ONE TABLET BY MOUTH TWICE A DAY    Nonintractable episodic headache, unspecified headache type, Obesity, unspecified obesity severity, unspecified obesity type       VICTOZA PEN 18 MG/3ML soln   Generic drug:  liraglutide     6 mL    INJECT 1.2MG UNDER THE SKIN DAILY    Insulin resistance

## 2018-11-15 NOTE — PATIENT INSTRUCTIONS
We wish you continued good healing. If you have any questions or concerns, please do not hesitate to contact us at [phone]    Please remember to call and schedule a follow up appointment if one was recommended at your earliest convenience.   PODIATRY CLINIC HOURS  TELEPHONE NUMBER    Dr. Luis Antonio Woodruff D.P.M [facility]    Clinics:  [facility]    [facility]   Tuesday 1PM-6PM  [place]  Wednesday 7AM-2PM  [facility]  Thursday 10AM-6PM  [place]  Friday 7AM-3PM  [place]  Specialty schedulers:   [phone] to make an appointment with any Specialty Provider.        Urgent Care locations:    [facility] Monday-Friday 5 pm - 9 pm. Saturday-Sunday 9 am -5pm    Monday-Friday 11 am - 9 pm Saturday 9 am - 5 pm     Monday-Sunday 12 noon-8PM [phone]     If you need a medication refill, please contact us you may need lab work and/or a follow up visit prior to your refill (i.e. Antifungal medications).    [employer] (secure e-mail communication and access to your chart) to send a message or to make an appointment.    If MRI needed please call [facility] at [phone]          Weight management plan: Patient was referred to their PCP to discuss a diet and exercise plan.

## 2018-11-15 NOTE — LETTER
11/15/2018         RE: Yvonne Jordan  [address] [place] [address]        Dear Colleague,    Thank you for referring your patient, Yvonne Jordan, to the [facility]. Please see a copy of my visit note below.    Subjective:    Patient is seen today in consult from Sophia Gallegos with a 5 month(s) hx of right heel pain.  Points to the plantarmedial calcaneal tubercle.  Most painful upon rising in a.m. or after prolonged sitting.  Aggravated by activity and relieved by rest.  Has tried changing shoewear, OTC inserts, night splint, icing, stretching without much success.  Denies erythema, edema, ecchymosis, numbness, loss of strength.  Mostly sitting at work.  Wears slippers around the house.  For the last 1 month she started getting left forefoot pain.  Points to the left sub-second metatarsal head.  Aggravated by activity and relieved by rest.  The patient has been exercising before this happened.  Was doing a treadmill sometimes at an incline.      ROS:  A 10-point review of systems was performed and is positive for that noted in the HPI and as seen below.  All other areas are negative.        Allergies   Allergen Reactions     Nkda [No Known Drug Allergies]        Current Outpatient Prescriptions   Medication Sig Dispense Refill     insulin pen needle 31G X 6 MM Use once daily or as directed. 100 each 11     levothyroxine (SYNTHROID/LEVOTHROID) 100 MCG tablet Take 1 tablet (100 mcg) by mouth daily 60 tablet 1     lisinopril-hydrochlorothiazide (PRINZIDE/ZESTORETIC) 20-12.5 MG per tablet Take 1 tablet by mouth daily If blood pressure above 130/80, start taking 1/2 tablet. Please let clinic know how your BP is doing. 90 tablet 1     metFORMIN (GLUCOPHAGE-XR) 500 MG 24 hr tablet TAKE ONE TABLET BY MOUTH EVERY DAY WITH DINNER 90 tablet 1     multivitamin, therapeutic with minerals (MULTI-VITAMIN) TABS Take 1 tablet by mouth daily       phentermine 37.5 MG capsule Take 1 capsule (37.5 mg) by mouth  every morning 60 capsule 0     topiramate (TOPAMAX) 25 MG tablet TAKE ONE TABLET BY MOUTH TWICE A  tablet 1     VICTOZA PEN 18 MG/3ML soln INJECT 1.2MG UNDER THE SKIN DAILY 6 mL 3       Patient Active Problem List   Diagnosis     Hyperlipidemia LDL goal <160     Irregular menses     S/P breast augmentation     NATHAN 3 - cervical intraepithelial neoplasia grade 3     Health Care Home     Obesity, unspecified obesity severity, unspecified obesity type     Hypothyroidism due to Hashimoto's thyroiditis     PCOS (polycystic ovarian syndrome)     High risk HPV infection     Morbid obesity (H)     Fatty liver       Past Medical History:   Diagnosis Date     Abnormal Pap smear      Cervical high risk HPV (human papillomavirus) test positive 8/2015, 11/25/16, 12/22/17    Neg 16/18     Hx of colposcopy with cervical biopsy 2/2/17    Bx & ECC - negative     Menstrual migraine      S/P cone biopsy of cervix 2/2006    HSIL NATHAN 2-3     Thyroid disease        Past Surgical History:   Procedure Laterality Date     COLPOSCOPY, BIOPSY, COMBINED  2009     CONIZATION  2005     COSMETIC MAMMOPLASTY AUGMENTATION       MOUTH SURGERY      wisdom teeth extraction       Family History   Problem Relation Age of Onset     Arthritis Mother      Thyroid Disease Mother      Hypertension Father      Cancer Maternal Grandmother      Hypertension Maternal Grandmother      Cancer Paternal Grandmother      Non-Hodgkins Lymphoma     Cancer Paternal Grandfather        Social History   Substance Use Topics     Smoking status: Never Smoker     Smokeless tobacco: Never Used     Alcohol use Yes      Comment: very rare         Objective:    Vitals: /65  Pulse 100  Wt 259 lb (117.5 kg)  BMI 40.57 kg/m2  BMI: Body mass index is 40.57 kg/(m^2).  Height: Data Unavailable    Constitutional/ general:  Pt is in no apparent distress, appears well-nourished.  Cooperative with history and physical exam.     Psych:  The patient answered questions  appropriately.  Normal affect.  Seems to have reasonable expectations, in terms of treatment.     Eyes:  Visual scanning/ tracking without deficit.     Ears:  Response to auditory stimuli is normal.  No hearing aid devices.  Auricles in proper alignment.     Lymphatic:  Popliteal lymph nodes not enlarged.     Lungs:  Non labored breathing, non labored speech. No cough.  No audible wheezing. Even, quiet breathing.       Vascular:  Pedal pulses are palpable bilaterally for both the DP and PT arteries.  CFT < 3 sec.  No edema.  Pedal hair growth noted.     Neuro:  Alert and oriented x 3. Coordinated gait.  Light touch sensation is intact to the L4, L5, S1 distributions. No obvious deficits.  No evidence of neurological-based weakness, spasticity, or contracture in the lower extremities.     Derm: Normal texture and turgor.  No erythema, ecchymosis, or cyanosis.  No open lesions.     Musculoskeletal:    Lower extremity muscle strength is normal.  Patient is ambulatory without an assistive device or brace.  No gross deformities.      Normal arch with weightbearing.   ROM is within normal limits.  Negative tinel's sign.  No side to side compression pain of the calcaneus.  Pain upon palpation to the right plantarmedial  of the calcaneus.  No erythema, edema, ecchymosis, or subcutaneous masses noted.  No pain on palpation or stressing any tendons.  Negative Tinel's sign.  Left foot pain sub-second met head.  Negative Lockman's test.  Negative Lopez's click.  No erythema edema or masses noted    Radiographic Exam:  X-Ray Findings:  I personally reviewed the films.  Normal    Wearing very poor shoes today    Assessment:  Plantar Fasciitis right foot                           Left subsecond capsulitis    Plan:  X-rays personally reviewed.  Discussed etiology and treatment options with the patient.  The potential causes and nature of plantar fasciitis were discussed with the patient.  We reviewed the natural history/prognosis  of the condition and risks if left untreated.  These include chronic pain, other sites of pain due to gait changes, and potential plantar fascial rupture.      We discussed possible causes of the condition as it relates to the patients specific situation.      Conservative treatment options were reviewed:  appropriate shoes, avoidance of barefoot walking, inserts/orthoses, stretching, ice, massage, immobilization and NSAIDs.     We also reviewed the options of injection therapy and surgery.  However, it was made clear that surgery is only considered when conservative therapy fails.  The risks and benefits of injection therapy, and surgery were discussed.  Dispensed handout.       After thorough discussion and answering all questions, the patient elected to modifying activities, supportive shoes, ice, stretching, and not going barefoot.  Good house shoes at all times and I made recommendations.  Patient will get better work shoes.  Discussed which activities will bother these problems.  She will stop doing the treadmill and only do low impact activities and I made  suggestion suggestions.  RTC as needed.  Thank you for allowing me to participate in the care of this patient    Luis Antonio Woodruff DPM, FACFAS      Again, thank you for allowing me to participate in the care of your patient.        Sincerely,        Luis Antonio Woodruff DPM

## 2018-11-29 ENCOUNTER — OFFICE VISIT (OUTPATIENT)
Dept: FAMILY MEDICINE | Facility: CLINIC | Age: 33
End: 2018-11-29
Payer: MEDICAID

## 2018-11-29 VITALS
RESPIRATION RATE: 18 BRPM | HEIGHT: 67 IN | DIASTOLIC BLOOD PRESSURE: 68 MMHG | OXYGEN SATURATION: 95 % | WEIGHT: 254 LBS | BODY MASS INDEX: 39.87 KG/M2 | SYSTOLIC BLOOD PRESSURE: 117 MMHG | HEART RATE: 117 BPM | TEMPERATURE: 98.8 F

## 2018-11-29 DIAGNOSIS — Z23 IMMUNIZATION DUE: Primary | ICD-10-CM

## 2018-11-29 DIAGNOSIS — M72.2 PLANTAR FASCIITIS: ICD-10-CM

## 2018-11-29 DIAGNOSIS — R51.9 NONINTRACTABLE EPISODIC HEADACHE, UNSPECIFIED HEADACHE TYPE: ICD-10-CM

## 2018-11-29 DIAGNOSIS — E28.2 PCOS (POLYCYSTIC OVARIAN SYNDROME): ICD-10-CM

## 2018-11-29 DIAGNOSIS — E66.9 OBESITY, UNSPECIFIED OBESITY SEVERITY, UNSPECIFIED OBESITY TYPE: ICD-10-CM

## 2018-11-29 DIAGNOSIS — E66.01 MORBID OBESITY (H): ICD-10-CM

## 2018-11-29 DIAGNOSIS — Z23 NEED FOR PROPHYLACTIC VACCINATION AND INOCULATION AGAINST INFLUENZA: ICD-10-CM

## 2018-11-29 DIAGNOSIS — E06.3 HYPOTHYROIDISM DUE TO HASHIMOTO'S THYROIDITIS: ICD-10-CM

## 2018-11-29 DIAGNOSIS — E88.819 INSULIN RESISTANCE: ICD-10-CM

## 2018-11-29 DIAGNOSIS — R73.03 PREDIABETES: ICD-10-CM

## 2018-11-29 LAB — TSH SERPL DL<=0.005 MIU/L-ACNC: 2.2 MU/L (ref 0.4–4)

## 2018-11-29 PROCEDURE — 90686 IIV4 VACC NO PRSV 0.5 ML IM: CPT | Performed by: PHYSICIAN ASSISTANT

## 2018-11-29 PROCEDURE — 36415 COLL VENOUS BLD VENIPUNCTURE: CPT | Performed by: PHYSICIAN ASSISTANT

## 2018-11-29 PROCEDURE — 99214 OFFICE O/P EST MOD 30 MIN: CPT | Mod: 25 | Performed by: PHYSICIAN ASSISTANT

## 2018-11-29 PROCEDURE — 20550 NJX 1 TENDON SHEATH/LIGAMENT: CPT | Performed by: PHYSICIAN ASSISTANT

## 2018-11-29 PROCEDURE — 84443 ASSAY THYROID STIM HORMONE: CPT | Performed by: PHYSICIAN ASSISTANT

## 2018-11-29 PROCEDURE — 90471 IMMUNIZATION ADMIN: CPT | Performed by: PHYSICIAN ASSISTANT

## 2018-11-29 RX ORDER — LIRAGLUTIDE 6 MG/ML
INJECTION SUBCUTANEOUS
Qty: 6 ML | Refills: 3 | Status: SHIPPED | OUTPATIENT
Start: 2018-11-29 | End: 2019-07-10

## 2018-11-29 RX ORDER — TOPIRAMATE 25 MG/1
25 TABLET, FILM COATED ORAL 2 TIMES DAILY
Qty: 180 TABLET | Refills: 3 | Status: SHIPPED | OUTPATIENT
Start: 2018-11-29 | End: 2019-09-16

## 2018-11-29 RX ORDER — METFORMIN HCL 500 MG
TABLET, EXTENDED RELEASE 24 HR ORAL
Qty: 90 TABLET | Refills: 1 | Status: SHIPPED | OUTPATIENT
Start: 2018-11-29 | End: 2019-07-10

## 2018-11-29 RX ORDER — PHENTERMINE HYDROCHLORIDE 37.5 MG/1
37.5 CAPSULE ORAL EVERY MORNING
Qty: 60 CAPSULE | Refills: 0 | Status: SHIPPED | OUTPATIENT
Start: 2018-11-29 | End: 2019-01-02

## 2018-11-29 ASSESSMENT — PAIN SCALES - GENERAL: PAINLEVEL: NO PAIN (0)

## 2018-11-29 NOTE — PROGRESS NOTES

## 2018-11-29 NOTE — PROGRESS NOTES
SUBJECTIVE:   Yvonne Jordan is a 33 year old female who presents to clinic today for the following health issues:      Hypertension Follow-up      Outpatient blood pressures are being checked at home.  Results are 130/77.    Low Salt Diet: not monitoring salt      Amount of exercise or physical activity: 2-3 days/week for an average of 15-30 minutes    Problems taking medications regularly: No    Medication side effects: none    Diet: regular (no restrictions)      Joint Pain right     Onset: x 4 months    Description:   Location: right heel  Character: Sharp and Dull ache    Intensity: moderate    Progression of Symptoms: worse    Accompanying Signs & Symptoms:  Other symptoms: numbness, tingling, warmth, swelling and redness    History:   Previous similar pain: no       Precipitating factors:   Trauma or overuse: no     Alleviating factors:  Improved by: ice, acetaminophen and Ibuprofen    Therapies Tried and outcome: none          Problem list and histories reviewed & adjusted, as indicated.  Additional history: as documented    Patient Active Problem List   Diagnosis     Hyperlipidemia LDL goal <160     Irregular menses     S/P breast augmentation     NATHAN 3 - cervical intraepithelial neoplasia grade 3     Health Care Home     Obesity, unspecified obesity severity, unspecified obesity type     Hypothyroidism due to Hashimoto's thyroiditis     PCOS (polycystic ovarian syndrome)     High risk HPV infection     Morbid obesity (H)     Fatty liver     Past Surgical History:   Procedure Laterality Date     COLPOSCOPY, BIOPSY, COMBINED  2009     CONIZATION  2005     COSMETIC MAMMOPLASTY AUGMENTATION       MOUTH SURGERY      wisdom teeth extraction       Social History   Substance Use Topics     Smoking status: Never Smoker     Smokeless tobacco: Never Used     Alcohol use Yes      Comment: very rare     Family History   Problem Relation Age of Onset     Arthritis Mother      Thyroid Disease Mother      Hypertension  Father      Cancer Maternal Grandmother      Hypertension Maternal Grandmother      Cancer Paternal Grandmother      Non-Hodgkins Lymphoma     Cancer Paternal Grandfather          Current Outpatient Prescriptions   Medication Sig Dispense Refill     insulin pen needle 31G X 6 MM Use once daily or as directed. 100 each 11     levothyroxine (SYNTHROID/LEVOTHROID) 100 MCG tablet Take 1 tablet (100 mcg) by mouth daily 60 tablet 1     lisinopril-hydrochlorothiazide (PRINZIDE/ZESTORETIC) 20-12.5 MG per tablet Take 1 tablet by mouth daily If blood pressure above 130/80, start taking 1/2 tablet. Please let clinic know how your BP is doing. 90 tablet 1     metFORMIN (GLUCOPHAGE-XR) 500 MG 24 hr tablet TAKE ONE TABLET BY MOUTH EVERY DAY WITH DINNER 90 tablet 1     multivitamin, therapeutic with minerals (MULTI-VITAMIN) TABS Take 1 tablet by mouth daily       phentermine 37.5 MG capsule Take 1 capsule (37.5 mg) by mouth every morning 60 capsule 0     topiramate (TOPAMAX) 25 MG tablet TAKE ONE TABLET BY MOUTH TWICE A  tablet 1     VICTOZA PEN 18 MG/3ML soln INJECT 1.2MG UNDER THE SKIN DAILY 6 mL 3     Allergies   Allergen Reactions     Nkda [No Known Drug Allergies]      Recent Labs   Lab Test  08/10/18   1403  06/29/18   1417  12/22/17   1301  11/03/17   1438   07/28/17   1537  07/12/17   1712   09/16/16   0905   07/29/11   0715   A1C   --   5.6   --    --    --   6.1*  6.2*   --    --    --    --    LDL  90   --    --    --    --    --    --    --   81   --   74   HDL  46*   --    --    --    --    --    --    --   42*   --   57   TRIG  162*   --    --    --    --    --    --    --   196*   --   260*   ALT   --   53*  73*  73*   < >  84*  70*   < >   --    --    --    CR   --   0.72   --    --    --   0.63  0.61   < >  0.55   < >   --    GFRESTIMATED   --   >90   --    --    --   >90  Non  GFR Calc    >90  Non  GFR Calc     < >  >90  Non  GFR Calc     < >   --     GFRESTBLACK   --   >90   --    --    --   >90   GFR Calc    >90   GFR Calc     < >  >90   GFR Calc     < >   --    POTASSIUM   --   4.1   --    --    --   4.1  4.0   < >   --    < >   --    TSH  0.01*  <0.01*  3.56  3.51   < >  5.91*  6.88*   --   4.86*   < >  2.77    < > = values in this interval not displayed.      BP Readings from Last 3 Encounters:   11/29/18 117/68   11/15/18 122/65   10/26/18 (!) 88/67    Wt Readings from Last 3 Encounters:   11/29/18 254 lb (115.2 kg)   11/15/18 259 lb (117.5 kg)   10/26/18 260 lb 12.8 oz (118.3 kg)                  Labs reviewed in EPIC    Reviewed and updated as needed this visit by clinical staff       Reviewed and updated as needed this visit by Provider         All other systems negative except as outline above  OBJECTIVE:  Eye exam - right eye normal lid, conjunctiva, cornea, pupil and fundus, left eye normal lid, conjunctiva, cornea, pupil and fundus.  Thyroid not palpable, not enlarged, no nodules detected.  CHEST:chest clear to IPPA, no tachypnea, retractions or cyanosis and S1, S2 normal, no murmur, no gallop, rate regular.  FEET: tenderness to the inferomedial aspect of the effected heel(s)at the insertion of the plantar fascia.    The risks, benefits and potential complications (including but not limited to, bleeding, infection, pain, scar, damage to adjacent structures, atrophy or necrosis of soft tissue, skin blanching, failure to relieve symptoms) of injection were discussed with the patient. Questions were addressed and answered.The patient elected to proceed. Written informed consent was obtained. The correct procedural site was identified and confirmed. A Right plantar fascia  injection was performed using 1mL Depo Medrol 40mg per mL and 2mL (0.25% marcaine) of local anesthetic after sterile prep, to the correct procedural site. Sterile bandaid applied. This was tolerated well by the patient. No apparent  complications. Did also discuss that if diabetic, recommend close monitoring of blood sugars over the next week as cortisone injections can temporarily elevate blood sugars.      .  Yvonne was seen today for hypertension, musculoskeletal problem, imm/inj and flu.    Diagnoses and all orders for this visit:    Immunization due  -     FLU VAC PRESRV FREE QUAD SPLIT VIR, IM (3+ YRS)  -     ADMIN 1st VACCINE    Prediabetes  -     metFORMIN (GLUCOPHAGE-XR) 500 MG 24 hr tablet; TAKE ONE TABLET BY MOUTH EVERY DAY WITH DINNER    PCOS (polycystic ovarian syndrome)  -     metFORMIN (GLUCOPHAGE-XR) 500 MG 24 hr tablet; TAKE ONE TABLET BY MOUTH EVERY DAY WITH DINNER    Obesity, unspecified obesity severity, unspecified obesity type  -     phentermine (ADIPEX-P) 37.5 MG capsule; Take 1 capsule (37.5 mg) by mouth every morning  -     topiramate (TOPAMAX) 25 MG tablet; Take 1 tablet (25 mg) by mouth 2 times daily    Nonintractable episodic headache, unspecified headache type  -     topiramate (TOPAMAX) 25 MG tablet; Take 1 tablet (25 mg) by mouth 2 times daily    Insulin resistance  -     liraglutide (VICTOZA PEN) 18 MG/3ML solution; INJECT 1.2MG UNDER THE SKIN DAILY    Hypothyroidism due to Hashimoto's thyroiditis  -     TSH with free T4 reflex    Plantar fasciitis  -     DEPO MEDROL 40 MG  -     INJECTION SINGLE TENDON ORIGIN/INSERTION    recheck of obesity.   Advised supportive and symptomatic treatment.  Follow up with Provider - if condition persists or worsens.   work on lifestyle modification

## 2018-11-29 NOTE — MR AVS SNAPSHOT
After Visit Summary   11/29/2018    Yvonne Jordan    MRN: [id number]           Patient Information     Date Of Birth          1985        Visit Information        Provider Department      11/29/2018 4:00 PM Antonio Lance PA-C [facility] [place]        Today's Diagnoses     Immunization due    -  1    Prediabetes        PCOS (polycystic ovarian syndrome)        Obesity, unspecified obesity severity, unspecified obesity type        Nonintractable episodic headache, unspecified headache type        Insulin resistance        Hypothyroidism due to Hashimoto's thyroiditis        Plantar fasciitis           Follow-ups after your visit        Who to contact     Normal or non-critical lab and imaging results will be communicated to you by [employer], letter or phone within 4 business days after the clinic has received the results. If you do not hear from us within 7 days, please contact the clinic through [employer] or phone. If you have a critical or abnormal lab result, we will notify you by phone as soon as possible.  Submit refill requests through [employer] or call your pharmacy and they will forward the refill request to us. Please allow 3 business days for your refill to be completed.          If you need to speak with a  for additional information , please call: [phone]             Additional Information About Your Visit        MyChart Information     [employer] gives you secure access to your electronic health record. If you see a primary care provider, you can also send messages to your care team and make appointments. If you have questions, please call your primary care clinic.  If you do not have a primary care provider, please call [phone] and they will assist you.        Care EveryWhere ID     This is your Care EveryWhere ID. This could be used by other organizations to access your [facility] medical records  [id number]        Your Vitals Were     Pulse Temperature  "Respirations Height Last Period Pulse Oximetry    117 98.8  F (37.1  C) (Oral) 18 5' 7\" (1.702 m) 10/07/2018 95%    BMI (Body Mass Index)                   39.78 kg/m2            Blood Pressure from Last 3 Encounters:   11/29/18 117/68   11/15/18 122/65   10/26/18 (!) 88/67    Weight from Last 3 Encounters:   11/29/18 254 lb (115.2 kg)   11/15/18 259 lb (117.5 kg)   10/26/18 260 lb 12.8 oz (118.3 kg)              We Performed the Following     ADMIN 1st VACCINE     DEPO MEDROL 40 MG     FLU VAC PRESRV FREE QUAD SPLIT VIR, IM (3+ YRS)     INJECTION SINGLE TENDON ORIGIN/INSERTION     TSH with free T4 reflex          Today's Medication Changes          These changes are accurate as of 11/29/18  4:37 PM.  If you have any questions, ask your nurse or doctor.               These medicines have changed or have updated prescriptions.        Dose/Directions    topiramate 25 MG tablet   Commonly known as:  TOPAMAX   This may have changed:  See the new instructions.   Used for:  Nonintractable episodic headache, unspecified headache type, Obesity, unspecified obesity severity, unspecified obesity type   Changed by:  Antonio Lance PA-C        Dose:  25 mg   Take 1 tablet (25 mg) by mouth 2 times daily   Quantity:  180 tablet   Refills:  3            Where to get your medicines      These medications were sent to [facility] - [address] [address]  [address] [address] [address]     Phone:  [phone]     liraglutide 18 MG/3ML solution    metFORMIN 500 MG 24 hr tablet    topiramate 25 MG tablet         Some of these will need a paper prescription and others can be bought over the counter.  Ask your nurse if you have questions.     Bring a paper prescription for each of these medications     phentermine 37.5 MG capsule                Primary Care Provider Office Phone # Fax #    Antonio Lance PA-C [phone] [address]  [place] [address]        Equal " Access to Services     [facility]: Hadii andre puri adrienne Hamilton, wajulietada luqadaha, qaybta kaalmadiana rod. So [facility] [phone].    ATENCIÓN: Si samirala navin, tiene a velasquez disposición servicios gratuitos de asistencia lingüística. Llame al [phone].    We comply with applicable federal civil rights laws and [place] laws. We do not discriminate on the basis of race, color, national origin, age, disability, sex, sexual orientation, or gender identity.            Thank you!     Thank you for choosing [facility]  for your care. Our goal is always to provide you with excellent care. Hearing back from our patients is one way we can continue to improve our services. Please take a few minutes to complete the written survey that you may receive in the mail after your visit with us. Thank you!             Your Updated Medication List - Protect others around you: Learn how to safely use, store and throw away your medicines at www.disposemymeds.org.          This list is accurate as of 11/29/18  4:37 PM.  Always use your most recent med list.                   Brand Name Dispense Instructions for use Diagnosis    insulin pen needle 31G X 6 MM miscellaneous    31G X 6 MM    100 each    Use once daily or as directed.    Insulin resistance       levothyroxine 100 MCG tablet    SYNTHROID/LEVOTHROID    60 tablet    Take 1 tablet (100 mcg) by mouth daily    Hypothyroidism due to Hashimoto's thyroiditis       liraglutide 18 MG/3ML solution    VICTOZA PEN    6 mL    INJECT 1.2MG UNDER THE SKIN DAILY    Insulin resistance       lisinopril-hydrochlorothiazide 20-12.5 MG tablet    PRINZIDE/ZESTORETIC    90 tablet    Take 1 tablet by mouth daily If blood pressure above 130/80, start taking 1/2 tablet. Please let clinic know how your BP is doing.    Benign essential hypertension       metFORMIN 500 MG 24 hr tablet    GLUCOPHAGE-XR    90 tablet    TAKE ONE TABLET BY MOUTH  EVERY DAY WITH DINNER    Prediabetes, PCOS (polycystic ovarian syndrome)       Multi-vitamin tablet      Take 1 tablet by mouth daily        phentermine 37.5 MG capsule    ADIPEX-P    60 capsule    Take 1 capsule (37.5 mg) by mouth every morning    Obesity, unspecified obesity severity, unspecified obesity type       topiramate 25 MG tablet    TOPAMAX    180 tablet    Take 1 tablet (25 mg) by mouth 2 times daily    Nonintractable episodic headache, unspecified headache type, Obesity, unspecified obesity severity, unspecified obesity type

## 2018-12-04 ENCOUNTER — TELEPHONE (OUTPATIENT)
Dept: FAMILY MEDICINE | Facility: CLINIC | Age: 33
End: 2018-12-04

## 2018-12-04 NOTE — TELEPHONE ENCOUNTER
Prior Authorization Retail Medication Request    Medication/Dose: liraglutide (VICTOZA PEN) 18 MG/3ML solution  ICD code (if different than what is on RX):  Insulin resistance [E88.81]   Previously Tried and Failed:    Rationale:      Insurance Name:  Medicaid  Insurance ID: [id number]       Pharmacy Information (if different than what is on RX)  Name:  [facility]  Phone:  [phone]

## 2018-12-04 NOTE — TELEPHONE ENCOUNTER
Prior Authorization Approval    Authorization Effective Date: 12/4/2018  Authorization Expiration Date: 12/4/2019  Medication: liraglutide (VICTOZA PEN) 18 MG/3ML solution - APPROVED   Approved Dose/Quantity:   Reference #:     Insurance Company: [employer] - Phone [phone] Fax [phone]  Expected CoPay:       CoPay Card Available:      Foundation Assistance Needed:    Which Pharmacy is filling the prescription (Not needed for infusion/clinic administered): [facility] - [address] [address]  Pharmacy Notified: Yes  Patient Notified: Yes

## 2018-12-04 NOTE — TELEPHONE ENCOUNTER
This PA request was submitted to the insurance by the Central Prior Authorization Team.  If you have any questions in regards to this request, we can be reached at [phone].     Thanks      PA Initiation    Medication: liraglutide (VICTOZA PEN) 18 MG/3ML solution  Insurance Company: [employer] - Phone [phone] Fax [phone]  Pharmacy Filling the Rx: [facility] - [address] [address] [facility]  Filling Pharmacy Phone: [phone]  Filling Pharmacy Fax:    Start Date: 12/4/2018    * PA request could not be submitted on Covermymeds.com. Manual fax request form sent to insurance at [phone]*

## 2018-12-31 ENCOUNTER — TELEPHONE (OUTPATIENT)
Dept: OBGYN | Facility: CLINIC | Age: 33
End: 2018-12-31

## 2018-12-31 NOTE — TELEPHONE ENCOUNTER
Pt is past due for Pap follow up  Reminder letter has been sent  LMTC her clinic with any questions or to schedule    Trini Rosenbaum,   Pap Tracking

## 2019-01-02 DIAGNOSIS — E66.9 OBESITY, UNSPECIFIED OBESITY SEVERITY, UNSPECIFIED OBESITY TYPE: ICD-10-CM

## 2019-01-02 RX ORDER — PHENTERMINE HYDROCHLORIDE 37.5 MG/1
37.5 CAPSULE ORAL EVERY MORNING
Qty: 90 CAPSULE | Refills: 0 | Status: SHIPPED | OUTPATIENT
Start: 2019-01-02 | End: 2019-07-13

## 2019-01-02 NOTE — TELEPHONE ENCOUNTER
Patient requesting refill Phentermine. Last fill 11/29/18 and last O.V. 11/29/18. Please sign if appropriate. Thank you.   Bibiana Tijerina MA

## 2019-01-04 DIAGNOSIS — E06.3 HYPOTHYROIDISM DUE TO HASHIMOTO'S THYROIDITIS: ICD-10-CM

## 2019-01-04 RX ORDER — LEVOTHYROXINE SODIUM 100 UG/1
TABLET ORAL
Qty: 60 TABLET | Refills: 1 | Status: SHIPPED | OUTPATIENT
Start: 2019-01-04 | End: 2019-04-25

## 2019-01-04 NOTE — TELEPHONE ENCOUNTER
"Requested Prescriptions   Pending Prescriptions Disp Refills     levothyroxine (SYNTHROID/LEVOTHROID) 100 MCG tablet [Pharmacy Med Name: LEVOTHYROXINE SODIUM 100MCG TABS] 60 tablet 1    Last Written Prescription Date:  11/26/18  Last Fill Quantity: 60,  # refills: 1   Last office visit: 11/29/2018 with prescribing provider:  MAME Lance Future Office Visit:     Sig: TAKE ONE TABLET BY MOUTH EVERY DAY    Thyroid Protocol Passed - 1/4/2019 12:41 PM       Passed - Patient is 12 years or older       Passed - Recent (12 mo) or future (30 days) visit within the authorizing provider's specialty    Patient had office visit in the last 12 months or has a visit in the next 30 days with authorizing provider or within the authorizing provider's specialty.  See \"Patient Info\" tab in inbasket, or \"Choose Columns\" in Meds & Orders section of the refill encounter.             Passed - Medication is active on med list       Passed - Normal TSH on file in past 12 months    Recent Labs   Lab Test 11/29/18  1654   TSH 2.20             Passed - No active pregnancy on record    If patient is pregnant or has had a positive pregnancy test, please check TSH.         Passed - No positive pregnancy test in past 12 months    If patient is pregnant or has had a positive pregnancy test, please check TSH.            "

## 2019-01-04 NOTE — TELEPHONE ENCOUNTER
Prescription approved per [facility] Refill Protocol.  Sherly Mcrae, RN, BSN

## 2019-03-09 ENCOUNTER — HEALTH MAINTENANCE LETTER (OUTPATIENT)
Age: 34
End: 2019-03-09

## 2019-03-25 ENCOUNTER — OFFICE VISIT (OUTPATIENT)
Dept: OBGYN | Facility: CLINIC | Age: 34
End: 2019-03-25
Payer: COMMERCIAL

## 2019-03-25 VITALS
HEIGHT: 67 IN | SYSTOLIC BLOOD PRESSURE: 116 MMHG | TEMPERATURE: 98.7 F | HEART RATE: 64 BPM | BODY MASS INDEX: 40.49 KG/M2 | WEIGHT: 258 LBS | DIASTOLIC BLOOD PRESSURE: 80 MMHG

## 2019-03-25 DIAGNOSIS — Z31.41 FERTILITY TESTING: ICD-10-CM

## 2019-03-25 DIAGNOSIS — Z01.419 ENCOUNTER FOR GYNECOLOGICAL EXAMINATION WITHOUT ABNORMAL FINDING: ICD-10-CM

## 2019-03-25 DIAGNOSIS — B97.7 HIGH RISK HPV INFECTION: Primary | ICD-10-CM

## 2019-03-25 PROCEDURE — 88175 CYTOPATH C/V AUTO FLUID REDO: CPT | Performed by: OBSTETRICS & GYNECOLOGY

## 2019-03-25 PROCEDURE — 99395 PREV VISIT EST AGE 18-39: CPT | Performed by: OBSTETRICS & GYNECOLOGY

## 2019-03-25 PROCEDURE — 87624 HPV HI-RISK TYP POOLED RSLT: CPT | Performed by: OBSTETRICS & GYNECOLOGY

## 2019-03-25 ASSESSMENT — MIFFLIN-ST. JEOR: SCORE: 1907.91

## 2019-03-25 NOTE — PROGRESS NOTES
Yvonne is a 34 year old  here for annual exam. She is currently wanting to conceive    ROS: Ten point review of systems was reviewed and negative except the above.      Last paps:    Lab Results   Component Value Date    PAP NIL 2017    PAP NIL 2016    PAP NIL 2015     Last cholesterol:   Cholesterol   Date Value Ref Range Status   08/10/2018 168 <200 mg/dL Final   ]        Past Surgical History:   Procedure Laterality Date     COLPOSCOPY, BIOPSY, COMBINED  2009     CONIZATION       COSMETIC MAMMOPLASTY AUGMENTATION       MOUTH SURGERY      wisdom teeth extraction     Past Medical History:   Diagnosis Date     Abnormal Pap smear      Cervical high risk HPV (human papillomavirus) test positive 2015, 16, 17    Neg      Hx of colposcopy with cervical biopsy 17    Bx & ECC - negative     Menstrual migraine      S/P cone biopsy of cervix 2006    HSIL NATHAN 2-3     Thyroid disease         .     Allergies   Allergen Reactions     Nkda [No Known Drug Allergies]        Family History   Problem Relation Age of Onset     Arthritis Mother      Thyroid Disease Mother      Hypertension Father      Cancer Maternal Grandmother      Hypertension Maternal Grandmother      Cancer Paternal Grandmother         Non-Hodgkins Lymphoma     Cancer Paternal Grandfather      Social History     Socioeconomic History     Marital status: Single     Spouse name: Not on file     Number of children: Not on file     Years of education: Not on file     Highest education level: Not on file   Occupational History     Not on file   Social Needs     Financial resource strain: Not on file     Food insecurity:     Worry: Not on file     Inability: Not on file     Transportation needs:     Medical: Not on file     Non-medical: Not on file   Tobacco Use     Smoking status: Never Smoker     Smokeless tobacco: Never Used   Substance and Sexual Activity     Alcohol use: Yes     Comment: very rare     Drug use:  "No     Sexual activity: Yes     Partners: Male     Birth control/protection: Condom     Comment: Microgestin FE BCP   Lifestyle     Physical activity:     Days per week: Not on file     Minutes per session: Not on file     Stress: Not on file   Relationships     Social connections:     Talks on phone: Not on file     Gets together: Not on file     Attends Anabaptism service: Not on file     Active member of club or organization: Not on file     Attends meetings of clubs or organizations: Not on file     Relationship status: Not on file     Intimate partner violence:     Fear of current or ex partner: Not on file     Emotionally abused: Not on file     Physically abused: Not on file     Forced sexual activity: Not on file   Other Topics Concern     Parent/sibling w/ CABG, MI or angioplasty before 65F 55M? Not Asked      Service No     Blood Transfusions No     Caffeine Concern No     Comment: coffee 1 cup per day; pop 1 per day     Occupational Exposure No     Hobby Hazards No     Sleep Concern Yes     Stress Concern Yes     Weight Concern Yes     Special Diet No     Back Care No     Exercise Yes     Comment: 2 x per wk- WII fit     Bike Helmet Not Asked     Comment: N/A     Seat Belt Yes     Comment: 100%     Self-Exams Yes   Social History Narrative     Not on file           PE: /80 (BP Location: Left arm, Patient Position: Chair, Cuff Size: Adult Large)   Pulse 64   Temp 98.7  F (37.1  C) (Oral)   Ht 1.702 m (5' 7\")   Wt 117 kg (258 lb)   LMP 03/15/2019 (Approximate)   Breastfeeding? No   BMI 40.41 kg/m    Body mass index is 40.41 kg/m .    General Appearance:  healthy, alert, active, no distress  Cardiovascular:  Regular rate and Rhythm  Neck: Supple, no adenopathy and thyroid normal  Lungs:  Clear, without wheeze, rale or rhonchi  Breast: normal breast exam  Abdomen: Benign, Soft, flat, non-tender, No masses, organomegaly, No inguinal nodes and Bowel sounds normoactive.   Pelvic:       - Ext: " Vulva and perineum are normal without lesion, mass or discharge        - Urethra: normal without discharge or scarring no hypermobility       - Bladder: No tenderness, No masses       - Vagina: without discharge and rugated       - Cervix: normal       - Uterus:Normal shape, position and consistency       - Adnexa: Normal without masses or tenderness       - Rectal: deferred    A/P Well Woman,      -  I discussed the new pap recommendations regarding screening.  Explained the rationale for increased intervals between paps.  Questions asked and answered.  She does agree to this regiment.    - Pap was performed   -  She is asking to screen for ovulation.  We will order a day 21 progesterone level.  If it is negative, then she will need day 3 FSH,LH, Testoterone and prolactin levels     - Labs:   Orders Placed This Encounter   Procedures     Progesterone     Pap imaged thin layer diagnostic with HPV (select HPV order below)     HPV High Risk Types DNA Cervical       -  Encouraged healthy diet, regular exercise, self-breast examination.  Rafael Coppola MD FACOG

## 2019-03-27 LAB
COPATH REPORT: NORMAL
PAP: NORMAL

## 2019-03-29 LAB
FINAL DIAGNOSIS: NORMAL
HPV HR 12 DNA CVX QL NAA+PROBE: NEGATIVE
HPV16 DNA SPEC QL NAA+PROBE: NEGATIVE
HPV18 DNA SPEC QL NAA+PROBE: NEGATIVE
SPECIMEN DESCRIPTION: NORMAL
SPECIMEN SOURCE CVX/VAG CYTO: NORMAL

## 2019-04-04 DIAGNOSIS — Z31.41 FERTILITY TESTING: ICD-10-CM

## 2019-04-04 LAB — PROGEST SERPL-MCNC: 0.4 NG/ML

## 2019-04-04 PROCEDURE — 36415 COLL VENOUS BLD VENIPUNCTURE: CPT | Performed by: OBSTETRICS & GYNECOLOGY

## 2019-04-04 PROCEDURE — 84144 ASSAY OF PROGESTERONE: CPT | Performed by: OBSTETRICS & GYNECOLOGY

## 2019-04-19 DIAGNOSIS — Z31.41 FERTILITY TESTING: ICD-10-CM

## 2019-04-19 LAB
ESTRADIOL SERPL-MCNC: 35 PG/ML
FSH SERPL-ACNC: 3.1 IU/L
LH SERPL-ACNC: 3.8 IU/L
PROLACTIN SERPL-MCNC: 7 UG/L (ref 3–27)

## 2019-04-19 PROCEDURE — 84270 ASSAY OF SEX HORMONE GLOBUL: CPT | Performed by: OBSTETRICS & GYNECOLOGY

## 2019-04-19 PROCEDURE — 82670 ASSAY OF TOTAL ESTRADIOL: CPT | Performed by: OBSTETRICS & GYNECOLOGY

## 2019-04-19 PROCEDURE — 84146 ASSAY OF PROLACTIN: CPT | Performed by: OBSTETRICS & GYNECOLOGY

## 2019-04-19 PROCEDURE — 36415 COLL VENOUS BLD VENIPUNCTURE: CPT | Performed by: OBSTETRICS & GYNECOLOGY

## 2019-04-19 PROCEDURE — 83002 ASSAY OF GONADOTROPIN (LH): CPT | Performed by: OBSTETRICS & GYNECOLOGY

## 2019-04-19 PROCEDURE — 84403 ASSAY OF TOTAL TESTOSTERONE: CPT | Performed by: OBSTETRICS & GYNECOLOGY

## 2019-04-19 PROCEDURE — 83001 ASSAY OF GONADOTROPIN (FSH): CPT | Performed by: OBSTETRICS & GYNECOLOGY

## 2019-04-20 LAB
SHBG SERPL-SCNC: 23 NMOL/L (ref 30–135)
TESTOST FREE SERPL-MCNC: 0.3 NG/DL (ref 0.13–0.92)
TESTOST SERPL-MCNC: 14 NG/DL (ref 8–60)

## 2019-04-24 ENCOUNTER — OFFICE VISIT (OUTPATIENT)
Dept: OBGYN | Facility: CLINIC | Age: 34
End: 2019-04-24
Payer: COMMERCIAL

## 2019-04-24 VITALS
SYSTOLIC BLOOD PRESSURE: 125 MMHG | BODY MASS INDEX: 41.76 KG/M2 | DIASTOLIC BLOOD PRESSURE: 72 MMHG | WEIGHT: 266.6 LBS | OXYGEN SATURATION: 98 % | HEART RATE: 92 BPM

## 2019-04-24 DIAGNOSIS — Z31.9 PATIENT DESIRES PREGNANCY: ICD-10-CM

## 2019-04-24 DIAGNOSIS — N97.0 ANOVULATION: Primary | ICD-10-CM

## 2019-04-24 PROCEDURE — 99213 OFFICE O/P EST LOW 20 MIN: CPT | Performed by: OBSTETRICS & GYNECOLOGY

## 2019-04-24 NOTE — PROGRESS NOTES
INFERTILITY:      Primary infertility.  Patient is interested in IVF or artifical insemination     Ovulatory factor:  Menarche:  13 years old   Interval:  Monthly for the past year.  Prior to this past year, the cycles were irregular and she had episodes of oligomenorrhea  Regular  Duration:  3-4 days   Moliminal symptoms:  Breast tenderness:  no       Bloating sensation:  Yes     She and her mother state that they were told she has polycystic ovaries.      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   Chemical or toxin exposure at home or work:  None noted  Tobacco: No / Ethanol: Rare / Street drug use: no      Patient's past medical history:  Past Medical History:   Diagnosis Date     Abnormal Pap smear      Cervical high risk HPV (human papillomavirus) test positive 8/2015, 11/25/16, 12/22/17    Neg 16/18     Hx of colposcopy with cervical biopsy 2/2/17    Bx & ECC - negative     Menstrual migraine      S/P cone biopsy of cervix 2/2006    HSIL NATHAN 2-3     Thyroid disease        Past Surgical History:   Procedure Laterality Date     COLPOSCOPY, BIOPSY, COMBINED  2009     CONIZATION  2005     COSMETIC MAMMOPLASTY AUGMENTATION       MOUTH SURGERY      wisdom teeth extraction        Allergies   Allergen Reactions     Nkda [No Known Drug Allergies]        Current Outpatient Medications   Medication Sig Dispense Refill     levothyroxine (SYNTHROID/LEVOTHROID) 100 MCG tablet  TAKE ONE TABLET BY MOUTH EVERY DAY 60 tablet 1     lisinopril-hydrochlorothiazide (PRINZIDE/ZESTORETIC) 20-12.5 MG per tablet Take 1 tablet by mouth daily If blood pressure above 130/80, start taking 1/2 tablet. Please let clinic know how your BP is doing. 90 tablet 1     multivitamin, therapeutic with minerals (MULTI-VITAMIN) TABS Take 1 tablet by mouth daily       insulin pen needle 31G X 6 MM Use once daily or as directed. (Patient not taking: Reported on 4/24/2019) 100 each 11     liraglutide (VICTOZA PEN) 18 MG/3ML solution INJECT 1.2MG UNDER THE SKIN DAILY (Patient not taking: Reported on 4/24/2019) 6 mL 3     metFORMIN (GLUCOPHAGE-XR) 500 MG 24 hr tablet TAKE ONE TABLET BY MOUTH EVERY DAY WITH DINNER (Patient not taking: Reported on 4/24/2019) 90 tablet 1     phentermine (ADIPEX-P) 37.5 MG capsule Take 1 capsule (37.5 mg) by mouth every morning (Patient not taking: Reported on 3/25/2019) 90 capsule 0     topiramate (TOPAMAX) 25 MG tablet Take 1 tablet (25 mg) by mouth 2 times daily (Patient not taking: Reported on 3/25/2019) 180 tablet 3       Social History     Socioeconomic History     Marital status: Single     Spouse name: Not on file     Number of children: Not on file     Years of education: Not on file     Highest education level: Not on file   Occupational History     Not on file   Social Needs     Financial resource strain: Not on file     Food insecurity:     Worry: Not on file     Inability: Not on file     Transportation needs:     Medical: Not on file     Non-medical: Not on file   Tobacco Use     Smoking status: Never Smoker     Smokeless tobacco: Never Used   Substance and Sexual Activity     Alcohol use: Yes     Comment: very rare     Drug use: No     Sexual activity: Yes     Partners: Male     Birth control/protection: Condom     Comment: Microgestin FE BCP   Lifestyle     Physical activity:     Days per week: Not on file     Minutes per session: Not on file     Stress: Not on file    Relationships     Social connections:     Talks on phone: Not on file     Gets together: Not on file     Attends Anabaptism service: Not on file     Active member of club or organization: Not on file     Attends meetings of clubs or organizations: Not on file     Relationship status: Not on file     Intimate partner violence:     Fear of current or ex partner: Not on file     Emotionally abused: Not on file     Physically abused: Not on file     Forced sexual activity: Not on file   Other Topics Concern     Parent/sibling w/ CABG, MI or angioplasty before 65F 55M? Not Asked      Service No     Blood Transfusions No     Caffeine Concern No     Comment: coffee 1 cup per day; pop 1 per day     Occupational Exposure No     Hobby Hazards No     Sleep Concern Yes     Stress Concern Yes     Weight Concern Yes     Special Diet No     Back Care No     Exercise Yes     Comment: 2 x per wk- WII fit     Bike Helmet Not Asked     Comment: N/A     Seat Belt Yes     Comment: 100%     Self-Exams Yes   Social History Narrative     Not on file       Family History   Problem Relation Age of Onset     Arthritis Mother      Thyroid Disease Mother      Hypertension Father      Cancer Maternal Grandmother      Hypertension Maternal Grandmother      Cancer Paternal Grandmother         Non-Hodgkins Lymphoma     Cancer Paternal Grandfather        Review of Systems:  10 point ROS of systems including Constitutional, Eyes, Respiratory, Cardiovascular, Gastroenterology, Genitourinary, Integumentary, Muscularskeletal, Psychiatric were all negative except for pertinent positives noted in my HPI and in the PMH.        Laboratory Tests:  TSH:  11/29/2018:  2.20  PRL:  04/19/2019:  7  FSH:  04/19/2019:  3.1  LH:  04/19/2019:  3.8   LH/FSH Ratio:    Luteal phase progesterone (day 21):  04/04/2019:  0.4  Estradiol (day 3):  04/19/2019:  35  FSH (day 3):  04/19/2019:  3.1  Total Testosterone:  14  Free Testosterone: 0.3  HIV  06/29/2018:   NR  Hepatitis B:  07/31/2017 Core Ab:  NR   IgM NR                        SAg: NR    SAb:  169.43    Hepatitis A:     0/31/2017  IgM:  NR  Hgb A1C:   07/12/2017:  6.2      06/29/2018:  5.6      Component      Latest Ref Rng & Units 7/12/2017   Testosterone Total      8 - 60 ng/dL 40   Sex Hormone Binding Globulin      30 - 135 nmol/L 24 (L)   Free Testosterone Calculated      0.13 - 0.92 ng/dL 0.85   FSH      IU/L 4.3   Lutropin      IU/L 16.9   TSH      0.40 - 4.00 mU/L 6.88 (H)   Progesterone      ng/mL 1.0   Estradiol      pg/mL 133   DHEA Sulfate      35 - 430 ug/dL 205   Prolactin      3 - 27 ug/L 14         ULTRASOUND PELVIC COMPLETE WITH TRANSVAGINAL IMAGING  6/30/2017 10:10  AM   HISTORY: Amenorrhea.  TECHNIQUE:  Transvaginal images were performed to better evaluate the patient's uterus, ovaries and endometrial stripe.  COMPARISON: None.  FINDINGS: Exam is quite limited related to patient body habitus. The uterus is measured at 6.5 x 2.6 x 3.7 cm. No fibroids are evident.  Endometrial stripe measures 1 cm and is within normal limits for a premenopausal patient. The right ovary is not visualized. The left ovary is also not visualized. No solid adnexal masses are identified.  No free pelvic fluid is present.                                                           IMPRESSION:   1. No acute sonographic abnormality is identified in the pelvis.  2. The exam was quite limited related to patient body habitus, and the ovaries could not be visualized.         Review of Systems:  10 point ROS of systems including Constitutional, Eyes, Respiratory, Cardiovascular, Gastroenterology, Genitourinary, Integumentary, Muscularskeletal, Psychiatric were all negative except for pertinent positives noted in my HPI and in the PMH.      Exam  /72 (BP Location: Right arm, Cuff Size: Adult Large)   Pulse 92   Wt 120.9 kg (266 lb 9.6 oz)   LMP 04/20/2019 (Exact Date)   SpO2 98%   Breastfeeding? No   BMI 41.76 kg/m     General:  WNWD female, NAD  Alert  Oriented x 3  Gait:  Normal  Skin:  Normal skin turgor  HEENT:  NC/AT, EOMI  Abdomen:  Non-tender, non-distended.  Pelvic exam:  Not performed  Extremities:  No clubbing, no cyanosis and no edema.        Assessment  Anovulation, uncertain etiology  Patient desires pregnancy  BMI 41-42      Plan  The patient and her mother and I discussed the workup regarding infertility, which she does not quite fit the definition.  It is likely she is anovulatory based on her labs.  Whether she has PCOS is uncertain.  She does not have the androgens, the ultrasound was not able to define the ovarian morphology, and only one set of the labs suggest PCOS from 2 years ago.    We discussed the differences between infertility and the desire for pregnancy without the partner.  It is likely she will need ovulation assistance.  Weight loss might also help with the ovulation.    I suggest she see Reproductive Medicine and 3 groups were suggested to her (see the referral).   There will be some cost involved and patient's mother states the insurance doesn't cover for any reproductive assistance.  The costs will depend upon what procedures are advised.    She might need to return for further testing with us, at the request of Reproductive Medicine.   Questions seem to be answered.     TT 60 min  CT and review of records and discussion, greater than 70%    Xiang Bullock MD

## 2019-04-25 DIAGNOSIS — E06.3 HYPOTHYROIDISM DUE TO HASHIMOTO'S THYROIDITIS: ICD-10-CM

## 2019-04-25 RX ORDER — LEVOTHYROXINE SODIUM 100 UG/1
TABLET ORAL
Qty: 60 TABLET | Refills: 1 | Status: SHIPPED | OUTPATIENT
Start: 2019-04-25 | End: 2019-08-12

## 2019-04-25 NOTE — TELEPHONE ENCOUNTER
"Requested Prescriptions   Pending Prescriptions Disp Refills     levothyroxine (SYNTHROID/LEVOTHROID) 100 MCG tablet [Pharmacy Med Name: LEVOTHYROXINE SODIUM 100MCG TABS]  Last Written Prescription Date:  04/04/19  Last Fill Quantity: 60,  # refills: 1   Last office visit: 11/29/2018 with prescribing provider:  MAME Lance   Future Office Visit:     60 tablet 1     Sig: TAKE ONE TABLET BY MOUTH ONCE DAILY       Thyroid Protocol Passed - 4/25/2019  2:08 PM        Passed - Patient is 12 years or older        Passed - Recent (12 mo) or future (30 days) visit within the authorizing provider's specialty     Patient had office visit in the last 12 months or has a visit in the next 30 days with authorizing provider or within the authorizing provider's specialty.  See \"Patient Info\" tab in inbasket, or \"Choose Columns\" in Meds & Orders section of the refill encounter.              Passed - Medication is active on med list        Passed - Normal TSH on file in past 12 months     Recent Labs   Lab Test 11/29/18  1654   TSH 2.20              Passed - No active pregnancy on record     If patient is pregnant or has had a positive pregnancy test, please check TSH.          Passed - No positive pregnancy test in past 12 months     If patient is pregnant or has had a positive pregnancy test, please check TSH.          "

## 2019-04-26 DIAGNOSIS — I10 BENIGN ESSENTIAL HYPERTENSION: ICD-10-CM

## 2019-04-26 NOTE — TELEPHONE ENCOUNTER
Requested Prescriptions   Pending Prescriptions Disp Refills     lisinopril-hydrochlorothiazide (PRINZIDE/ZESTORETIC) 20-12.5 MG tablet  Last Written Prescription Date:  04/04/19  Last Fill Quantity: 90,  # refills: 1   Last office visit: 11/29/2018 with prescribing provider:  MAME Lance   Future Office Visit:     90 tablet 1     Sig: Take 1 tablet by mouth daily If blood pressure above 130/80, start taking 1/2 tablet. Please let clinic know how your BP is doing.       There is no refill protocol information for this order

## 2019-04-26 NOTE — TELEPHONE ENCOUNTER
Message left on VM to return call to clinic at [phone] or [phone].    - Is patient taking 1 tablet or half a tab daily?   (lisinopril-hydrochlorothiazide)    Arianne Arreola RN BSN PHN

## 2019-04-29 RX ORDER — LISINOPRIL AND HYDROCHLOROTHIAZIDE 12.5; 2 MG/1; MG/1
1 TABLET ORAL DAILY
Qty: 90 TABLET | Refills: 1 | Status: SHIPPED | OUTPATIENT
Start: 2019-04-29 | End: 2019-09-16

## 2019-04-29 NOTE — TELEPHONE ENCOUNTER
Patient taking 1 tablet daily.    BP Readings from Last 3 Encounters:   04/24/19 125/72   03/25/19 116/80   11/29/18 117/68     Prescription approved per [facility] Refill Protocol.  Sherly Mcrae, RN, BSN

## 2019-05-09 ENCOUNTER — TELEPHONE (OUTPATIENT)
Dept: FAMILY MEDICINE | Facility: CLINIC | Age: 34
End: 2019-05-09

## 2019-05-09 DIAGNOSIS — R35.0 URINARY FREQUENCY: Primary | ICD-10-CM

## 2019-05-09 NOTE — TELEPHONE ENCOUNTER
Frequency x3 days. No abdominal or flank/back pain, nausea, fever or chills. Patient requesting to leave urine specimen at [place] location. Ordered. Will route to provider for review. Patient would like to use [facility] please. Thank you

## 2019-05-10 NOTE — TELEPHONE ENCOUNTER
i'm ok with her leaving a urine sample at [facility]. Order signed. If uti confirmed via lab findings, i'll route an rx for an antibiotic to her pharmacy.

## 2019-07-10 DIAGNOSIS — E28.2 PCOS (POLYCYSTIC OVARIAN SYNDROME): ICD-10-CM

## 2019-07-10 DIAGNOSIS — E88.819 INSULIN RESISTANCE: ICD-10-CM

## 2019-07-10 DIAGNOSIS — R73.03 PREDIABETES: ICD-10-CM

## 2019-07-11 DIAGNOSIS — E66.9 OBESITY, UNSPECIFIED OBESITY SEVERITY, UNSPECIFIED OBESITY TYPE: ICD-10-CM

## 2019-07-11 NOTE — TELEPHONE ENCOUNTER
Phentermine      Last Written Prescription Date:  01/02/19  Last Fill Quantity: 90,   # refills: 0  Last Office Visit: 11/29/18  MAME Lance  Future Office visit:       Routing refill request to provider for review/approval because:  Drug not on the [facility], [facility] or [facility] refill protocol or controlled substance

## 2019-07-12 RX ORDER — LIRAGLUTIDE 6 MG/ML
INJECTION SUBCUTANEOUS
Qty: 6 ML | Refills: 0 | Status: SHIPPED | OUTPATIENT
Start: 2019-07-12 | End: 2019-08-12

## 2019-07-12 RX ORDER — FLURBIPROFEN SODIUM 0.3 MG/ML
SOLUTION/ DROPS OPHTHALMIC
Qty: 100 EACH | Refills: 0 | Status: SHIPPED | OUTPATIENT
Start: 2019-07-12 | End: 2019-09-16

## 2019-07-12 RX ORDER — METFORMIN HCL 500 MG
TABLET, EXTENDED RELEASE 24 HR ORAL
Qty: 90 TABLET | Refills: 0 | Status: SHIPPED | OUTPATIENT
Start: 2019-07-12 | End: 2019-09-16

## 2019-07-12 NOTE — TELEPHONE ENCOUNTER
Medication is being filled for 1 time refill only due to:  Patient needs to be seen because it has been more than one year since last visit.  Please schedule follow up.

## 2019-07-13 RX ORDER — PHENTERMINE HYDROCHLORIDE 37.5 MG/1
37.5 CAPSULE ORAL EVERY MORNING
Qty: 90 CAPSULE | Refills: 0 | Status: SHIPPED | OUTPATIENT
Start: 2019-07-13 | End: 2019-09-16

## 2019-08-12 DIAGNOSIS — E06.3 HYPOTHYROIDISM DUE TO HASHIMOTO'S THYROIDITIS: ICD-10-CM

## 2019-08-12 DIAGNOSIS — E88.819 INSULIN RESISTANCE: ICD-10-CM

## 2019-08-12 NOTE — TELEPHONE ENCOUNTER
"Requested Prescriptions   Pending Prescriptions Disp Refills     levothyroxine (SYNTHROID/LEVOTHROID) 100 MCG tablet [Pharmacy Med Name: LEVOTHYROXINE SODIUM 100MCG TABS] 60 tablet 1     Sig: TAKE ONE TABLET BY MOUTH ONCE DAILY  Last Written Prescription Date:  7/11/19  Last Fill Quantity: 60,  # refills: 1   Last office visit: 11/29/2018 with prescribing provider:  COLBY Lance  Future Office Visit:         Thyroid Protocol Passed - 8/12/2019  4:09 PM        Passed - Patient is 12 years or older        Passed - Recent (12 mo) or future (30 days) visit within the authorizing provider's specialty     Patient had office visit in the last 12 months or has a visit in the next 30 days with authorizing provider or within the authorizing provider's specialty.  See \"Patient Info\" tab in inbasket, or \"Choose Columns\" in Meds & Orders section of the refill encounter.              Passed - Medication is active on med list        Passed - Normal TSH on file in past 12 months     Recent Labs   Lab Test 11/29/18  1654   TSH 2.20              Passed - No active pregnancy on record     If patient is pregnant or has had a positive pregnancy test, please check TSH.          Passed - No positive pregnancy test in past 12 months     If patient is pregnant or has had a positive pregnancy test, please check TSH.          liraglutide (VICTOZA PEN) 18 MG/3ML solution [Pharmacy Med Name: VICTOZA 18MG/3ML SOPN] 6 mL 0     Sig: INJECT 1.2MG UNDER THE SKIN DAILY.  Last Written Prescription Date:  7/15/19  Last Fill Quantity: 6 ml,  # refills: 0   Last office visit: 11/29/2018 with prescribing provider:  COLBY Lance   Future Office Visit:         GLP-1 Agonists Protocol Failed - 8/12/2019  4:09 PM        Failed - LDL on file in past 12 months     Recent Labs   Lab Test 08/10/18  1403   LDL 90             Failed - Microalbumin on file in past 12 months     No lab results found.          Failed - HgbA1C in past 3 or 6 months     If HgbA1C is 8 or greater, " "it needs to be on file within the past 3 months.  If less than 8, must be on file within the past 6 months.     Recent Labs   Lab Test 06/29/18  1417   A1C 5.6             Failed - Normal serum creatinine on file in past 12 months     Recent Labs   Lab Test 06/29/18  1417   CR 0.72             Failed - Recent (6 mo) or future (30 days) visit within the authorizing provider's specialty     Patient had office visit in the last 6 months or has a visit in the next 30 days with authorizing provider.  See \"Patient Info\" tab in inbasket, or \"Choose Columns\" in Meds & Orders section of the refill encounter.            Passed - Blood pressure less than 140/90 in past 6 months     BP Readings from Last 3 Encounters:   04/24/19 125/72   03/25/19 116/80   11/29/18 117/68                 Passed - Medication is active on med list        Passed - Patient is age 18 or older        Passed - No active pregnancy on record        Passed - No positive pregnancy test in past 12 months        "

## 2019-08-13 NOTE — TELEPHONE ENCOUNTER
Patient is due for OV with PCP.   Last seen over 1 year ago.     Please call patient to schedule appointment - patient has cancelled last appointment with PCP.     Then route to PCP once appointment has been made for refill approval.     Arianne Arreola RN, BSN, PHN

## 2019-08-13 NOTE — TELEPHONE ENCOUNTER
Routing refill request to provider for review/approval because:  Lisseth given x1 and patient did not follow up, please advise.  Pended with reminder

## 2019-08-14 RX ORDER — LEVOTHYROXINE SODIUM 100 UG/1
TABLET ORAL
Qty: 30 TABLET | Refills: 0 | Status: SHIPPED | OUTPATIENT
Start: 2019-08-14 | End: 2019-09-16

## 2019-08-14 RX ORDER — LIRAGLUTIDE 6 MG/ML
INJECTION SUBCUTANEOUS
Qty: 6 ML | Refills: 0 | Status: SHIPPED | OUTPATIENT
Start: 2019-08-14 | End: 2019-09-16

## 2019-09-16 ENCOUNTER — OFFICE VISIT (OUTPATIENT)
Dept: FAMILY MEDICINE | Facility: CLINIC | Age: 34
End: 2019-09-16
Payer: COMMERCIAL

## 2019-09-16 VITALS
BODY MASS INDEX: 42.69 KG/M2 | OXYGEN SATURATION: 98 % | HEART RATE: 111 BPM | DIASTOLIC BLOOD PRESSURE: 80 MMHG | TEMPERATURE: 98.2 F | HEIGHT: 67 IN | WEIGHT: 272 LBS | RESPIRATION RATE: 18 BRPM | SYSTOLIC BLOOD PRESSURE: 129 MMHG

## 2019-09-16 DIAGNOSIS — E06.3 HYPOTHYROIDISM DUE TO HASHIMOTO'S THYROIDITIS: ICD-10-CM

## 2019-09-16 DIAGNOSIS — E28.2 PCOS (POLYCYSTIC OVARIAN SYNDROME): ICD-10-CM

## 2019-09-16 DIAGNOSIS — I10 BENIGN ESSENTIAL HYPERTENSION: ICD-10-CM

## 2019-09-16 DIAGNOSIS — E66.9 OBESITY, UNSPECIFIED OBESITY SEVERITY, UNSPECIFIED OBESITY TYPE: ICD-10-CM

## 2019-09-16 DIAGNOSIS — E88.819 INSULIN RESISTANCE: ICD-10-CM

## 2019-09-16 DIAGNOSIS — F51.04 PSYCHOPHYSIOLOGICAL INSOMNIA: Primary | ICD-10-CM

## 2019-09-16 DIAGNOSIS — R73.03 PREDIABETES: ICD-10-CM

## 2019-09-16 DIAGNOSIS — R51.9 NONINTRACTABLE EPISODIC HEADACHE, UNSPECIFIED HEADACHE TYPE: ICD-10-CM

## 2019-09-16 LAB — HBA1C MFR BLD: 5.5 % (ref 0–5.6)

## 2019-09-16 PROCEDURE — 83036 HEMOGLOBIN GLYCOSYLATED A1C: CPT | Performed by: PHYSICIAN ASSISTANT

## 2019-09-16 PROCEDURE — 80076 HEPATIC FUNCTION PANEL: CPT | Performed by: PHYSICIAN ASSISTANT

## 2019-09-16 PROCEDURE — 84443 ASSAY THYROID STIM HORMONE: CPT | Performed by: PHYSICIAN ASSISTANT

## 2019-09-16 PROCEDURE — 84439 ASSAY OF FREE THYROXINE: CPT | Performed by: PHYSICIAN ASSISTANT

## 2019-09-16 PROCEDURE — 36415 COLL VENOUS BLD VENIPUNCTURE: CPT | Performed by: PHYSICIAN ASSISTANT

## 2019-09-16 PROCEDURE — 99214 OFFICE O/P EST MOD 30 MIN: CPT | Performed by: PHYSICIAN ASSISTANT

## 2019-09-16 RX ORDER — TRAZODONE HYDROCHLORIDE 50 MG/1
50-100 TABLET, FILM COATED ORAL AT BEDTIME
Qty: 60 TABLET | Refills: 1 | Status: SHIPPED | OUTPATIENT
Start: 2019-09-16 | End: 2020-04-23

## 2019-09-16 RX ORDER — PHENTERMINE HYDROCHLORIDE 37.5 MG/1
37.5 CAPSULE ORAL EVERY MORNING
Qty: 90 CAPSULE | Refills: 0 | Status: SHIPPED | OUTPATIENT
Start: 2019-09-16 | End: 2019-12-31

## 2019-09-16 RX ORDER — METFORMIN HCL 500 MG
TABLET, EXTENDED RELEASE 24 HR ORAL
Qty: 90 TABLET | Refills: 1 | Status: SHIPPED | OUTPATIENT
Start: 2019-09-16 | End: 2020-04-23

## 2019-09-16 RX ORDER — TOPIRAMATE 25 MG/1
25 TABLET, FILM COATED ORAL 2 TIMES DAILY
Qty: 180 TABLET | Refills: 3 | Status: SHIPPED | OUTPATIENT
Start: 2019-09-16 | End: 2020-04-23

## 2019-09-16 RX ORDER — LISINOPRIL AND HYDROCHLOROTHIAZIDE 12.5; 2 MG/1; MG/1
1 TABLET ORAL DAILY
Qty: 90 TABLET | Refills: 1 | Status: SHIPPED | OUTPATIENT
Start: 2019-09-16 | End: 2019-12-12

## 2019-09-16 RX ORDER — LEVOTHYROXINE SODIUM 100 UG/1
100 TABLET ORAL DAILY
Qty: 90 TABLET | Refills: 3 | Status: SHIPPED | OUTPATIENT
Start: 2019-09-16 | End: 2019-09-22

## 2019-09-16 RX ORDER — LIRAGLUTIDE 6 MG/ML
INJECTION SUBCUTANEOUS
Qty: 6 ML | Refills: 3 | Status: SHIPPED | OUTPATIENT
Start: 2019-09-16 | End: 2019-11-25

## 2019-09-16 RX ORDER — FLURBIPROFEN SODIUM 0.3 MG/ML
SOLUTION/ DROPS OPHTHALMIC
Qty: 100 EACH | Refills: 3 | Status: SHIPPED | OUTPATIENT
Start: 2019-09-16 | End: 2020-04-23

## 2019-09-16 ASSESSMENT — MIFFLIN-ST. JEOR: SCORE: 1971.41

## 2019-09-16 NOTE — PROGRESS NOTES
"Subjective     Yvonne Jordan is a 34 year old female who presents to clinic today for the following health issues:    HPI   Hypertension Follow-up      Do you check your blood pressure regularly outside of the clinic? Yes     Are you following a low salt diet? Yes    Are your blood pressures ever more than 140 on the top number (systolic) OR more   than 90 on the bottom number (diastolic), for example 140/90? Yes  Hypothyroidism Follow-up      Since last visit, patient describes the following symptoms: Weight stable, no hair loss, no skin changes, no constipation, no loose stools        How many servings of fruits and vegetables do you eat daily?  2-3    On average, how many sweetened beverages do you drink each day (soda, juice, sweet tea, etc)?   0    How many days per week do you miss taking your medication? 0    Insomnia. Middle insomnia.         Allergies   Allergen Reactions     Nkda [No Known Drug Allergies]      BP Readings from Last 3 Encounters:   09/16/19 129/80   04/24/19 125/72   03/25/19 116/80    Wt Readings from Last 3 Encounters:   09/16/19 123.4 kg (272 lb)   04/24/19 120.9 kg (266 lb 9.6 oz)   03/25/19 117 kg (258 lb)                      Reviewed and updated as needed this visit by Provider         Review of Systems   ROS COMP: Constitutional, HEENT, cardiovascular, pulmonary, GI, , musculoskeletal, neuro, skin, endocrine and psych systems are negative, except as otherwise noted.      Objective    /80   Pulse 111   Temp 98.2  F (36.8  C) (Tympanic)   Resp 18   Ht 1.702 m (5' 7\")   Wt 123.4 kg (272 lb)   SpO2 98%   BMI 42.60 kg/m    Body mass index is 42.6 kg/m .  Physical Exam     Eye exam - right eye normal lid, conjunctiva, cornea, pupil and fundus, left eye normal lid, conjunctiva, cornea, pupil and fundus.  Thyroid not palpable, not enlarged, no nodules detected.  CHEST:chest clear to IPPA, no tachypnea, retractions or cyanosis and S1, S2 normal, no murmur, no gallop, rate " regular.  No edema.    Yvonne was seen today for thyroid problem and hypertension.    Diagnoses and all orders for this visit:    Psychophysiological insomnia  -     traZODone (DESYREL) 50 MG tablet; Take 1-2 tablets ( mg) by mouth At Bedtime    Hypothyroidism due to Hashimoto's thyroiditis  -     levothyroxine (SYNTHROID/LEVOTHROID) 100 MCG tablet; Take 1 tablet (100 mcg) by mouth daily  -     TSH  -     T4, free    Insulin resistance  -     liraglutide (VICTOZA PEN) 18 MG/3ML solution; INJECT 1.2MG UNDER THE SKIN DAILY.  -     ULTICARE MINI 31G X 6 MM insulin pen needle; USE ONCE DAILY OR AS DIRECTED    Benign essential hypertension  -     lisinopril-hydrochlorothiazide (PRINZIDE/ZESTORETIC) 20-12.5 MG tablet; Take 1 tablet by mouth daily If blood pressure above 130/80.--IF BP lower than 130/80 okay to take 1/2 tablet.    Prediabetes  -     metFORMIN (GLUCOPHAGE-XR) 500 MG 24 hr tablet; TAKE ONE TABLET BY MOUTH ONCE DAILY WITH DINNER  -     Hemoglobin A1c  -     Hepatic panel (Albumin, ALT, AST, Bili, Alk Phos, TP)    PCOS (polycystic ovarian syndrome)  -     metFORMIN (GLUCOPHAGE-XR) 500 MG 24 hr tablet; TAKE ONE TABLET BY MOUTH ONCE DAILY WITH DINNER    Obesity, unspecified obesity severity, unspecified obesity type  -     phentermine (ADIPEX-P) 37.5 MG capsule; Take 1 capsule (37.5 mg) by mouth every morning  -     topiramate (TOPAMAX) 25 MG tablet; Take 1 tablet (25 mg) by mouth 2 times daily    Nonintractable episodic headache, unspecified headache type  -     topiramate (TOPAMAX) 25 MG tablet; Take 1 tablet (25 mg) by mouth 2 times daily      work on lifestyle modification  Recheck in

## 2019-09-17 LAB
ALBUMIN SERPL-MCNC: 4.2 G/DL (ref 3.4–5)
ALP SERPL-CCNC: 38 U/L (ref 40–150)
ALT SERPL W P-5'-P-CCNC: 42 U/L (ref 0–50)
AST SERPL W P-5'-P-CCNC: 32 U/L (ref 0–45)
BILIRUB DIRECT SERPL-MCNC: 0.1 MG/DL (ref 0–0.2)
BILIRUB SERPL-MCNC: 0.4 MG/DL (ref 0.2–1.3)
PROT SERPL-MCNC: 8.6 G/DL (ref 6.8–8.8)
T4 FREE SERPL-MCNC: 1.3 NG/DL (ref 0.76–1.46)
TSH SERPL DL<=0.005 MIU/L-ACNC: 0.05 MU/L (ref 0.4–4)

## 2019-09-22 DIAGNOSIS — E06.3 HYPOTHYROIDISM DUE TO HASHIMOTO'S THYROIDITIS: ICD-10-CM

## 2019-09-22 RX ORDER — LEVOTHYROXINE SODIUM 88 UG/1
88 TABLET ORAL DAILY
Qty: 60 TABLET | Refills: 0 | Status: SHIPPED | OUTPATIENT
Start: 2019-09-22 | End: 2019-11-24

## 2019-12-11 ENCOUNTER — TELEPHONE (OUTPATIENT)
Dept: FAMILY MEDICINE | Facility: CLINIC | Age: 34
End: 2019-12-11

## 2019-12-11 DIAGNOSIS — I10 BENIGN ESSENTIAL HYPERTENSION: ICD-10-CM

## 2019-12-11 NOTE — TELEPHONE ENCOUNTER
Pt is confused on BP medication dose.  Should pt be taking 1 pill a day or 1/2 a pill a day.  Pt is taking lisinopril-hydrocholorathizde currently.

## 2019-12-12 RX ORDER — LISINOPRIL AND HYDROCHLOROTHIAZIDE 12.5; 2 MG/1; MG/1
1 TABLET ORAL DAILY
Qty: 90 TABLET | Refills: 1 | COMMUNITY
Start: 2019-12-12 | End: 2020-04-23

## 2019-12-12 NOTE — TELEPHONE ENCOUNTER
Patient notified and voiced understanding and agreement.  Note added to med list  Lubna Menard RN

## 2019-12-29 DIAGNOSIS — E66.9 OBESITY, UNSPECIFIED OBESITY SEVERITY, UNSPECIFIED OBESITY TYPE: ICD-10-CM

## 2019-12-30 NOTE — TELEPHONE ENCOUNTER
Requested Prescriptions   Pending Prescriptions Disp Refills     phentermine (ADIPEX-P) 37.5 MG capsule [Pharmacy Med Name: PHENTERMINE HCL 37.5MG CAPS] 90 capsule 0     Sig: TAKE ONE CAPSULE BY MOUTH EVERY MORNING   Last Written Prescription Date:  10-11-19  Last Fill Quantity: 90,  # refills: 0   Last office visit: 9/16/2019 with prescribing provider:  9-16-19   Future Office Visit:      There is no refill protocol information for this order

## 2019-12-31 RX ORDER — PHENTERMINE HYDROCHLORIDE 37.5 MG/1
CAPSULE ORAL
Qty: 90 CAPSULE | Refills: 0 | Status: SHIPPED | OUTPATIENT
Start: 2019-12-31 | End: 2020-04-23

## 2020-02-06 DIAGNOSIS — E06.3 HYPOTHYROIDISM DUE TO HASHIMOTO'S THYROIDITIS: ICD-10-CM

## 2020-02-06 RX ORDER — LEVOTHYROXINE SODIUM 88 UG/1
88 TABLET ORAL DAILY
Qty: 30 TABLET | Refills: 0 | Status: SHIPPED | OUTPATIENT
Start: 2020-02-06 | End: 2020-03-03

## 2020-02-06 NOTE — TELEPHONE ENCOUNTER
"Requested Prescriptions   Pending Prescriptions Disp Refills     levothyroxine (SYNTHROID/LEVOTHROID) 88 MCG tablet [Pharmacy Med Name: LEVOTHYROXINE SODIUM 88MCG TABS]  Last Written Prescription Date:  12/30/19  Last Fill Quantity: 60,  # refills: 0   Last office visit: 9/16/2019 with prescribing provider:  MAME Lance   Future Office Visit:     60 tablet 0     Sig: TAKE ONE TABLET BY MOUTH ONCE DAILY       Thyroid Protocol Failed - 2/6/2020  8:05 AM        Failed - Normal TSH on file in past 12 months     Recent Labs   Lab Test 09/16/19  1759   TSH 0.05*              Passed - Patient is 12 years or older        Passed - Recent (12 mo) or future (30 days) visit within the authorizing provider's specialty     Patient has had an office visit with the authorizing provider or a provider within the authorizing providers department within the previous 12 mos or has a future within next 30 days. See \"Patient Info\" tab in inbasket, or \"Choose Columns\" in Meds & Orders section of the refill encounter.              Passed - Medication is active on med list        Passed - No active pregnancy on record     If patient is pregnant or has had a positive pregnancy test, please check TSH.          Passed - No positive pregnancy test in past 12 months     If patient is pregnant or has had a positive pregnancy test, please check TSH.            "

## 2020-02-07 RX ORDER — LEVOTHYROXINE SODIUM 88 UG/1
TABLET ORAL
Qty: 60 TABLET | Refills: 0 | OUTPATIENT
Start: 2020-02-07

## 2020-02-11 DIAGNOSIS — E06.3 HYPOTHYROIDISM DUE TO HASHIMOTO'S THYROIDITIS: ICD-10-CM

## 2020-02-11 LAB — TSH SERPL DL<=0.005 MIU/L-ACNC: 3.94 MU/L (ref 0.4–4)

## 2020-02-11 PROCEDURE — 36415 COLL VENOUS BLD VENIPUNCTURE: CPT | Performed by: PHYSICIAN ASSISTANT

## 2020-02-11 PROCEDURE — 84443 ASSAY THYROID STIM HORMONE: CPT | Performed by: PHYSICIAN ASSISTANT

## 2020-02-20 ENCOUNTER — PRENATAL OFFICE VISIT (OUTPATIENT)
Dept: OBGYN | Facility: CLINIC | Age: 35
End: 2020-02-20
Payer: COMMERCIAL

## 2020-02-20 VITALS
TEMPERATURE: 98.7 F | SYSTOLIC BLOOD PRESSURE: 130 MMHG | HEART RATE: 105 BPM | BODY MASS INDEX: 41.19 KG/M2 | DIASTOLIC BLOOD PRESSURE: 82 MMHG | WEIGHT: 263 LBS

## 2020-02-20 DIAGNOSIS — Z34.90 EARLY STAGE OF PREGNANCY: ICD-10-CM

## 2020-02-20 DIAGNOSIS — O34.41 HISTORY OF CONIZATION OF CERVIX AFFECTING PREGNANCY IN FIRST TRIMESTER: ICD-10-CM

## 2020-02-20 DIAGNOSIS — Z98.890 HISTORY OF CONIZATION OF CERVIX AFFECTING PREGNANCY IN FIRST TRIMESTER: ICD-10-CM

## 2020-02-20 DIAGNOSIS — N92.6 MISSED MENSES: Primary | ICD-10-CM

## 2020-02-20 LAB
B-HCG SERPL-ACNC: 4481 IU/L (ref 0–5)
HCG UR QL: POSITIVE

## 2020-02-20 PROCEDURE — 84702 CHORIONIC GONADOTROPIN TEST: CPT | Performed by: OBSTETRICS & GYNECOLOGY

## 2020-02-20 PROCEDURE — 81025 URINE PREGNANCY TEST: CPT | Performed by: OBSTETRICS & GYNECOLOGY

## 2020-02-20 PROCEDURE — 36415 COLL VENOUS BLD VENIPUNCTURE: CPT | Performed by: OBSTETRICS & GYNECOLOGY

## 2020-02-20 PROCEDURE — 99214 OFFICE O/P EST MOD 30 MIN: CPT | Performed by: OBSTETRICS & GYNECOLOGY

## 2020-02-20 RX ORDER — CALCIUM POLYCARBOPHIL 625 MG
TABLET ORAL DAILY
COMMUNITY
End: 2020-07-23

## 2020-02-20 RX ORDER — PRENATAL VIT/IRON FUM/FOLIC AC 27MG-0.8MG
1 TABLET ORAL DAILY
COMMUNITY
End: 2020-04-23

## 2020-02-20 NOTE — PATIENT INSTRUCTIONS
If you have any questions regarding your visit, Please contact your care team.    [facility]: [phone]      Women s Health CLINIC HOURS TELEPHONE NUMBER   MD Malinda Hernández CMA Susie -    SYED [facility]       Monday:       7:30-4:15 [place]  Wednesday:      Administrative  Thursday:       7:30-4:15 [place]  Friday:       Administrative     [facility]  [address] [place]  [address]  [phone] ask for Women's [facility]  [address] [place] [address]  [phone] ask for [facility]    Imaging Scheduling for [place]:  [phone]    Imaging Scheduling for [place]: [phone]       Urgent Care locations:    [facility] Saturday and Sunday   9 am - 5 pm    Monday-Friday   12 pm - 8 pm  Saturday and Sunday   9 am - 5 pm   [phone]     [facility] Labor and Delivery:  [phone]    If you need a medication refill, please contact your pharmacy. Please allow 3 business days for your refill to be completed.  As always, Thank you for trusting us with your healthcare needs!

## 2020-02-20 NOTE — PROGRESS NOTES
Yvonne is a 35 year old   is here today for pregnancy testing due to amenorrhea.  She has had a positive home pregnancy test.     ROS: Ten point review of systems was reviewed and negative except the above.    Gyn Hx:      Past Medical History:   Diagnosis Date     Abnormal Pap smear      Cervical high risk HPV (human papillomavirus) test positive 2015, 16, 17    Neg      Hx of colposcopy with cervical biopsy 17    Bx & ECC - negative     Menstrual migraine      S/P cone biopsy of cervix 2006    HSIL NATHAN 2-3     Thyroid disease      Past Surgical History:   Procedure Laterality Date     COLPOSCOPY, BIOPSY, COMBINED  2009     CONIZATION       COSMETIC MAMMOPLASTY AUGMENTATION       MOUTH SURGERY      wisdom teeth extraction     Patient Active Problem List   Diagnosis     Hyperlipidemia LDL goal <160     Irregular menses     S/P breast augmentation     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    SH: Reviewed and documented in the appropriate area of the chart.    FH: Her family history was reviewed and documented in its appropriate chart area.    PE: /82   Pulse 105   Temp 98.7  F (37.1  C) (Oral)   Wt 119.3 kg (263 lb)   LMP 01/10/2020   Breastfeeding No   BMI 41.19 kg/m    Body mass index is 41.19 kg/m .    Urine HCG was positive      A/P:(N92.6) Missed menses  (primary encounter diagnosis)    Plan: HCG Qual, Urine (LTM3809)      (O34.41,  Z98.890) History of conization of cervix affecting pregnancy in first trimester  Comment: Will need cervical lengths on her early ultrasonds    (Z34.90) Early stage  of pregnancy  Comment: Discussed options, including serial HCGs and early ultrasound, to confirm viability of the pregnancy.  She does want to proceed with this testing.  Plan: US OB < 14 Weeks Single, HCG Quantitative         Pregnancy, Blood ([id number])               - I discussed with her nutrition and medication concerns related to pregnancy.  We discussed folic acid supplementation.  We reviewed prenatal care.  She is given the opportunity to ask questions and have them answered.    30 minutes were spent face to face with the patient today discussing her history, diagnosis, and follow-up plan as noted above.  Over 50% of the visit was spent in counseling and coordination of care.    Total Visit Time: 30 minutes.     -     -   Orders Placed This Encounter   Procedures     HCG Qual, Urine (CAL1755)

## 2020-02-22 DIAGNOSIS — Z34.90 EARLY STAGE OF PREGNANCY: ICD-10-CM

## 2020-02-22 PROCEDURE — 84702 CHORIONIC GONADOTROPIN TEST: CPT | Performed by: OBSTETRICS & GYNECOLOGY

## 2020-02-22 PROCEDURE — 36415 COLL VENOUS BLD VENIPUNCTURE: CPT | Performed by: OBSTETRICS & GYNECOLOGY

## 2020-02-24 ENCOUNTER — HEALTH MAINTENANCE LETTER (OUTPATIENT)
Age: 35
End: 2020-02-24

## 2020-02-24 DIAGNOSIS — Z34.90 EARLY STAGE OF PREGNANCY: ICD-10-CM

## 2020-02-24 LAB
B-HCG SERPL-ACNC: 6625 IU/L (ref 0–5)
B-HCG SERPL-ACNC: ABNORMAL IU/L (ref 0–5)

## 2020-02-24 PROCEDURE — 36415 COLL VENOUS BLD VENIPUNCTURE: CPT | Performed by: OBSTETRICS & GYNECOLOGY

## 2020-02-24 PROCEDURE — 84702 CHORIONIC GONADOTROPIN TEST: CPT | Performed by: OBSTETRICS & GYNECOLOGY

## 2020-02-25 ENCOUNTER — MYC MEDICAL ADVICE (OUTPATIENT)
Dept: OBGYN | Facility: CLINIC | Age: 35
End: 2020-02-25

## 2020-02-25 NOTE — TELEPHONE ENCOUNTER
Per 2/20/20 OV plan:  Z34.90) Early stage of pregnancy  Comment: Discussed options, including serial HCGs and early ultrasound, to confirm viability of the pregnancy.  She does want to proceed with this testing.  Plan: US OB < 14 Weeks Single, HCG Quantitative         Pregnancy, Blood (BRY054)    HCG quant lab result has not been reviewed or interpreted yet.  Will route to provider for further recommendations on pt's mychart message.    America Liu RN

## 2020-02-26 DIAGNOSIS — Z34.90 EARLY STAGE OF PREGNANCY: ICD-10-CM

## 2020-02-26 LAB — B-HCG SERPL-ACNC: ABNORMAL IU/L (ref 0–5)

## 2020-02-26 PROCEDURE — 84702 CHORIONIC GONADOTROPIN TEST: CPT | Performed by: OBSTETRICS & GYNECOLOGY

## 2020-02-26 PROCEDURE — 36415 COLL VENOUS BLD VENIPUNCTURE: CPT | Performed by: OBSTETRICS & GYNECOLOGY

## 2020-02-29 ENCOUNTER — ANCILLARY PROCEDURE (OUTPATIENT)
Dept: ULTRASOUND IMAGING | Facility: CLINIC | Age: 35
End: 2020-02-29
Attending: OBSTETRICS & GYNECOLOGY
Payer: COMMERCIAL

## 2020-02-29 DIAGNOSIS — Z34.90 EARLY STAGE OF PREGNANCY: ICD-10-CM

## 2020-02-29 PROCEDURE — 76801 OB US < 14 WKS SINGLE FETUS: CPT

## 2020-02-29 PROCEDURE — 76817 TRANSVAGINAL US OBSTETRIC: CPT

## 2020-03-01 ENCOUNTER — MYC MEDICAL ADVICE (OUTPATIENT)
Dept: OBGYN | Facility: CLINIC | Age: 35
End: 2020-03-01

## 2020-03-01 DIAGNOSIS — E06.3 HYPOTHYROIDISM DUE TO HASHIMOTO'S THYROIDITIS: ICD-10-CM

## 2020-03-02 NOTE — TELEPHONE ENCOUNTER
See other mychart encounter sent, 3/1/2020, regarding the same symptoms.  I replied to her first message.    America Liu RN

## 2020-03-02 NOTE — TELEPHONE ENCOUNTER
"Requested Prescriptions   Pending Prescriptions Disp Refills     levothyroxine (SYNTHROID/LEVOTHROID) 88 MCG tablet [Pharmacy Med Name: LEVOTHYROXINE SODIUM 88MCG TABS] 30 tablet 0     Sig: TAKE ONE TABLET BY MOUTH ONCE DAILY . (NEED TO BE SEEN IN CLINIC FOR FURTHER REFILLS)   Last Written Prescription Date:  1-28-20  Last Fill Quantity: 30,  # refills: 0   Last office visit: 9/16/2019 with prescribing provider:  9-16-19   Future Office Visit:      Thyroid Protocol Failed - 3/1/2020  9:58 PM        Failed - No active pregnancy on record     If patient is pregnant or has had a positive pregnancy test, please check TSH.          Failed - No positive pregnancy test in past 12 months     If patient is pregnant or has had a positive pregnancy test, please check TSH.          Passed - Patient is 12 years or older        Passed - Recent (12 mo) or future (30 days) visit within the authorizing provider's specialty     Patient has had an office visit with the authorizing provider or a provider within the authorizing providers department within the previous 12 mos or has a future within next 30 days. See \"Patient Info\" tab in inbasket, or \"Choose Columns\" in Meds & Orders section of the refill encounter.              Passed - Medication is active on med list        Passed - Normal TSH on file in past 12 months     Recent Labs   Lab Test 02/11/20  1459   TSH 3.94              "

## 2020-03-03 RX ORDER — LEVOTHYROXINE SODIUM 88 UG/1
TABLET ORAL
Qty: 30 TABLET | Refills: 0 | Status: SHIPPED | OUTPATIENT
Start: 2020-03-03 | End: 2020-04-02

## 2020-03-23 ENCOUNTER — TELEPHONE (OUTPATIENT)
Dept: OBGYN | Facility: CLINIC | Age: 35
End: 2020-03-23

## 2020-03-26 ENCOUNTER — RESULT FOLLOW UP (OUTPATIENT)
Dept: OBGYN | Facility: CLINIC | Age: 35
End: 2020-03-26

## 2020-03-26 ENCOUNTER — PRENATAL OFFICE VISIT (OUTPATIENT)
Dept: OBGYN | Facility: CLINIC | Age: 35
End: 2020-03-26
Payer: COMMERCIAL

## 2020-03-26 VITALS
WEIGHT: 268.7 LBS | HEART RATE: 91 BPM | HEIGHT: 68 IN | DIASTOLIC BLOOD PRESSURE: 77 MMHG | OXYGEN SATURATION: 98 % | SYSTOLIC BLOOD PRESSURE: 120 MMHG | BODY MASS INDEX: 40.72 KG/M2

## 2020-03-26 DIAGNOSIS — Z98.890 HISTORY OF CONIZATION OF CERVIX AFFECTING PREGNANCY IN FIRST TRIMESTER: ICD-10-CM

## 2020-03-26 DIAGNOSIS — Z34.01 SUPERVISION OF NORMAL FIRST PREGNANCY IN FIRST TRIMESTER: Primary | ICD-10-CM

## 2020-03-26 DIAGNOSIS — O34.41 HISTORY OF CONIZATION OF CERVIX AFFECTING PREGNANCY IN FIRST TRIMESTER: ICD-10-CM

## 2020-03-26 LAB
BASOPHILS # BLD AUTO: 0 10E9/L (ref 0–0.2)
BASOPHILS NFR BLD AUTO: 0.3 %
DIFFERENTIAL METHOD BLD: NORMAL
EOSINOPHIL # BLD AUTO: 0.2 10E9/L (ref 0–0.7)
EOSINOPHIL NFR BLD AUTO: 1.9 %
ERYTHROCYTE [DISTWIDTH] IN BLOOD BY AUTOMATED COUNT: 12.8 % (ref 10–15)
HCT VFR BLD AUTO: 37.6 % (ref 35–47)
HGB BLD-MCNC: 12.1 G/DL (ref 11.7–15.7)
IMM GRANULOCYTES # BLD: 0 10E9/L (ref 0–0.4)
IMM GRANULOCYTES NFR BLD: 0.5 %
LYMPHOCYTES # BLD AUTO: 1.9 10E9/L (ref 0.8–5.3)
LYMPHOCYTES NFR BLD AUTO: 24.5 %
MCH RBC QN AUTO: 30.6 PG (ref 26.5–33)
MCHC RBC AUTO-ENTMCNC: 32.2 G/DL (ref 31.5–36.5)
MCV RBC AUTO: 95 FL (ref 78–100)
MONOCYTES # BLD AUTO: 0.9 10E9/L (ref 0–1.3)
MONOCYTES NFR BLD AUTO: 11.4 %
NEUTROPHILS # BLD AUTO: 4.9 10E9/L (ref 1.6–8.3)
NEUTROPHILS NFR BLD AUTO: 61.4 %
PLATELET # BLD AUTO: 175 10E9/L (ref 150–450)
RBC # BLD AUTO: 3.95 10E12/L (ref 3.8–5.2)
WBC # BLD AUTO: 7.9 10E9/L (ref 4–11)

## 2020-03-26 PROCEDURE — 87491 CHLMYD TRACH DNA AMP PROBE: CPT | Performed by: OBSTETRICS & GYNECOLOGY

## 2020-03-26 PROCEDURE — 86762 RUBELLA ANTIBODY: CPT | Performed by: OBSTETRICS & GYNECOLOGY

## 2020-03-26 PROCEDURE — 86901 BLOOD TYPING SEROLOGIC RH(D): CPT | Performed by: OBSTETRICS & GYNECOLOGY

## 2020-03-26 PROCEDURE — 99207 ZZC FIRST OB VISIT: CPT | Performed by: OBSTETRICS & GYNECOLOGY

## 2020-03-26 PROCEDURE — 86780 TREPONEMA PALLIDUM: CPT | Performed by: OBSTETRICS & GYNECOLOGY

## 2020-03-26 PROCEDURE — 87086 URINE CULTURE/COLONY COUNT: CPT | Performed by: OBSTETRICS & GYNECOLOGY

## 2020-03-26 PROCEDURE — 87591 N.GONORRHOEAE DNA AMP PROB: CPT | Performed by: OBSTETRICS & GYNECOLOGY

## 2020-03-26 PROCEDURE — 85025 COMPLETE CBC W/AUTO DIFF WBC: CPT | Performed by: OBSTETRICS & GYNECOLOGY

## 2020-03-26 PROCEDURE — 88175 CYTOPATH C/V AUTO FLUID REDO: CPT | Performed by: OBSTETRICS & GYNECOLOGY

## 2020-03-26 PROCEDURE — 86900 BLOOD TYPING SEROLOGIC ABO: CPT | Performed by: OBSTETRICS & GYNECOLOGY

## 2020-03-26 PROCEDURE — 87389 HIV-1 AG W/HIV-1&-2 AB AG IA: CPT | Performed by: OBSTETRICS & GYNECOLOGY

## 2020-03-26 PROCEDURE — 36415 COLL VENOUS BLD VENIPUNCTURE: CPT | Performed by: OBSTETRICS & GYNECOLOGY

## 2020-03-26 PROCEDURE — 86850 RBC ANTIBODY SCREEN: CPT | Performed by: OBSTETRICS & GYNECOLOGY

## 2020-03-26 PROCEDURE — 87624 HPV HI-RISK TYP POOLED RSLT: CPT | Performed by: OBSTETRICS & GYNECOLOGY

## 2020-03-26 PROCEDURE — 87340 HEPATITIS B SURFACE AG IA: CPT | Performed by: OBSTETRICS & GYNECOLOGY

## 2020-03-26 ASSESSMENT — MIFFLIN-ST. JEOR: SCORE: 1967.32

## 2020-03-26 NOTE — PROGRESS NOTES
"Yvonne is a 35 year old female, , who is here today for her first OB visit at 10 6/7 weeks gestation.  She has had a positive home pregnancy test.  She gained 5 lbs since her last visit and denies any fluid leakage or uterine contractions.  She has not felt fetal movement yet.  She is taking a PNV daily po and denies use of nicotine, etoh, or illicit drugs.  She is due for a diagnostic pap smear due to her hx of \"other\" high risk HPV subtypes on 17.  She denies nausea/emesis issues.    ROS: Ten point review of systems was reviewed and negative except the above.    Gyn Hx:      Past Medical History:   Diagnosis Date     Abnormal Pap smear      Cervical high risk HPV (human papillomavirus) test positive 2015, 16, 17    Neg      Hx of colposcopy with cervical biopsy 17    Bx & ECC - negative     Hypertension      Menstrual migraine      S/P cone biopsy of cervix 2006    HSIL NATHAN 2-3     Thyroid disease      Past Surgical History:   Procedure Laterality Date     COLPOSCOPY, BIOPSY, COMBINED       CONIZATION       COSMETIC MAMMOPLASTY AUGMENTATION       HEAD & NECK SURGERY      wisdom teeth     MOUTH SURGERY      wisdom teeth extraction     Patient Active Problem List   Diagnosis     Hyperlipidemia LDL goal <160     Irregular menses     S/P breast augmentation     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    SH: Reviewed and documented in the appropriate area of the chart.    FH: Her family history was reviewed and documented in its appropriate chart area.    PE: /77   Pulse 91   Ht 1.727 m (5' 8\")   Wt " "121.9 kg (268 lb 11.2 oz)   LMP 01/10/2020   SpO2 98%   Breastfeeding No   BMI 40.86 kg/m    Body mass index is 40.86 kg/m .    Urine HCG was +  Hrt - RRR without murmur  Lungs - CTAB  Breasts - no palpable mass or nipple discharge  Neck - supple without palpable mass  Abd - soft, nontender, unable to hear the fhts with the doppler today, unable to feel the fundus due to body habitus  Pelvic - normal vaginal mucosa, closed cervix without motion tenderness, gravid nontender uterus, nontender adnexa without palpable mass  Ext - negative    A/P:   - I discussed with her nutrition and medication concerns related to pregnancy.  We discussed folic acid supplementation.  We reviewed prenatal care.  She is given the opportunity to ask questions and have them answered.  A diagnostic pap was obtained and submitted due to her recent hx of \"other\" high-risk HPV subtypes on 12/22/17  She plans to schedule a 20-week screening OB US but remains undecided on the MQS.  We discussed her age at the time of delivery but she declines a referral to the genetic counselor at this time.  However, she will call back to have this referral placed if she changes her mind.    30 minutes were spent face to face with the patient today discussing her history, diagnosis, and follow-up plan as noted above.  Over 50% of the visit was spent in counseling and coordination of care.    Total Visit Time: 30 minutes.    Orders Placed This Encounter   Procedures     Hepatitis B surface antigen     Rubella Antibody IgG Quantitative     CBC with platelets differential     HIV Antigen Antibody Combo     Treponema Abs w Reflex to RPR and Titer     Pap imaged thin layer diagnostic with HPV (select HPV order below)     HPV High Risk Types DNA Cervical     ABO/Rh type and screen     Janette Hu, DO  FACOG, FACS  "

## 2020-03-26 NOTE — PATIENT INSTRUCTIONS
If you have any questions regarding your visit, Please contact your care team.    [facility]: [phone]      [facility] CLINIC HOURS TELEPHONE NUMBER   Janette Hu .    JULIET Price -  Sandra -       SYED Cross, RN  Sophia, RN     Monday, Wednesday, Thursday and Friday, [place]  8:30a.m-5:00 p.m   [facility]  [address] [place] [address]  [phone] ask for [facility]    Imaging Gayreriaab-[phone]       Urgent Care locations:    [facility] Saturday and Sunday   9 am - 5 pm    Monday-Friday   12 pm - 8 pm  Saturday and Sunday   9 am - 5 pm   [phone]     [facility] Labor and Delivery:  [phone]    If you need a medication refill, please contact your pharmacy. Please allow 3 business days for your refill to be completed.  As always, Thank you for trusting us with your healthcare needs!

## 2020-03-27 LAB
ABO + RH BLD: NORMAL
ABO + RH BLD: NORMAL
BACTERIA SPEC CULT: NORMAL
BACTERIA SPEC CULT: NORMAL
BLD GP AB SCN SERPL QL: NORMAL
BLOOD BANK CMNT PATIENT-IMP: NORMAL
C TRACH DNA SPEC QL NAA+PROBE: NEGATIVE
HBV SURFACE AG SERPL QL IA: NONREACTIVE
HIV 1+2 AB+HIV1 P24 AG SERPL QL IA: NONREACTIVE
N GONORRHOEA DNA SPEC QL NAA+PROBE: NEGATIVE
RUBV IGG SERPL IA-ACNC: 12 IU/ML
SPECIMEN EXP DATE BLD: NORMAL
SPECIMEN SOURCE: NORMAL
T PALLIDUM AB SER QL: NONREACTIVE

## 2020-03-30 LAB
COPATH REPORT: NORMAL
PAP: NORMAL

## 2020-04-02 DIAGNOSIS — E06.3 HYPOTHYROIDISM DUE TO HASHIMOTO'S THYROIDITIS: ICD-10-CM

## 2020-04-02 LAB
FINAL DIAGNOSIS: ABNORMAL
HPV HR 12 DNA CVX QL NAA+PROBE: POSITIVE
HPV16 DNA SPEC QL NAA+PROBE: NEGATIVE
HPV18 DNA SPEC QL NAA+PROBE: NEGATIVE
SPECIMEN DESCRIPTION: ABNORMAL
SPECIMEN SOURCE CVX/VAG CYTO: ABNORMAL

## 2020-04-02 NOTE — TELEPHONE ENCOUNTER
Per Kathrin, pharmacy states that Yvonne doesn't have any refills for her levothyroxine, would like to get a 6 month refill so they don't need to call every month.    Lilian Bolivar, CMA

## 2020-04-02 NOTE — TELEPHONE ENCOUNTER
"Requested Prescriptions   Pending Prescriptions Disp Refills     levothyroxine (SYNTHROID/LEVOTHROID) 88 MCG tablet [Pharmacy Med Name: LEVOTHYROXINE SODIUM 88MCG TABS] 30 tablet 0     Sig: TAKE ONE TABLET BY MOUTH ONCE DAILY . (NEED TO BE SEEN IN CLINIC FOR FURTHER REFILLS)  Last Written Prescription Date:  3/3/20  Last Fill Quantity: 30,  # refills: 0   Last office visit: 9/16/2019 with prescribing provider:  COLBY Lance  Future Office Visit:   Next 5 appointments (look out 90 days)    Apr 23, 2020  3:00 PM CDT  ESTABLISHED PRENATAL with Rafael Coppola MD  [facility] ([facility]) [address] [address] [place] [id number] [phone]              Thyroid Protocol Failed - 4/2/2020  8:26 AM        Failed - No active pregnancy on record     If patient is pregnant or has had a positive pregnancy test, please check TSH.          Failed - No positive pregnancy test in past 12 months     If patient is pregnant or has had a positive pregnancy test, please check TSH.          Passed - Patient is 12 years or older        Passed - Recent (12 mo) or future (30 days) visit within the authorizing provider's specialty     Patient has had an office visit with the authorizing provider or a provider within the authorizing providers department within the previous 12 mos or has a future within next 30 days. See \"Patient Info\" tab in inbasket, or \"Choose Columns\" in Meds & Orders section of the refill encounter.              Passed - Medication is active on med list        Passed - Normal TSH on file in past 12 months     Recent Labs   Lab Test 02/11/20  1459   TSH 3.94                 "

## 2020-04-03 RX ORDER — LEVOTHYROXINE SODIUM 88 UG/1
TABLET ORAL
Qty: 30 TABLET | Refills: 10 | Status: SHIPPED | OUTPATIENT
Start: 2020-04-03 | End: 2021-03-01

## 2020-04-13 NOTE — PROGRESS NOTES
12/2005:Pap--ASC-US +HPV  1/2006:[place]--HSIL, NATHAN 2-3  2/2006:[facility]--HSIL  7/2006:[place]--NATHAN 1  2/2007:Pap--LSIL  3/2007:[place]  8/2007:Pap--LSIL  10/2007:Pap--LSIL  12/2007:[place]--Negative. Rpt in 6 mon  8/2008:Pap--LSIL  12/21/09:Pap--NIL. Rpt in 6 mon.  9/29/10:Pap--NIL. Rpt in 6 mon.  7/28/11:Pap--NIL. Rpt in one year.    2/16/12: Pap--NIL. Repeat pap in 1 year. Pap tracking episode resolved.  10/31/14: ASCUS Pap, + HR HPV. ([facility])  12/31/14: [place] Bx - NATHAN 1, ECC - negative ([facility])  8/28/15: Pap - NIL, + HR HPV. Plan cotest in 1 year.  11/25/16: NIL Pap, + HR HPV. Plan colp  2/2/17: [place] Bx & ECC - negative. Plan cotest in 1 year.   12/22/17 NIL pap, + HR HPV (not 16/18). Plan colp per provider   4/12/18 [place] - visually normal. Plan cotest in 1 year from pap date due by 12/22/18  1/15/19 Lost to follow-up for pap tracking  3/25/19 NIL pap,  Neg HPV. Plan cotest in 3 years  3/26/20 NIL pap, + HR HPV (not 16 or 18) Plan: [place] at next OB Check, Per Dr. Hu.   4/8/20 My Chart to patient from clinic team. Patient read my chart.  4/13/20 Per Dr. Coppola, lori postpartum, JOHN: 10/16/20.  4/23/20 Ob visit - phone visit. With Dr. Long.

## 2020-04-23 ENCOUNTER — VIRTUAL VISIT (OUTPATIENT)
Dept: FAMILY MEDICINE | Facility: CLINIC | Age: 35
End: 2020-04-23
Payer: COMMERCIAL

## 2020-04-23 ENCOUNTER — TELEPHONE (OUTPATIENT)
Dept: FAMILY MEDICINE | Facility: CLINIC | Age: 35
End: 2020-04-23

## 2020-04-23 ENCOUNTER — VIRTUAL VISIT (OUTPATIENT)
Dept: OBGYN | Facility: CLINIC | Age: 35
End: 2020-04-23
Payer: COMMERCIAL

## 2020-04-23 DIAGNOSIS — O99.280 THYROID DISEASE AFFECTING PREGNANCY: ICD-10-CM

## 2020-04-23 DIAGNOSIS — H60.393 INFECTIVE OTITIS EXTERNA, BILATERAL: ICD-10-CM

## 2020-04-23 DIAGNOSIS — E07.9 THYROID DISEASE AFFECTING PREGNANCY: ICD-10-CM

## 2020-04-23 DIAGNOSIS — H92.03 OTALGIA, BILATERAL: Primary | ICD-10-CM

## 2020-04-23 DIAGNOSIS — Z87.19 HISTORY OF FATTY INFILTRATION OF LIVER: ICD-10-CM

## 2020-04-23 DIAGNOSIS — Z34.00 SUPERVISION OF NORMAL FIRST PREGNANCY, ANTEPARTUM: Primary | ICD-10-CM

## 2020-04-23 PROCEDURE — 99207 ZZC COMPLICATED OB VISIT: CPT | Performed by: OBSTETRICS & GYNECOLOGY

## 2020-04-23 PROCEDURE — 99214 OFFICE O/P EST MOD 30 MIN: CPT | Mod: 95 | Performed by: PHYSICIAN ASSISTANT

## 2020-04-23 RX ORDER — CEPHALEXIN 500 MG/1
500 CAPSULE ORAL 3 TIMES DAILY
Qty: 21 CAPSULE | Refills: 0 | Status: SHIPPED | OUTPATIENT
Start: 2020-04-23 | End: 2020-04-23

## 2020-04-23 RX ORDER — NEOMYCIN SULFATE, POLYMYXIN B SULFATE AND HYDROCORTISONE 10; 3.5; 1 MG/ML; MG/ML; [USP'U]/ML
3 SUSPENSION/ DROPS AURICULAR (OTIC) 3 TIMES DAILY
Qty: 1 BOTTLE | Refills: 0 | Status: SHIPPED | OUTPATIENT
Start: 2020-04-23 | End: 2020-04-23

## 2020-04-23 NOTE — PROGRESS NOTES
"Yvonne Jordan is a 35 year old female who is being evaluated via a billable telephone visit.      The patient has been notified of following:     \"This telephone visit will be conducted via a call between you and your physician/provider. We have found that certain health care needs can be provided without the need for a physical exam.  This service lets us provide the care you need with a short phone conversation.  If a prescription is necessary we can send it directly to your pharmacy.  If lab work is needed we can place an order for that and you can then stop by our lab to have the test done at a later time.    Telephone visits are billed at different rates depending on your insurance coverage. During this emergency period, for some insurers they may be billed the same as an in-person visit.  Please reach out to your insurance provider with any questions.    If during the course of the call the physician/provider feels a telephone visit is not appropriate, you will not be charged for this service.\"    Patient has given verbal consent for Telephone visit?  Yes    How would you like to obtain your AVS? Joshuahart    Phone call duration: 16 minutes      Presents for routine  appointment.     No complaints.    No abnormal discharge , no leaking fluid , no contractions , no vaginal bleeding    ROS:   and GI  negative.     Please see Prenatal Vitals and Notes Flowsheet for objective data.  TSH   Date Value Ref Range Status   2020 3.94 0.40 - 4.00 mU/L Final        A/P:  35 year old  at 14w6d       ICD-10-CM    1. Supervision of normal first pregnancy, antepartum  Z34.00 Maternal Quad Marker 2nd Trimester   2. Thyroid disease affecting pregnancy  O99.280 TSH with free T4 reflex    E07.9    3. History of fatty infiltration of liver  Z87.19 **Hepatic panel FUTURE 2mo       Genetic screening - plan quad when she comes in for her routine 20 week US.   History [facility] - plan cervical length MFM referral " placed  20 week ultrasound scheduled May 28th  Follow up in 5 week(s).  She can schedule that the same day as her anatomy screen.  She can also do a quad screen and TSH that day.  Will also do LFTs due to her history of fatty liver to get baseline labs.       Dee Long MD

## 2020-04-23 NOTE — PROGRESS NOTES
"Yvonne Jordan is a 35 year old female who is being evaluated via a billable telephone visit.      The patient has been notified of following:     \"This telephone visit will be conducted via a call between you and your physician/provider. We have found that certain health care needs can be provided without the need for a physical exam.  This service lets us provide the care you need with a short phone conversation.  If a prescription is necessary we can send it directly to your pharmacy.  If lab work is needed we can place an order for that and you can then stop by our lab to have the test done at a later time.    Telephone visits are billed at different rates depending on your insurance coverage. During this emergency period, for some insurers they may be billed the same as an in-person visit.  Please reach out to your insurance provider with any questions.    If during the course of the call the physician/provider feels a telephone visit is not appropriate, you will not be charged for this service.\"    Patient has given verbal consent for Telephone visit?  Yes    How would you like to obtain your AVS? MyChart    Subjective     Yvonne Jordan is a 35 year old female who presents to clinic today for the following health issues:    HPI  Description: both ears-pain scale is 7/8, both sides below ear/neck is swollen, right side is worse jaw pain on the right side  Duration: 2 days  Treatments tried: tylenol    Bilateral ear pain.   No recent swimming.   Feels like her canal is swollen and irritated.   Uses q tips.  No polyuria or polydipsia.   Mild allergy symptoms. No pain with chewing.  No fevers.   Right ear, muffled hearing . No crackling.       Patient Active Problem List   Diagnosis     Hyperlipidemia LDL goal <160     Irregular menses     S/P breast augmentation     NATHAN 3 - cervical intraepithelial neoplasia grade 3     Health Care Home     Obesity, unspecified obesity severity, unspecified obesity type     " Hypothyroidism due to Hashimoto's thyroiditis     PCOS (polycystic ovarian syndrome)     High risk HPV infection     Morbid obesity (H)     Fatty liver     Past Surgical History:   Procedure Laterality Date     COLPOSCOPY, BIOPSY, COMBINED  2009     CONIZATION  2005     COSMETIC MAMMOPLASTY AUGMENTATION       HEAD & NECK SURGERY      wisdom teeth     MOUTH SURGERY      wisdom teeth extraction       Social History     Tobacco Use     Smoking status: Never Smoker     Smokeless tobacco: Never Used   Substance Use Topics     Alcohol use: Not Currently     Comment: very rare     Family History   Problem Relation Age of Onset     Arthritis Mother      Thyroid Disease Mother      Hypertension Father      Diabetes Father      Cancer Maternal Grandmother      Hypertension Maternal Grandmother      Cancer Paternal Grandmother      Lung Cancer Paternal Grandmother      Cancer Paternal Grandfather         Non-Hodgkins Lymphoma         Current Outpatient Medications   Medication Sig Dispense Refill     Calcium Polycarbophil (FIBER) 625 MG PO tablet Take by mouth daily       levothyroxine (SYNTHROID/LEVOTHROID) 88 MCG tablet TAKE ONE TABLET BY MOUTH ONCE DAILY . (NEED TO BE SEEN IN CLINIC FOR FURTHER REFILLS) 30 tablet 10     Prenatal Vit-Fe Fumarate-FA (PRENATAL VITAMIN PO)        liraglutide (VICTOZA PEN) 18 MG/3ML solution INJECT 1.8 MG UNDER THE SKIN DAILY. (Patient not taking: Reported on 4/23/2020) 9 mL 3     lisinopril-hydrochlorothiazide (PRINZIDE/ZESTORETIC) 20-12.5 MG tablet Take 1 tablet by mouth daily If blood pressure above 130/80.--IF BP lower than 130/80 okay to take 1/2 tablet. 90 tablet 1     metFORMIN (GLUCOPHAGE-XR) 500 MG 24 hr tablet TAKE ONE TABLET BY MOUTH ONCE DAILY WITH DINNER (Patient not taking: Reported on 4/23/2020) 90 tablet 1     phentermine (ADIPEX-P) 37.5 MG capsule TAKE ONE CAPSULE BY MOUTH EVERY MORNING (Patient not taking: Reported on 4/23/2020) 90 capsule 0     topiramate (TOPAMAX) 25 MG  tablet Take 1 tablet (25 mg) by mouth 2 times daily (Patient not taking: Reported on 4/23/2020) 180 tablet 3     traZODone (DESYREL) 50 MG tablet Take 1-2 tablets ( mg) by mouth At Bedtime (Patient not taking: Reported on 4/23/2020) 60 tablet 1     ULTICARE MINI 31G X 6 MM insulin pen needle USE ONCE DAILY OR AS DIRECTED (Patient not taking: Reported on 4/23/2020) 100 each 3     Allergies   Allergen Reactions     Nkda [No Known Drug Allergies]      Recent Labs   Lab Test 02/11/20  1459 09/16/19  1759  08/10/18  1403 06/29/18  1417 12/22/17  1301  07/28/17  1537  09/16/16  0905   A1C  --  5.5  --   --  5.6  --   --  6.1*   < >  --    LDL  --   --   --  90  --   --   --   --   --  81   HDL  --   --   --  46*  --   --   --   --   --  42*   TRIG  --   --   --  162*  --   --   --   --   --  196*   ALT  --  42  --   --  53* 73*   < > 84*   < >  --    CR  --   --   --   --  0.72  --   --  0.63   < > 0.55   GFRESTIMATED  --   --   --   --  >90  --   --  >90  Non  GFR Calc     < > >90  Non  GFR Calc     GFRESTBLACK  --   --   --   --  >90  --   --  >90  African American GFR Calc     < > >90   GFR Calc     POTASSIUM  --   --   --   --  4.1  --   --  4.1   < >  --    TSH 3.94 0.05*   < > 0.01* <0.01* 3.56   < > 5.91*   < > 4.86*    < > = values in this interval not displayed.      BP Readings from Last 3 Encounters:   03/26/20 120/77   02/20/20 130/82   09/16/19 129/80    Wt Readings from Last 3 Encounters:   03/26/20 121.9 kg (268 lb 11.2 oz)   02/20/20 119.3 kg (263 lb)   09/16/19 123.4 kg (272 lb)                    Reviewed and updated as needed this visit by Provider         Review of Systems   ROS COMP: Constitutional, HEENT, cardiovascular, pulmonary, GI, , musculoskeletal, neuro, skin, endocrine and psych systems are negative, except as otherwise noted.       Objective   Reported vitals:  LMP 01/10/2020    healthy, alert and no distress  PSYCH: Alert and oriented  times 3; coherent speech, normal   rate and volume, able to articulate logical thoughts, able   to abstract reason, no tangential thoughts, no hallucinations   or delusions  Her affect is normal  RESP: No cough, no audible wheezing, able to talk in full sentences  Remainder of exam unable to be completed due to telephone visits    Diagnostic Test Results:  Labs reviewed in Epic        Assessment/Plan:  1. Otalgia, bilateral  tylenol    2. Infective otitis externa, bilateral  Warm compresses.    - neomycin-polymyxin-hydrocortisone (CORTISPORIN) 3.5-20815-0 otic suspension; Place 3 drops into both ears 3 times daily for 7 days  Dispense: 1 Bottle; Refill: 0  - cephALEXin (KEFLEX) 500 MG capsule; Take 1 capsule (500 mg) by mouth 3 times daily for 7 days  Dispense: 21 capsule; Refill: 0        Phone call duration:  13 minutes    Antonio Lance PA-C

## 2020-04-23 NOTE — TELEPHONE ENCOUNTER
Patient states she has a double ear infection because it is swollen but no drainage.  She is 15 weeks pregnant and the OB nurse suggested she contact her provider and be prescribed amoxicillin.  This has been going on for 3 days.    Thank you.

## 2020-04-28 ENCOUNTER — TELEPHONE (OUTPATIENT)
Dept: OBGYN | Facility: OTHER | Age: 35
End: 2020-04-28

## 2020-04-28 ENCOUNTER — MYC MEDICAL ADVICE (OUTPATIENT)
Dept: OBGYN | Facility: CLINIC | Age: 35
End: 2020-04-28

## 2020-04-28 DIAGNOSIS — Z34.00 SUPERVISION OF NORMAL FIRST PREGNANCY, ANTEPARTUM: Primary | ICD-10-CM

## 2020-04-28 NOTE — TELEPHONE ENCOUNTER
[employer] message sent to patient relaying the message below.     Yvonne Scherer RN on 4/28/2020 at 3:07 PM

## 2020-04-28 NOTE — TELEPHONE ENCOUNTER
[facility] Call Center    Phone Message    May a detailed message be left on voicemail: yes     Reason for Call: Other: patient is calling to see if Ultrasound can be order that was discussed during telephone visit. please advise     Action Taken: Message routed to:  [facility] p [phone]

## 2020-04-28 NOTE — TELEPHONE ENCOUNTER
RN reviewed telephone visit from 4/23/2020 with Dr. Long- Only ultra sound mentioned was her 20 weeks fetal anatomy survey which is already scheduled for 5/28/2020.     Patient may be referring to MFM referral to check on cervical length. Usually we wait about 7 days for MFM to contact patient. If she has not heard from them after a week we can offer phone number [phone].    Unable to reach patient via phone. Left message to call clinic back at [phone] and ask for [facility].    Yvonne Scherer RN on 4/28/2020 at 1:01 PM

## 2020-04-29 NOTE — TELEPHONE ENCOUNTER
LM stating that I would try to call her back tomorrow to discuss the ultrasound.  It has proved to be difficult to communicate the reason behind [facility], so would like to discuss this over the phone.

## 2020-04-29 NOTE — TELEPHONE ENCOUNTER
Patient viewed her [employer] message. RN will close encounter. If patient has further questions or concerns she can call clinic.     Yvonne Scherer RN on 4/29/2020 at 9:55 AM

## 2020-04-30 NOTE — TELEPHONE ENCOUNTER
Patient needs- 20 week ultra sound screen/ fetal anatomy survey in addition to checking cervical length.     RN called and spoke to radiology. They were able to assist with this. Once Dr. Long signs order RN will call them back to notify them so they can switch her scheduled ultra sound to new order.       Change order to [id number].     RN routing to Ethan to sign off on.     Yvonne Scherer RN on 4/30/2020 at 2:12 PM

## 2020-04-30 NOTE — TELEPHONE ENCOUNTER
Please call radiology to determine the exact order they need.  Not able to see US cervical length in the orders.

## 2020-05-01 NOTE — TELEPHONE ENCOUNTER
RN called [facility] and spoke with Rodrigue. She will change the appointment to the new order.     No further action needed at this time.     Patient updated via [employer] communication.     Yvonne Scherer RN on 5/1/2020 at 9:06 AM

## 2020-05-28 ENCOUNTER — ANCILLARY PROCEDURE (OUTPATIENT)
Dept: ULTRASOUND IMAGING | Facility: CLINIC | Age: 35
End: 2020-05-28
Attending: OBSTETRICS & GYNECOLOGY
Payer: COMMERCIAL

## 2020-05-28 DIAGNOSIS — E07.9 THYROID DISEASE AFFECTING PREGNANCY: ICD-10-CM

## 2020-05-28 DIAGNOSIS — Z87.19 HISTORY OF FATTY INFILTRATION OF LIVER: ICD-10-CM

## 2020-05-28 DIAGNOSIS — Z34.00 SUPERVISION OF NORMAL FIRST PREGNANCY, ANTEPARTUM: ICD-10-CM

## 2020-05-28 DIAGNOSIS — O99.280 THYROID DISEASE AFFECTING PREGNANCY: ICD-10-CM

## 2020-05-28 LAB
ALBUMIN SERPL-MCNC: 3.2 G/DL (ref 3.4–5)
ALP SERPL-CCNC: 37 U/L (ref 40–150)
ALT SERPL W P-5'-P-CCNC: 15 U/L (ref 0–50)
AST SERPL W P-5'-P-CCNC: 12 U/L (ref 0–45)
BILIRUB DIRECT SERPL-MCNC: <0.1 MG/DL (ref 0–0.2)
BILIRUB SERPL-MCNC: 0.2 MG/DL (ref 0.2–1.3)
PROT SERPL-MCNC: 7.5 G/DL (ref 6.8–8.8)
TSH SERPL DL<=0.005 MIU/L-ACNC: 2.17 MU/L (ref 0.4–4)

## 2020-05-28 PROCEDURE — 76817 TRANSVAGINAL US OBSTETRIC: CPT

## 2020-05-28 PROCEDURE — 76805 OB US >/= 14 WKS SNGL FETUS: CPT

## 2020-05-28 PROCEDURE — 99000 SPECIMEN HANDLING OFFICE-LAB: CPT | Performed by: OBSTETRICS & GYNECOLOGY

## 2020-05-28 PROCEDURE — 80076 HEPATIC FUNCTION PANEL: CPT | Performed by: OBSTETRICS & GYNECOLOGY

## 2020-05-28 PROCEDURE — 84443 ASSAY THYROID STIM HORMONE: CPT | Performed by: OBSTETRICS & GYNECOLOGY

## 2020-05-28 PROCEDURE — 81511 FTL CGEN ABNOR FOUR ANAL: CPT | Mod: 90 | Performed by: OBSTETRICS & GYNECOLOGY

## 2020-05-28 PROCEDURE — 36415 COLL VENOUS BLD VENIPUNCTURE: CPT | Performed by: OBSTETRICS & GYNECOLOGY

## 2020-05-28 NOTE — RESULT ENCOUNTER NOTE
Hi,    Your ultrasound shows your cervical length is 2.8 cm and the anatomy screen was normal, but they were not able to see the baby's face well.  It looks like you have an appointment with Dr. Coppola on the 1st.  I have ordered a repeat US to get another look at the face, which can be done in 2-4 weeks.  Dr. Coppola can also discuss the cervical length measurements with you in more detail at that time if you have questions.      Thanks,  Dr. Long

## 2020-05-31 LAB
# FETUSES US: NORMAL
# FETUSES: 1
AFP ADJ MOM AMN: 1.03
AFP SERPL-MCNC: 38 NG/ML
AGE - REPORTED: 34.7 YR
CURRENT SMOKER: NO
CURRENT SMOKER: NO
DIABETES STATUS PATIENT: NO
FAMILY MEMBER DISEASES HX: NO
FAMILY MEMBER DISEASES HX: NO
GA METHOD: NORMAL
GA METHOD: NORMAL
GA: NORMAL WK
HCG MOM SERPL: 1.33
HCG SERPL-ACNC: NORMAL IU/L
HX OF HEREDITARY DISORDERS: NO
IDDM PATIENT QL: NO
INHIBIN A MOM SERPL: 0.84
INHIBIN A SERPL-MCNC: 110 PG/ML
INTEGRATED SCN PATIENT-IMP: NORMAL
IVF PREGNANCY: NO
LMP START DATE: NORMAL
MONOCHORIONIC TWINS: NO
PATHOLOGY STUDY: NORMAL
PREV FETUS DEFECT: NO
SERVICE CMNT-IMP: NO
SPECIMEN DRAWN SERPL: NORMAL
U ESTRIOL MOM SERPL: 1.06
U ESTRIOL SERPL-MCNC: 1.91 NG/ML
VALPROIC/CARBAMAZEPINE STATUS: NO
WEIGHT UNITS: NORMAL

## 2020-06-01 ENCOUNTER — PRENATAL OFFICE VISIT (OUTPATIENT)
Dept: OBGYN | Facility: CLINIC | Age: 35
End: 2020-06-01
Payer: COMMERCIAL

## 2020-06-01 VITALS — SYSTOLIC BLOOD PRESSURE: 122 MMHG | BODY MASS INDEX: 41.97 KG/M2 | DIASTOLIC BLOOD PRESSURE: 74 MMHG | WEIGHT: 276 LBS

## 2020-06-01 DIAGNOSIS — O09.90 SUPERVISION OF HIGH RISK PREGNANCY, ANTEPARTUM: Primary | ICD-10-CM

## 2020-06-01 PROCEDURE — 99207 ZZC PRENATAL VISIT: CPT | Performed by: OBSTETRICS & GYNECOLOGY

## 2020-06-01 NOTE — PROGRESS NOTES
Patient presents for routine prenatal visit at 20w3d.  Patient without complaint.   PE: See OB vitals  Body mass index is 41.97 kg/m .    Doing well.  No concerns today.  No nausea/vomiting. No heartburn.  No vaginal bleeding, no contractions/severe cramping, no leakage of fluid.  No vaginal discharge. No dysuria  No headache, vision changes, lower extremity swelling, upper abdominal pain, chest pain, shortness of breath.   Screening ultrasound done but repeat needed.  Questions asked and answered.      Rafael Coppola MD FACOG

## 2020-06-25 ENCOUNTER — ANCILLARY PROCEDURE (OUTPATIENT)
Dept: ULTRASOUND IMAGING | Facility: CLINIC | Age: 35
End: 2020-06-25
Attending: OBSTETRICS & GYNECOLOGY
Payer: COMMERCIAL

## 2020-06-25 ENCOUNTER — PRENATAL OFFICE VISIT (OUTPATIENT)
Dept: OBGYN | Facility: CLINIC | Age: 35
End: 2020-06-25
Payer: COMMERCIAL

## 2020-06-25 VITALS
HEART RATE: 93 BPM | WEIGHT: 279.8 LBS | HEIGHT: 68 IN | SYSTOLIC BLOOD PRESSURE: 128 MMHG | TEMPERATURE: 98 F | DIASTOLIC BLOOD PRESSURE: 78 MMHG | BODY MASS INDEX: 42.41 KG/M2 | OXYGEN SATURATION: 99 %

## 2020-06-25 DIAGNOSIS — O09.90 SUPERVISION OF HIGH RISK PREGNANCY, ANTEPARTUM: Primary | ICD-10-CM

## 2020-06-25 DIAGNOSIS — O09.90 SUPERVISION OF HIGH RISK PREGNANCY, ANTEPARTUM: ICD-10-CM

## 2020-06-25 LAB
GLUCOSE 1H P 50 G GLC PO SERPL-MCNC: 172 MG/DL (ref 60–129)
HGB BLD-MCNC: 10.6 G/DL (ref 11.7–15.7)

## 2020-06-25 PROCEDURE — 76816 OB US FOLLOW-UP PER FETUS: CPT

## 2020-06-25 PROCEDURE — 36415 COLL VENOUS BLD VENIPUNCTURE: CPT | Performed by: OBSTETRICS & GYNECOLOGY

## 2020-06-25 PROCEDURE — 99207 ZZC PRENATAL VISIT: CPT | Performed by: OBSTETRICS & GYNECOLOGY

## 2020-06-25 PROCEDURE — 82950 GLUCOSE TEST: CPT | Performed by: OBSTETRICS & GYNECOLOGY

## 2020-06-25 PROCEDURE — 85018 HEMOGLOBIN: CPT | Performed by: OBSTETRICS & GYNECOLOGY

## 2020-06-25 ASSESSMENT — PAIN SCALES - GENERAL: PAINLEVEL: NO PAIN (0)

## 2020-06-25 ASSESSMENT — MIFFLIN-ST. JEOR: SCORE: 2017.66

## 2020-06-25 NOTE — PROGRESS NOTES
Patient presents for routine prenatal visit at 23w6d.  Patient without complaint.   PE: See OB vitals  Body mass index is 42.54 kg/m .    Doing well.  No concerns today.  No vaginal bleeding, no contractions, no leakage of fluid  No nausea/vomiting. No heartburn  No vaginal discharge. No dysuria.   No headache, vision changes, lower extremity swelling, upper abdominal pain, chest pain, shortness of breath  Tdap planned next visit    Questions asked and answered.  Ultrasound results pending  1 hour gtt today    Rafael Coppola MD FACOG

## 2020-06-26 DIAGNOSIS — Z13.1 SCREENING FOR DIABETES MELLITUS: Primary | ICD-10-CM

## 2020-07-13 ENCOUNTER — OFFICE VISIT (OUTPATIENT)
Dept: PEDIATRICS | Facility: CLINIC | Age: 35
End: 2020-07-13
Payer: COMMERCIAL

## 2020-07-13 DIAGNOSIS — Z76.81 PEDIATRIC PRE-BIRTH VISIT FOR EXPECTANT MOTHER: Primary | ICD-10-CM

## 2020-07-13 DIAGNOSIS — Z13.1 SCREENING FOR DIABETES MELLITUS: ICD-10-CM

## 2020-07-13 DIAGNOSIS — O09.90 SUPERVISION OF HIGH RISK PREGNANCY, ANTEPARTUM: ICD-10-CM

## 2020-07-13 PROCEDURE — 86780 TREPONEMA PALLIDUM: CPT | Performed by: OBSTETRICS & GYNECOLOGY

## 2020-07-13 PROCEDURE — 82952 GTT-ADDED SAMPLES: CPT | Performed by: OBSTETRICS & GYNECOLOGY

## 2020-07-13 PROCEDURE — 99207 ZZC NO BILLABLE SERVICE THIS VISIT: CPT | Performed by: PEDIATRICS

## 2020-07-13 PROCEDURE — 82951 GLUCOSE TOLERANCE TEST (GTT): CPT | Performed by: OBSTETRICS & GYNECOLOGY

## 2020-07-13 PROCEDURE — 36415 COLL VENOUS BLD VENIPUNCTURE: CPT | Performed by: OBSTETRICS & GYNECOLOGY

## 2020-07-13 NOTE — PROGRESS NOTES
Mother is here for a meet and greet.     She is 35yr old F and this is first baby. States prenatal visits going well. History of hypothyroidism which is well controlled and here also for 3 hour GTT. States blood pressure been well controlled and denies any other prenatal issues.    Would like more information about post- care when baby is born.    I spoke with the mother in detail in regards to above as well as educated in detail about feeding, voiding, stooling and SIDS as well as routine care that will be receiving in hospital which includes hep b vaccine, hearing screening, erythromycin, NBS and asking for lactation consultant if would like to breastfeed.  Also discussed about COVID-mother currently working from home.    Mother expressed understanding, had no further questions and patient was sent for GTT.

## 2020-07-14 LAB
GLUCOSE 1H P 100 G GLC PO SERPL-MCNC: 156 MG/DL (ref 60–179)
GLUCOSE 2H P 100 G GLC PO SERPL-MCNC: 152 MG/DL (ref 60–154)
GLUCOSE 3H P 100 G GLC PO SERPL-MCNC: 100 MG/DL (ref 60–139)
GLUCOSE P FAST SERPL-MCNC: 80 MG/DL (ref 60–94)
T PALLIDUM AB SER QL: NONREACTIVE

## 2020-07-15 ENCOUNTER — TELEPHONE (OUTPATIENT)
Dept: FAMILY MEDICINE | Facility: CLINIC | Age: 35
End: 2020-07-15

## 2020-07-15 NOTE — TELEPHONE ENCOUNTER
Patient is asking about her Glucose tolerance screen results.   Please call Yvonne to discuss.     Nevin Love RN BSN

## 2020-07-23 ENCOUNTER — PRENATAL OFFICE VISIT (OUTPATIENT)
Dept: OBGYN | Facility: CLINIC | Age: 35
End: 2020-07-23
Payer: COMMERCIAL

## 2020-07-23 VITALS
HEIGHT: 68 IN | DIASTOLIC BLOOD PRESSURE: 76 MMHG | TEMPERATURE: 98.3 F | BODY MASS INDEX: 42.53 KG/M2 | HEART RATE: 114 BPM | OXYGEN SATURATION: 95 % | SYSTOLIC BLOOD PRESSURE: 125 MMHG | WEIGHT: 280.6 LBS

## 2020-07-23 DIAGNOSIS — O09.90 SUPERVISION OF HIGH RISK PREGNANCY, ANTEPARTUM: Primary | ICD-10-CM

## 2020-07-23 PROCEDURE — 99207 ZZC PRENATAL VISIT: CPT | Performed by: OBSTETRICS & GYNECOLOGY

## 2020-07-23 ASSESSMENT — PAIN SCALES - GENERAL: PAINLEVEL: NO PAIN (0)

## 2020-07-23 ASSESSMENT — MIFFLIN-ST. JEOR: SCORE: 2021.29

## 2020-07-23 NOTE — PROGRESS NOTES
Patient presents for routine prenatal visit at 27w6d.  Patient without complaint.   PE: See OB vitals  Body mass index is 42.67 kg/m .    Doing well.  No concerns today.  No vaginal bleeding, loss of fluid, or contractions    Questions asked and answered.      Rafael Coppola MD FACOG

## 2020-07-23 NOTE — PATIENT INSTRUCTIONS
If you have any questions regarding your visit, Please contact your care team.    [employer] website Services: [phone]      Women s Health CLINIC HOURS TELEPHONE NUMBER   MD Malinda Hernández CMA Susie -    SYED [place]       Monday:       7:30-4:15 [place]  Wednesday:      Administrative  Thursday:       7:30-4:15 [place]  Friday:       Administrative     [facility]  [address] [place]  [address]  [phone] [facility]  [address] [place] [address]  [phone] ask for [facility]    Imaging Scheduling for [place]:  [phone]    Imaging Scheduling for [place]: [phone]       Urgent Care locations:    [facility] Saturday and Sunday   9 am - 5 pm    Monday-Friday   12 pm - 8 pm  Saturday and Sunday   9 am - 5 pm   [phone]     [facility] Labor and Delivery:  [phone]    If you need a medication refill, please contact your pharmacy. Please allow 3 business days for your refill to be completed.  As always, Thank you for trusting us with your healthcare needs!

## 2020-08-06 ENCOUNTER — PRENATAL OFFICE VISIT (OUTPATIENT)
Dept: OBGYN | Facility: CLINIC | Age: 35
End: 2020-08-06
Payer: COMMERCIAL

## 2020-08-06 VITALS
SYSTOLIC BLOOD PRESSURE: 118 MMHG | BODY MASS INDEX: 43.73 KG/M2 | HEART RATE: 89 BPM | DIASTOLIC BLOOD PRESSURE: 74 MMHG | WEIGHT: 287.6 LBS

## 2020-08-06 DIAGNOSIS — Z23 NEED FOR TDAP VACCINATION: Primary | ICD-10-CM

## 2020-08-06 PROCEDURE — 99207 ZZC PRENATAL VISIT: CPT | Performed by: OBSTETRICS & GYNECOLOGY

## 2020-08-06 PROCEDURE — 90715 TDAP VACCINE 7 YRS/> IM: CPT | Performed by: OBSTETRICS & GYNECOLOGY

## 2020-08-06 PROCEDURE — 90471 IMMUNIZATION ADMIN: CPT | Performed by: OBSTETRICS & GYNECOLOGY

## 2020-08-06 RX ORDER — CALCIUM POLYCARBOPHIL 625 MG
TABLET ORAL DAILY
COMMUNITY

## 2020-08-06 NOTE — PROGRESS NOTES
Patient presents for routine prenatal visit at 29w6d.  Patient without complaint.   PE: See OB vitals  Body mass index is 43.73 kg/m .    Doing well.  No concerns today.  No nausea/vomiting. No heartburn.  No vaginal bleeding, no contractions/severe cramping, no leakage of fluid.  No vaginal discharge. No dysuria  No headache, vision changes, lower extremity swelling, upper abdominal pain, chest pain, shortness of breath.   Questions asked and answered.      Rafael Coppola MD FACOG

## 2020-08-06 NOTE — PROGRESS NOTES
Prior to immunization administration, verified patients identity using patient s name and date of birth. Please see Immunization Activity for additional information.     Screening Questionnaire for Adult Immunization    Are you sick today?   No   Do you have allergies to medications, food, a vaccine component or latex?   No   Have you ever had a serious reaction after receiving a vaccination?   No   Do you have a long-term health problem with heart, lung, kidney, or metabolic disease (e.g., diabetes), asthma, a blood disorder, no spleen, complement component deficiency, a cochlear implant, or a spinal fluid leak?  Are you on long-term aspirin therapy?   No   Do you have cancer, leukemia, HIV/AIDS, or any other immune system problem?   No   Do you have a parent, brother, or sister with an immune system problem?   No   In the past 3 months, have you taken medications that affect  your immune system, such as prednisone, other steroids, or anticancer drugs; drugs for the treatment of rheumatoid arthritis, Crohn s disease, or psoriasis; or have you had radiation treatments?   No   Have you had a seizure, or a brain or other nervous system problem?   No   During the past year, have you received a transfusion of blood or blood    products, or been given immune (gamma) globulin or antiviral drug?   No   For women: Are you pregnant or is there a chance you could become       pregnant during the next month?   Yes   Have you received any vaccinations in the past 4 weeks?   No     Immunization questionnaire was positive for at least one answer.  Notified [place].        Per orders of Dr. Coppola, injection of Tdap given by Sarahy Tolliver MA. Patient instructed to remain in clinic for 15 minutes afterwards, and to report any adverse reaction to me immediately.       Screening performed by Sarahy Tolliver MA on 8/6/2020 at 3:33 PM.

## 2020-08-21 ENCOUNTER — PRENATAL OFFICE VISIT (OUTPATIENT)
Dept: OBGYN | Facility: CLINIC | Age: 35
End: 2020-08-21
Payer: COMMERCIAL

## 2020-08-21 VITALS
DIASTOLIC BLOOD PRESSURE: 74 MMHG | SYSTOLIC BLOOD PRESSURE: 116 MMHG | WEIGHT: 289.2 LBS | BODY MASS INDEX: 43.83 KG/M2 | HEART RATE: 101 BPM | HEIGHT: 68 IN | TEMPERATURE: 98.5 F | OXYGEN SATURATION: 97 %

## 2020-08-21 DIAGNOSIS — O09.90 SUPERVISION OF HIGH RISK PREGNANCY, ANTEPARTUM: Primary | ICD-10-CM

## 2020-08-21 PROCEDURE — 99207 ZZC PRENATAL VISIT: CPT | Performed by: NURSE PRACTITIONER

## 2020-08-21 ASSESSMENT — MIFFLIN-ST. JEOR: SCORE: 2060.3

## 2020-08-21 ASSESSMENT — PAIN SCALES - GENERAL: PAINLEVEL: NO PAIN (0)

## 2020-08-21 NOTE — PROGRESS NOTES
Patient presents for routine prenatal visit. Prenatal flowsheet reviewed and updated as needed.  Denies vaginal bleeding, loss of fluid, contractions or cramping. Denies headache, nausea/vomiting, upper abdominal pain, vision changes, lower extremity swelling, chest pain or shortness of breath.  Patient without complaint.   Advice as per Anticipatory Guidance/Checklist updated.  PE: See OB vitals    Questions asked and answered. Next OB visit in 2 week(s) with Dr. Coppola.    Meli SIDDIQUI CNP

## 2020-09-03 ENCOUNTER — PRENATAL OFFICE VISIT (OUTPATIENT)
Dept: OBGYN | Facility: CLINIC | Age: 35
End: 2020-09-03
Payer: COMMERCIAL

## 2020-09-03 VITALS
SYSTOLIC BLOOD PRESSURE: 122 MMHG | HEART RATE: 102 BPM | DIASTOLIC BLOOD PRESSURE: 74 MMHG | WEIGHT: 293 LBS | TEMPERATURE: 99.2 F | BODY MASS INDEX: 44.55 KG/M2

## 2020-09-03 DIAGNOSIS — O09.90 SUPERVISION OF HIGH RISK PREGNANCY, ANTEPARTUM: Primary | ICD-10-CM

## 2020-09-03 PROCEDURE — 99207 ZZC PRENATAL VISIT: CPT | Performed by: OBSTETRICS & GYNECOLOGY

## 2020-09-03 NOTE — PROGRESS NOTES
Patient presents for routine prenatal visit at 33w6d.  Patient without complaint.   PE: See OB vitals  Body mass index is 44.55 kg/m .    Doing well.  No concerns today.  No vaginal bleeding, loss of fluid, or contractions    Questions asked and answered.      Rafael Coppola MD FACOG

## 2020-09-03 NOTE — PATIENT INSTRUCTIONS
If you have any questions regarding your visit, Please contact your care team.    [employer] Services: [phone]      Women s Health CLINIC HOURS TELEPHONE NUMBER   MD Malinda Hernández CMA Susie -    SYED [place]       Monday:       7:30-4:15 [place]  Wednesday:      Administrative  Thursday:       7:30-4:15 [place]  Friday:       Administrative     [facility]  [address] [place]  [address]  [phone] ask for [facility]  [address] [place] [address]  [phone] ask for [facility]    Imaging Scheduling for [place]:  [phone]    Imaging Scheduling for [place]: [phone]       Urgent Care locations:    [facility] Saturday and Sunday   9 am - 5 pm    Monday-Friday   12 pm - 8 pm  Saturday and Sunday   9 am - 5 pm   [phone]     [facility] Labor and Delivery:  [phone]    If you need a medication refill, please contact your pharmacy. Please allow 3 business days for your refill to be completed.  As always, Thank you for trusting us with your healthcare needs!

## 2020-09-17 ENCOUNTER — PRENATAL OFFICE VISIT (OUTPATIENT)
Dept: OBGYN | Facility: CLINIC | Age: 35
End: 2020-09-17
Payer: COMMERCIAL

## 2020-09-17 VITALS
SYSTOLIC BLOOD PRESSURE: 116 MMHG | HEIGHT: 68 IN | DIASTOLIC BLOOD PRESSURE: 84 MMHG | BODY MASS INDEX: 44.41 KG/M2 | HEART RATE: 77 BPM | TEMPERATURE: 97.3 F | WEIGHT: 293 LBS | OXYGEN SATURATION: 97 %

## 2020-09-17 DIAGNOSIS — O09.90 SUPERVISION OF HIGH RISK PREGNANCY, ANTEPARTUM: Primary | ICD-10-CM

## 2020-09-17 DIAGNOSIS — Z23 NEED FOR PROPHYLACTIC VACCINATION AND INOCULATION AGAINST INFLUENZA: ICD-10-CM

## 2020-09-17 PROCEDURE — 90686 IIV4 VACC NO PRSV 0.5 ML IM: CPT | Performed by: NURSE PRACTITIONER

## 2020-09-17 PROCEDURE — 90471 IMMUNIZATION ADMIN: CPT | Performed by: NURSE PRACTITIONER

## 2020-09-17 PROCEDURE — 99207 ZZC PRENATAL VISIT: CPT | Performed by: NURSE PRACTITIONER

## 2020-09-17 ASSESSMENT — MIFFLIN-ST. JEOR: SCORE: 2098.4

## 2020-09-17 ASSESSMENT — PAIN SCALES - GENERAL: PAINLEVEL: NO PAIN (0)

## 2020-09-17 NOTE — PROGRESS NOTES
Patient presents for routine prenatal visit. Prenatal flowsheet reviewed and updated as needed.  Denies vaginal bleeding, loss of fluid, contractions or cramping. Denies headache, nausea/vomiting, upper abdominal pain, vision changes, lower extremity swelling, chest pain or shortness of breath.  Patient without complaint. GBS on return to clinic.  We discussed seasonal influenza vaccination and recommendations. At this time, patient accepts vaccination.    Advice as per Anticipatory Guidance/Checklist updated.  PE: See OB vitals    Questions asked and answered. Next OB visit in 1 week(s) with Dr. Coppola.    Meli SIDDIQUI CNP

## 2020-09-24 ENCOUNTER — PRENATAL OFFICE VISIT (OUTPATIENT)
Dept: OBGYN | Facility: CLINIC | Age: 35
End: 2020-09-24
Payer: COMMERCIAL

## 2020-09-24 VITALS
TEMPERATURE: 98.7 F | OXYGEN SATURATION: 98 % | SYSTOLIC BLOOD PRESSURE: 119 MMHG | WEIGHT: 293 LBS | HEIGHT: 68 IN | BODY MASS INDEX: 44.41 KG/M2 | DIASTOLIC BLOOD PRESSURE: 87 MMHG | HEART RATE: 101 BPM

## 2020-09-24 DIAGNOSIS — O09.90 SUPERVISION OF HIGH RISK PREGNANCY, ANTEPARTUM: Primary | ICD-10-CM

## 2020-09-24 PROCEDURE — 87653 STREP B DNA AMP PROBE: CPT | Performed by: NURSE PRACTITIONER

## 2020-09-24 PROCEDURE — 99207 ZZC PRENATAL VISIT: CPT | Performed by: NURSE PRACTITIONER

## 2020-09-24 ASSESSMENT — MIFFLIN-ST. JEOR: SCORE: 2091.15

## 2020-09-24 ASSESSMENT — PAIN SCALES - GENERAL: PAINLEVEL: NO PAIN (0)

## 2020-09-24 NOTE — PROGRESS NOTES
Patient presents for routine prenatal visit. Prenatal flowsheet reviewed and updated as needed.  Denies vaginal bleeding, loss of fluid, contractions or cramping. Denies headache, nausea/vomiting, upper abdominal pain, vision changes, lower extremity swelling, chest pain or shortness of breath.    Patient without complaint. GBS done. Ultrasound for fetal position on return to clinic.   Reviewed signs and symptoms of labor and when to proceed to L&D.  Advice as per Anticipatory Guidance/Checklist updated.  PE: See OB vitals    Questions asked and answered. Next OB visit in 1 week(s) with Dr. Coppola.    Meli SIDDIQUI CNP

## 2020-09-25 LAB
GP B STREP DNA SPEC QL NAA+PROBE: NEGATIVE
SPECIMEN SOURCE: NORMAL

## 2020-10-01 ENCOUNTER — PRENATAL OFFICE VISIT (OUTPATIENT)
Dept: OBGYN | Facility: CLINIC | Age: 35
End: 2020-10-01
Payer: COMMERCIAL

## 2020-10-01 VITALS
BODY MASS INDEX: 46.01 KG/M2 | WEIGHT: 293 LBS | SYSTOLIC BLOOD PRESSURE: 127 MMHG | HEART RATE: 93 BPM | DIASTOLIC BLOOD PRESSURE: 85 MMHG

## 2020-10-01 DIAGNOSIS — O09.90 SUPERVISION OF HIGH RISK PREGNANCY, ANTEPARTUM: Primary | ICD-10-CM

## 2020-10-01 PROCEDURE — 99207 PR PRENATAL VISIT: CPT | Performed by: OBSTETRICS & GYNECOLOGY

## 2020-10-01 NOTE — PROGRESS NOTES
Patient presents for routine prenatal visit at 37w6d.  Patient without complaint.   PE: See OB vitals  Body mass index is 46.01 kg/m .  Doing well.  No concerns today.  No vaginal bleeding, LOF, contractions.  No HA, RUQ pain, N/V, visual changes.    Transabdominal ultrasound was performed to determine presentation.  A viable intrauterine pregnancy was seen.  The fetus is noted in VERTEX .  Fetal heart motion was visualized at 136 bpm.    Normal Amniotic Fluid Volume is present.  Questions asked and answered.    Rafael Coppola MD FACOG

## 2020-10-08 ENCOUNTER — PRENATAL OFFICE VISIT (OUTPATIENT)
Dept: OBGYN | Facility: CLINIC | Age: 35
End: 2020-10-08
Payer: COMMERCIAL

## 2020-10-08 VITALS
OXYGEN SATURATION: 98 % | BODY MASS INDEX: 44.41 KG/M2 | WEIGHT: 293 LBS | DIASTOLIC BLOOD PRESSURE: 84 MMHG | SYSTOLIC BLOOD PRESSURE: 131 MMHG | HEIGHT: 68 IN | HEART RATE: 101 BPM

## 2020-10-08 DIAGNOSIS — O09.90 SUPERVISION OF HIGH RISK PREGNANCY, ANTEPARTUM: Primary | ICD-10-CM

## 2020-10-08 PROCEDURE — 99207 PR PRENATAL VISIT: CPT | Performed by: OBSTETRICS & GYNECOLOGY

## 2020-10-08 ASSESSMENT — PAIN SCALES - GENERAL: PAINLEVEL: NO PAIN (0)

## 2020-10-08 ASSESSMENT — MIFFLIN-ST. JEOR: SCORE: 2124.71

## 2020-10-08 NOTE — PROGRESS NOTES
Patient presents for routine prenatal visit at 38w6d.  Patient without complaint.   PE: See OB vitals  Body mass index is 46.13 kg/m .  Doing well.  No concerns today.  No vaginal bleeding, LOF, contractions.  No HA, RUQ pain, N/V, visual changes.  Questions asked and answered.      Rafael Coppola MD FACOG

## 2020-10-12 ENCOUNTER — NURSE TRIAGE (OUTPATIENT)
Dept: NURSING | Facility: CLINIC | Age: 35
End: 2020-10-12

## 2020-10-12 NOTE — TELEPHONE ENCOUNTER
39w3d   Dr. Coppola    Couple of bloody shows with mucous  -2 of these  -then one episode of blood in the toilet   --gathered at the bottom of toilet  -blood the size of 50 cent piece on pad    She has been having abdominal Pressure and cramps  Stomach is becoming hard with each cramp  Rates 6/10  2 in last 25 min  *Thought they were wendy calle    Patient also thought she may have had some leakage yesterday. Went to the bathroom, and then when she stood up there was a lot of extra fluid coming down.     Triaged to a disposition of Go to L&D. Patient is agreeable. Will be there in a half hour. Notified L&D    COVID 19 Nurse Triage Plan/Patient Instructions    Please be aware that novel coronavirus (COVID-19) may be circulating in the community. If you develop symptoms such as fever, cough, or SOB or if you have concerns about the presence of another infection including coronavirus (COVID-19), please contact your health care provider or visit [email or web address].     Disposition/Instructions    ED Visit recommended. Follow protocol based instructions.     Bring Your Own Device:  Please also bring your smart device(s) (smart phones, tablets, laptops) and their charging cables for your personal use and to communicate with your care team during your visit.    Thank you for taking steps to prevent the spread of this virus.  o Limit your contact with others.  o Wear a simple mask to cover your cough.  o Wash your hands well and often.    Resources    [facility]: About COVID-19: [email or web address]    CDC: What to Do If You're Sick: www.cdc.gov/coronavirus/2019-ncov/about/steps-when-sick.html    CDC: Ending Home Isolation: www.cdc.gov/coronavirus/2019-ncov/hcp/disposition-in-home-patients.html     CDC: Caring for Someone: www.cdc.gov/coronavirus/2019-ncov/if-you-are-sick/care-for-someone.html     [facility]: Interim Guidance for Hospital Discharge to Home:  "www.health.[facility].[place]/diseases/coronavirus/hcp/hospdischarge.pdf    [facility] clinical trials (COVID-19 research studies): clinicalaffairs.[facility]/umn-clinical-trials     Below are the COVID-19 hotlines at the [place] Department of Health ([facility]). Interpreters are available.   o For health questions: Call 358-476-7035 or 1-559.544.6801 (7 a.m. to 7 p.m.)  o For questions about schools and childcare: Call 736-852-9502 or 1-650.186.3909 (7 a.m. to 7 p.m.)     Reason for Disposition    Vaginal bleeding or spotting    Additional Information    Negative: Passed out (i.e., lost consciousness, collapsed and was not responding)    Negative: Shock suspected (e.g., cold/pale/clammy skin, too weak to stand, low BP, rapid pulse)    Negative: Difficult to awaken or acting confused (e.g., disoriented, slurred speech)    Negative: [1] SEVERE abdominal pain (e.g., excruciating) AND [2] constant AND [3] present > 1 hour    Negative: Severe bleeding (e.g., continuous red blood from vagina, or large blood clots)    Negative: Umbilical cord hanging out of the vagina (shiny, white, curled appearance, \"like telephone cord\")    Negative: Uncontrollable urge to push (i.e., feels like baby is coming out now)    Negative: Can see baby    Negative: Sounds like a life-threatening emergency to the triager    Negative: Pregnant < 37 weeks (i.e., )    Negative: [1] Uncertain delivery date AND [2] possibly pregnant < 37 weeks (i.e., )    Negative: [1] First baby (primipara) AND [2] contractions < 6 minutes apart  AND [3] present 2 hours    Negative: [1] History of prior delivery (multipara) AND [2] contractions < 10 minutes apart AND [3] present 1 hour    Negative: [1] History of rapid prior delivery AND [2] contractions < 10 minutes apart    Negative: [1] Leakage of fluid from vagina AND [2] green or brown in color    Negative: [1] Leakage of fluid from vagina AND [2] leakage started > 4 hours ago    Protocols used: " PREGNANCY - LABOR-A-    Sherly Dowell RN on 10/12/2020 at 2:33 AM

## 2020-10-15 ENCOUNTER — TELEPHONE (OUTPATIENT)
Dept: OBGYN | Facility: CLINIC | Age: 35
End: 2020-10-15

## 2020-10-15 ENCOUNTER — MYC MEDICAL ADVICE (OUTPATIENT)
Dept: OBGYN | Facility: CLINIC | Age: 35
End: 2020-10-15

## 2020-10-15 NOTE — TELEPHONE ENCOUNTER
Reason for Call:  Form, our goal is to have forms completed with 72 hours, however, some forms may require a visit or additional information.    Type of letter, form or note:  FMLA    Who is the form from?: Insurance comp    Where did the form come from: form was faxed in    What clinic location was the form placed at?: [place]    Where the form was placed: Box    What number is listed as a contact on the form?: Fax to [phone]       Additional comments: Forms completed. Waiting for MD signature. Sent patient [employer] message that forms were received.    Call taken on 10/15/2020 at 2:49 PM by Nya Shankar CMA

## 2020-10-15 NOTE — TELEPHONE ENCOUNTER
Yvonne Jordan  Patient Customer Service Request Pool 33 minutes ago (12:51 PM)        Topic: Procedural Question     Good afternoon,      I recently had forms sent over to Dr Coppola to fill out for my FMLA and short term disability in regards to my maternity leave. I just wanted to double check that those were received and if they could be filled out ASAP. Please fax back to [facility] at [facility] who sent forms over, she said she put her fax number on there as well but if not let me know. Thank you so much.

## 2020-10-19 ENCOUNTER — MYC MEDICAL ADVICE (OUTPATIENT)
Dept: OBGYN | Facility: CLINIC | Age: 35
End: 2020-10-19

## 2020-10-19 NOTE — TELEPHONE ENCOUNTER
Reason for Call:  Form, our goal is to have forms completed with 72 hours, however, some forms may require a visit or additional information.    Type of letter, form or note:  FMLA    Who is the form from?: Patient    Where did the form come from: form was faxed in    What clinic location was the form placed at?: [place]    Where the form was placed: Dr Coppola Box/Folder    What number is listed as a contact on the form?: Fax [phone]        Additional comments: forms are completed and placed in Dr oCppola box for signature. Sent message to patient     Call taken on 10/19/2020 at 2:56 PM by Rachel Frey [facility]

## 2020-10-22 NOTE — TELEPHONE ENCOUNTER
FMLA forms faxed to [facility]  [phone]. Copy sent to scanning.

## 2020-10-22 NOTE — TELEPHONE ENCOUNTER
Forms placed on Dr. Coppola desk for signature. Will fax it to [place] as soon as it gets signed.     Erna Villalobos, CMA

## 2020-10-22 NOTE — TELEPHONE ENCOUNTER
Forms signed and faxed to [employer] at [phone].    [employer] message sent to pt informing her of this

## 2020-11-09 ENCOUNTER — PATIENT OUTREACH (OUTPATIENT)
Dept: FAMILY MEDICINE | Facility: CLINIC | Age: 35
End: 2020-11-09

## 2020-11-09 DIAGNOSIS — D06.9 CIN III (CERVICAL INTRAEPITHELIAL NEOPLASIA III): ICD-10-CM

## 2020-11-14 ENCOUNTER — MYC MEDICAL ADVICE (OUTPATIENT)
Dept: OBGYN | Facility: CLINIC | Age: 35
End: 2020-11-14

## 2020-11-16 NOTE — TELEPHONE ENCOUNTER
Spoke to Dr Coppola, unfortunately, he cannot give short term disability for wrist, she would need to be seen by ortho and hey would determine this. FMLA is standard time 6-12 weeks, cannot be extended.   Called patient and left message to call back. There are no forms here in [facility].     Rachel Frey, CMA

## 2020-11-23 ENCOUNTER — PRENATAL OFFICE VISIT (OUTPATIENT)
Dept: OBGYN | Facility: CLINIC | Age: 35
End: 2020-11-23
Payer: COMMERCIAL

## 2020-11-23 VITALS
SYSTOLIC BLOOD PRESSURE: 137 MMHG | DIASTOLIC BLOOD PRESSURE: 82 MMHG | WEIGHT: 270.2 LBS | OXYGEN SATURATION: 99 % | HEART RATE: 83 BPM | BODY MASS INDEX: 41.08 KG/M2

## 2020-11-23 DIAGNOSIS — B97.7 HIGH RISK HPV INFECTION: Primary | ICD-10-CM

## 2020-11-23 PROCEDURE — 87624 HPV HI-RISK TYP POOLED RSLT: CPT | Performed by: OBSTETRICS & GYNECOLOGY

## 2020-11-23 PROCEDURE — 99207 PR POST PARTUM EXAM: CPT | Performed by: OBSTETRICS & GYNECOLOGY

## 2020-11-23 ASSESSMENT — PAIN SCALES - GENERAL: PAINLEVEL: NO PAIN (0)

## 2020-11-23 NOTE — PROGRESS NOTES
Yvonne is here for a postpartum checkup.    She had a   OB History    Para Term  AB Living   1 0 0 0 0 0   SAB TAB Ectopic Multiple Live Births   0 0 0 0 0      # Outcome Date GA Lbr [place]/2nd Weight Sex Delivery Anes PTL Lv   1                Since delivery, she has not been breast feeding.  She has not had a normal menses.  She has not had intercourse.  Patient screened for postpartum depression and complaints are NEGATIVE. Screening has also been completed for intimate partner violence.      PE: /82 (BP Location: Right arm, Patient Position: Chair, Cuff Size: Adult Large)   Pulse 83   Wt 122.6 kg (270 lb 3.2 oz)   LMP 01/10/2020 (Exact Date)   SpO2 99%   Breastfeeding No   BMI 41.08 kg/m    Body mass index is 41.08 kg/m .    General Appearance:  healthy, alert, active, no distress  Cardiovascular:  Regular rate and Rhythm  Lungs:  Clear, without wheeze, rale or rhonchi  Abdomen: Benign, Soft, flat, non-tender, No masses, organomegaly, No inguinal nodes and Bowel sounds normoactive.   Pelvic:       - Ext: Vulva and perineum are normal without lesion, mass or discharge        - Bladder: no tenderness, no masses       - Vagina: Normal mucosa, no discharge       - Cervix: multiparous       - Uterus:Normal shape, position and consistency       - Adnexa: Normal without masses or tenderness       - Rectal: deferred    A/P  Routine Postpartum     - I discussed the new pap recommendations regarding screening.  Explained the rationale for increased intervals between paps.  Questions asked and answered.  She does agree to this regiment.   - Pap was performed   - Contraception: declines

## 2020-11-23 NOTE — LETTER
[facility]  [address] [id number] [phone]    Yvonne Jordan    To whom it may concern:    Yvonne is under our care for her pregnancy.  She delivered 10/12/2020 and is seen today for postpartum care.  She has recovered well from recovery and physically may return to work, without restrictions.    While her recovery has gone well, there have been some challenges with the baby's transition.  These may present some challenges with her return to work.    Rafael Coppola MD FACOG  November 23, 2020

## 2020-11-27 LAB
COPATH REPORT: NORMAL
PAP: NORMAL

## 2020-12-01 ENCOUNTER — PATIENT OUTREACH (OUTPATIENT)
Dept: OBGYN | Facility: CLINIC | Age: 35
End: 2020-12-01

## 2020-12-01 NOTE — TELEPHONE ENCOUNTER
12/2005:Pap--ASC-US +HPV  1/2006:[place]--HSIL, NATHAN 2-3  2/2006:[facility]--HSIL  7/2006:[place]--NATHAN 1  2/2007:Pap--LSIL  3/2007:[place] 8/2007:Pap--LSIL  10/2007:Pap--LSIL  12/2007:[place]--Negative. Rpt in 6 mon  8/2008:Pap--LSIL  12/21/09:Pap--NIL. Rpt in 6 mon.  9/29/10:Pap--NIL. Rpt in 6 mon.  7/28/11:Pap--NIL. Rpt in one year.    2/16/12: Pap--NIL. Repeat pap in 1 year. Pap tracking episode resolved.  10/31/14: ASCUS Pap, + HR HPV. ([facility])  12/31/14: [place] Bx - NATHAN 1, ECC - negative ([facility])  8/28/15: Pap - NIL, + HR HPV. Plan cotest in 1 year.  11/25/16: NIL Pap, + HR HPV. Plan colp  2/2/17: [place] Bx & ECC - negative. Plan cotest in 1 year.   12/22/17 NIL pap, + HR HPV (not 16/18). Plan colp per provider   4/12/18 [place] - visually normal. Plan cotest in 1 year from pap date due by 12/22/18  1/15/19 Lost to follow-up for pap tracking  3/25/19 NIL pap,  Neg HPV. Plan cotest in 3 years  3/26/20 NIL pap, + HR HPV (not 16 or 18) Plan: [place] at next OB Check, Per Dr. Hu.   4/13/20 Per Dr. Coppola, cotest postpartum, JOHN: 10/16/20.  10/12/20 Pt delivered, cotest due 11/23/20.   11/9/20 My Chart Reminder Sent.  11/23/20 NIL Pap, + HR HPV (neg 16/18). [place] due by 2/23/2021

## 2021-01-25 ENCOUNTER — OFFICE VISIT (OUTPATIENT)
Dept: ORTHOPEDICS | Facility: CLINIC | Age: 36
End: 2021-01-25
Payer: COMMERCIAL

## 2021-01-25 ENCOUNTER — OFFICE VISIT (OUTPATIENT)
Dept: OBGYN | Facility: CLINIC | Age: 36
End: 2021-01-25
Payer: COMMERCIAL

## 2021-01-25 VITALS
DIASTOLIC BLOOD PRESSURE: 84 MMHG | SYSTOLIC BLOOD PRESSURE: 140 MMHG | BODY MASS INDEX: 41.22 KG/M2 | HEIGHT: 68 IN | HEART RATE: 99 BPM | WEIGHT: 272 LBS

## 2021-01-25 VITALS
WEIGHT: 276 LBS | DIASTOLIC BLOOD PRESSURE: 87 MMHG | BODY MASS INDEX: 41.97 KG/M2 | HEART RATE: 82 BPM | SYSTOLIC BLOOD PRESSURE: 164 MMHG

## 2021-01-25 DIAGNOSIS — Z32.00 PREGNANCY EXAMINATION OR TEST, PREGNANCY UNCONFIRMED: Primary | ICD-10-CM

## 2021-01-25 DIAGNOSIS — G56.03 BILATERAL CARPAL TUNNEL SYNDROME: Primary | ICD-10-CM

## 2021-01-25 DIAGNOSIS — B97.7 HIGH RISK HPV INFECTION: ICD-10-CM

## 2021-01-25 PROCEDURE — 99203 OFFICE O/P NEW LOW 30 MIN: CPT | Performed by: ORTHOPAEDIC SURGERY

## 2021-01-25 PROCEDURE — 99213 OFFICE O/P EST LOW 20 MIN: CPT | Mod: 25 | Performed by: OBSTETRICS & GYNECOLOGY

## 2021-01-25 PROCEDURE — 57455 BIOPSY OF CERVIX W/SCOPE: CPT | Performed by: OBSTETRICS & GYNECOLOGY

## 2021-01-25 PROCEDURE — 88305 TISSUE EXAM BY PATHOLOGIST: CPT | Performed by: PATHOLOGY

## 2021-01-25 ASSESSMENT — PAIN SCALES - GENERAL: PAINLEVEL: MODERATE PAIN (5)

## 2021-01-25 ASSESSMENT — MIFFLIN-ST. JEOR: SCORE: 1982.28

## 2021-01-25 NOTE — LETTER
1/25/2021         RE: Yvonne Jordan  [id number] [facility] [id number]        Dear Colleague,    Thank you for referring your patient, Yvonne Jordan, to the [facility]. Please see a copy of my visit note below.    CHIEF COMPLAINT:   Chief Complaint   Patient presents with     Wrist Pain     Bilateral wrist pain. Onset: 6 months. She noticed it when she was pregnant but it has gotten worse. At night she will have numbness and tingling. Pain is on the volar wrist and will radiate into the thumbs. She does have some OTC wrist braces but they only help for a little bit. She has been taking tylenol. No tx. She has only wore the braces at times.          HISTORY:  Yvonne Jordan is a 35 year old female , left-hand dominant who is seen in for Bilateral hand numbness and tingling and pain x 6+ months, R = L. Onset during pregnancy but has worsened. Has numbness and tingling worse at night. Locates pain in the palmar aspect of the wrist and into the thumbs.  The symptoms have been constant, and not helped by over the counter braces she wears occasionally the past couple of months.  Has not tried a injections.  she has numbness and tingling in all digits, but can even go up the arms.  Other symptoms include pain up arm.     Aggravated with taking care of baby, using keyboard for work.    Suspected cause: Due to pregnancy  Pain severity: 5/10  Pain quality: dull, aching and sharp  Frequency of symptoms: are constant.  Aggravating Factors: activities.  Relieving Factors: tylenol, wrist brace.  Previous modalities tried: wrist brace from [employer].  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    Medications:   Current Outpatient Medications:      Calcium Polycarbophil (FIBER) 625 MG tablet, Take by mouth daily, Disp: , Rfl:      levothyroxine (SYNTHROID/LEVOTHROID) 88 MCG tablet, TAKE ONE TABLET BY MOUTH ONCE DAILY . (NEED TO BE SEEN IN CLINIC FOR FURTHER REFILLS), Disp: 30 tablet, Rfl: 10     Prenatal Vit-Fe Fumarate-FA (PRENATAL VITAMIN PO), , Disp: , Rfl:     Allergies:   Allergies   Allergen Reactions     Nkda [No Known Drug Allergies]        Social Hx: desk job.  reports that she has never smoked. She has never used smokeless tobacco. She reports previous alcohol use. She reports that she does not use drugs.    Family Hx: family history includes Arthritis in her mother; Cancer in her maternal grandmother, paternal grandfather, and paternal grandmother; Diabetes in her father; Hypertension in her father and maternal grandmother; Lung Cancer in her paternal grandmother; Thyroid Disease in her mother..    REVIEW OF SYSTEMS: 10 point ROS neg other than the symptoms noted above in the HPI and PMH. Notables include  CONSTITUTIONAL:NEGATIVE for fever, chills, change in weight  INTEGUMENTARY/SKIN: NEGATIVE for worrisome rashes, moles or lesions  MUSCULOSKELETAL:See HPI  "above  Neurology: see HPI above.      EXAM:  BP (!) 140/84   Pulse 99   Ht 1.727 m (5' 8\")   Wt 123.4 kg (272 lb)   BMI 41.36 kg/m    GENERAL APPEARANCE: healthy, alert and no distress   GAIT: NORMAL  SKIN: no suspicious lesions or rashes  RESPIRATORY: No increased work of breathing.  NEURO:     strength: decreased,    thenar fasiculations: negative    Thenar atrophy: negative .    Sensation intact in  all digits,    reflexes normal in upper extremities.   PSYCH:  mentation appears normal and affect normal, not anxious.    MUSCULOSKELETAL:    RIGHT HAND/FINGERS:    Skin intact. No abnormal skin discoloration, erythema or ecchymosis.   No nail pitting or clubbing.  Normal wear pattern, color and tone.  No observable or palpable masses of the fingers or palm or wrist.  No palpable triggering of fingers.   No observable or palpable cords or nodules of the fingers or palm.    There is no swelling in the wrist, hand, forearm.  There is mild tenderness in the volar wrist.  There is no ecchymosis.  There is no erythema of the surrounding skin.  There is no maceration of the skin.  There is no deformity in the area.  Intact extensors. No extensor lag.    Special tests wrist:    Tinel's positive,    Phalen's positive.    Flexion/compression test positive.   Finkelstein's test: positive, mild   Ulnar piano sign: negative   Ulnar foveal tenderness:  negative    Special tests medial elbow ulnar nerve:    Tinel's negative,    Flexion/compression test negative.    Special tests median nerve proximal forearm:    Tinel's negative.    1st carpometacarpal grind: negative    Intact sensation light touch median, radial, ulnar nerves of the hand  Intact sensation to the radial and ulnar digital nerves of the fingers, as well as the finger tips.  Intact epl fpl fdp edc wrist flexion/extension biceps/triceps deltoid  Brisk capillary refill to all fingers.   Palpable radial pulse, 2+.      LEFT HAND/FINGERS:    Skin intact. No " abnormal skin discoloration, erythema or ecchymosis.   No nail pitting or clubbing.  Normal wear pattern, color and tone.  No observable or palpable masses of the fingers or palm or wrist.  No palpable triggering of fingers.   No observable or palpable cords or nodules of the fingers or palm.    There is no swelling in the wrist, hand, forearm.  There is mild tenderness in the wrist.  There is no ecchymosis.  There is no erythema of the surrounding skin.  There is no maceration of the skin.  There is no deformity in the area.  Intact extensors. No extensor lag.    Special tests wrist:    Tinel's equivocal,    Phalen's equivocal.    Flexion/compression test equivocal.   Finkelstein's test: positive, mild   Ulnar piano sign: negative   Ulnar foveal tenderness:  negative    Special tests medial elbow ulnar nerve:    Tinel's negative,    Flexion/compression test negative.    Special tests median nerve proximal forearm:    Tinel's negative.    1st carpometacarpal grind: negative    Intact sensation light touch median, radial, ulnar nerves of the hand  Intact sensation to the radial and ulnar digital nerves of the fingers, as well as the finger tips.  Intact epl fpl fdp edc wrist flexion/extension biceps/triceps deltoid  Brisk capillary refill to all fingers.   Palpable radial pulse, 2+.      XRAYS: none indicated.    SPECIAL STUDIES:  EMG: none      ASSESSMENT/PLAN: 36yo LHD female with bilateral wrist pain, numbness and tingling consistent with:  1) bilateral carpal tunnel syndrome   2) likely mild dequervains tenosynovitis.    * discussed with patient signs and symptoms seem consistent with carpal tunnel syndrome. Carpal tunnel syndrome is compression or pinching of the median nerve at the wrist, as it enters the hand. There are many different causes, and in most cases, multifactorial.    * An indepth discussion was had with her about the options for treatment, which included activity modification to avoid aggravating  activities, taking breaks during activities that cause symptoms, stretching, NSAIDS to help decrease inflammation and swelling within the carpal tunnel, night splinting, corticosteroid injections, and carpal tunnel release.   * depending upon severity and duration of symptoms, nonoperative treatment is usually initiated, starting with least invasive modalities such as activity modification and a trial of night splints and NSAIDs.  * Cortisone injections are considered to decrease swelling and inflammation within the carpal tunnel and compression of the nerve.   * Lastly, carpal tunnel release should symptoms persist despite trial of nonoperative treatment, or in cases of severe carpal tunnel syndrome.  * risks of surgery, including, but not limited to: bleeding, infection, pain, scar, damage to adjacent structures (nerves, vessels, tendons), temporary versus permanent nerve injury, failure to relieve symptoms, recurrence of symptoms, incomplete release, stiffness, scar sensitivity and tenderness, need for further surgery, risks of anesthesia were discussed.  * in some cases, with severe, prolonged symptoms, or in situations of underlying peripheral neuropathy, there may be permanent nerve changes not amenable to surgery, that even with surgery, may not resolve.  * in some cases, it may take 6 months to a year or longer for symptoms to improve or resolve, but even then may not completely resolve.    * plan: bilateral neutral wrist braces at night, provided today  * consider carpal tunnel injections as needed.  * if symptoms continue to worsen, then likely referral for EMG studies, potential surgical release  * return to clinic as needed.      Reji Lantigua M.D., M.S.  Dept. of Orthopaedic Surgery  [facility]      Again, thank you for allowing me to participate in the care of your patient.        Sincerely,        Reji Lantigua MD

## 2021-01-25 NOTE — PROGRESS NOTES
Yvonne presents for colposcopy. Pap showed: Normal HR HPV+.     PMH/PSH/Meds/Allergies reviewed & documented in [facility].    I discussed with the patient the results of her pap smear and/or HPV studies.    I discussed our current understanding of abnormal cytology and the role hpv can play in pre-cancerous cervical change.  I explained the current cytological/histologic terminology.      We discussed the screening nature of pap smears and the need for a more definitive examination.    She is given the opportunity to ask questions and have them answered.  She does agree to proceed with colposcopy.    Procedure for colposcopy and biopsy has been explained to the   patient and consent obtained.    PROCEDURE:  Speculum placed in vagina and excellent visualization of cervix achieved, cervix swabbed  with acetic acid solution.    FINDINGS:  Cervix: no punctation, no abnormal vasculature and acetowhite lesion(s) noted at 3-5 o'clock; SCJ visualized - lesion at 3-5 o'clock, cervical biopsies taken at 4 o'clock, specimen labelled and sent to pathology and hemostasis achieved with silver nitrate.    Vaginal inspection: vaginal colposcopy not performed.    Vulvar colposcopy: vulvar colposcopy not performed.    Procedure Summary: Patient tolerated procedure well and colposcopy adequate.    Colposcopic Impression: Mild dysplasia    20 minutes of  time was spent in discussion regarding her pap and HPV status.  This was in addition to the time required for the procedure.     Plan: Specimens labelled and sent to pathology., Will base further treatment on pathology findings. and treatment options discussed with patient.    Rafael Coppola MD FACOG

## 2021-01-25 NOTE — PROGRESS NOTES
CHIEF COMPLAINT:   Chief Complaint   Patient presents with     Wrist Pain     Bilateral wrist pain. Onset: 6 months. She noticed it when she was pregnant but it has gotten worse. At night she will have numbness and tingling. Pain is on the volar wrist and will radiate into the thumbs. She does have some OTC wrist braces but they only help for a little bit. She has been taking tylenol. No tx. She has only wore the braces at times.          HISTORY:  Yvonne Jordan is a 35 year old female , left-hand dominant who is seen in for Bilateral hand numbness and tingling and pain x 6+ months, R = L. Onset during pregnancy but has worsened. Has numbness and tingling worse at night. Locates pain in the palmar aspect of the wrist and into the thumbs.  The symptoms have been constant, and not helped by over the counter braces she wears occasionally the past couple of months.  Has not tried a injections.  she has numbness and tingling in all digits, but can even go up the arms.  Other symptoms include pain up arm.     Aggravated with taking care of baby, using keyboard for work.    Suspected cause: Due to pregnancy  Pain severity: 5/10  Pain quality: dull, aching and sharp  Frequency of symptoms: are constant.  Aggravating Factors: activities.  Relieving Factors: tylenol, wrist brace.  Previous modalities tried: wrist brace from [employer].  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  Patient Active Problem List   Diagnosis     Hyperlipidemia LDL goal <160     Irregular menses     S/P breast augmentation     NATHAN 3 - cervical intraepithelial neoplasia grade 3     Health Care Home     Obesity,  "unspecified obesity severity, unspecified obesity type     Hypothyroidism due to Hashimoto's thyroiditis     PCOS (polycystic ovarian syndrome)     High risk HPV infection     Morbid obesity (H)     Fatty liver     Supervision of high risk pregnancy, antepartum     Postpartum care and examination       Surgical Hx:  has a past surgical history that includes Mouth surgery; Cosmetic mammoplasty augmentation; Colposcopy, biopsy, combined (2009); Conization (2005); and Head and neck surgery.    Medications:   Current Outpatient Medications:      Calcium Polycarbophil (FIBER) 625 MG tablet, Take by mouth daily, Disp: , Rfl:      levothyroxine (SYNTHROID/LEVOTHROID) 88 MCG tablet, TAKE ONE TABLET BY MOUTH ONCE DAILY . (NEED TO BE SEEN IN CLINIC FOR FURTHER REFILLS), Disp: 30 tablet, Rfl: 10     Prenatal Vit-Fe Fumarate-FA (PRENATAL VITAMIN PO), , Disp: , Rfl:     Allergies:   Allergies   Allergen Reactions     Nkda [No Known Drug Allergies]        Social Hx: desk job.  reports that she has never smoked. She has never used smokeless tobacco. She reports previous alcohol use. She reports that she does not use drugs.    Family Hx: family history includes Arthritis in her mother; Cancer in her maternal grandmother, paternal grandfather, and paternal grandmother; Diabetes in her father; Hypertension in her father and maternal grandmother; Lung Cancer in her paternal grandmother; Thyroid Disease in her mother..    REVIEW OF SYSTEMS: 10 point ROS neg other than the symptoms noted above in the HPI and PMH. Notables include  CONSTITUTIONAL:NEGATIVE for fever, chills, change in weight  INTEGUMENTARY/SKIN: NEGATIVE for worrisome rashes, moles or lesions  MUSCULOSKELETAL:See HPI above  Neurology: see HPI above.      EXAM:  BP (!) 140/84   Pulse 99   Ht 1.727 m (5' 8\")   Wt 123.4 kg (272 lb)   BMI 41.36 kg/m    GENERAL APPEARANCE: healthy, alert and no distress   GAIT: NORMAL  SKIN: no suspicious lesions or rashes  RESPIRATORY: No " increased work of breathing.  NEURO:     strength: decreased,    thenar fasiculations: negative    Thenar atrophy: negative .    Sensation intact in  all digits,    reflexes normal in upper extremities.   PSYCH:  mentation appears normal and affect normal, not anxious.    MUSCULOSKELETAL:    RIGHT HAND/FINGERS:    Skin intact. No abnormal skin discoloration, erythema or ecchymosis.   No nail pitting or clubbing.  Normal wear pattern, color and tone.  No observable or palpable masses of the fingers or palm or wrist.  No palpable triggering of fingers.   No observable or palpable cords or nodules of the fingers or palm.    There is no swelling in the wrist, hand, forearm.  There is mild tenderness in the volar wrist.  There is no ecchymosis.  There is no erythema of the surrounding skin.  There is no maceration of the skin.  There is no deformity in the area.  Intact extensors. No extensor lag.    Special tests wrist:    Tinel's positive,    Phalen's positive.    Flexion/compression test positive.   Finkelstein's test: positive, mild   Ulnar piano sign: negative   Ulnar foveal tenderness:  negative    Special tests medial elbow ulnar nerve:    Tinel's negative,    Flexion/compression test negative.    Special tests median nerve proximal forearm:    Tinel's negative.    1st carpometacarpal grind: negative    Intact sensation light touch median, radial, ulnar nerves of the hand  Intact sensation to the radial and ulnar digital nerves of the fingers, as well as the finger tips.  Intact epl fpl fdp edc wrist flexion/extension biceps/triceps deltoid  Brisk capillary refill to all fingers.   Palpable radial pulse, 2+.      LEFT HAND/FINGERS:    Skin intact. No abnormal skin discoloration, erythema or ecchymosis.   No nail pitting or clubbing.  Normal wear pattern, color and tone.  No observable or palpable masses of the fingers or palm or wrist.  No palpable triggering of fingers.   No observable or palpable cords or  nodules of the fingers or palm.    There is no swelling in the wrist, hand, forearm.  There is mild tenderness in the wrist.  There is no ecchymosis.  There is no erythema of the surrounding skin.  There is no maceration of the skin.  There is no deformity in the area.  Intact extensors. No extensor lag.    Special tests wrist:    Tinel's equivocal,    Phalen's equivocal.    Flexion/compression test equivocal.   Finkelstein's test: positive, mild   Ulnar piano sign: negative   Ulnar foveal tenderness:  negative    Special tests medial elbow ulnar nerve:    Tinel's negative,    Flexion/compression test negative.    Special tests median nerve proximal forearm:    Tinel's negative.    1st carpometacarpal grind: negative    Intact sensation light touch median, radial, ulnar nerves of the hand  Intact sensation to the radial and ulnar digital nerves of the fingers, as well as the finger tips.  Intact epl fpl fdp edc wrist flexion/extension biceps/triceps deltoid  Brisk capillary refill to all fingers.   Palpable radial pulse, 2+.      XRAYS: none indicated.    SPECIAL STUDIES:  EMG: none      ASSESSMENT/PLAN: 36yo LHD female with bilateral wrist pain, numbness and tingling consistent with:  1) bilateral carpal tunnel syndrome   2) likely mild dequervains tenosynovitis.    * discussed with patient signs and symptoms seem consistent with carpal tunnel syndrome. Carpal tunnel syndrome is compression or pinching of the median nerve at the wrist, as it enters the hand. There are many different causes, and in most cases, multifactorial.    * An indepth discussion was had with her about the options for treatment, which included activity modification to avoid aggravating activities, taking breaks during activities that cause symptoms, stretching, NSAIDS to help decrease inflammation and swelling within the carpal tunnel, night splinting, corticosteroid injections, and carpal tunnel release.   * depending upon severity and  duration of symptoms, nonoperative treatment is usually initiated, starting with least invasive modalities such as activity modification and a trial of night splints and NSAIDs.  * Cortisone injections are considered to decrease swelling and inflammation within the carpal tunnel and compression of the nerve.   * Lastly, carpal tunnel release should symptoms persist despite trial of nonoperative treatment, or in cases of severe carpal tunnel syndrome.  * risks of surgery, including, but not limited to: bleeding, infection, pain, scar, damage to adjacent structures (nerves, vessels, tendons), temporary versus permanent nerve injury, failure to relieve symptoms, recurrence of symptoms, incomplete release, stiffness, scar sensitivity and tenderness, need for further surgery, risks of anesthesia were discussed.  * in some cases, with severe, prolonged symptoms, or in situations of underlying peripheral neuropathy, there may be permanent nerve changes not amenable to surgery, that even with surgery, may not resolve.  * in some cases, it may take 6 months to a year or longer for symptoms to improve or resolve, but even then may not completely resolve.    * plan: bilateral neutral wrist braces at night, provided today  * consider carpal tunnel injections as needed.  * if symptoms continue to worsen, then likely referral for EMG studies, potential surgical release  * return to clinic as needed.      Reji Lantigua M.D., M.S.  Dept. of Orthopaedic Surgery  [facility]

## 2021-01-27 LAB — COPATH REPORT: NORMAL

## 2021-02-25 ENCOUNTER — PATIENT OUTREACH (OUTPATIENT)
Dept: OBGYN | Facility: CLINIC | Age: 36
End: 2021-02-25

## 2021-02-25 NOTE — TELEPHONE ENCOUNTER
1/25/21 Macy Bx - no NATHAN. Plan: cotest in 1 year, due 1/25/2022.   1/27/21 Pt viewed result on [employer].

## 2021-02-27 DIAGNOSIS — E06.3 HYPOTHYROIDISM DUE TO HASHIMOTO'S THYROIDITIS: ICD-10-CM

## 2021-03-01 ENCOUNTER — MYC MEDICAL ADVICE (OUTPATIENT)
Dept: FAMILY MEDICINE | Facility: CLINIC | Age: 36
End: 2021-03-01

## 2021-03-01 RX ORDER — LEVOTHYROXINE SODIUM 88 UG/1
TABLET ORAL
Qty: 90 TABLET | Refills: 1 | Status: SHIPPED | OUTPATIENT
Start: 2021-03-01 | End: 2021-09-14

## 2021-03-01 NOTE — TELEPHONE ENCOUNTER
Lion message sent to patient to clarify pharmacy.    Baylee BSN-RN  Triage Nurse  [facility]: [facility]

## 2021-03-25 ENCOUNTER — TELEPHONE (OUTPATIENT)
Dept: FAMILY MEDICINE | Facility: CLINIC | Age: 36
End: 2021-03-25

## 2021-03-25 NOTE — TELEPHONE ENCOUNTER
ALEX ONLY  Spoke with mom and patient.   Patient reports she started getting slight pain behind her eyes last evening and today while working on the computer the pain increased, she became nauseated and dizzy.  Pain rated 8/10 at worst and 6/10 now.  BP this am 115/90.  Patient has restarted her Lisinopril about 5-6 days ago and she also restarted her Phentermine.  Denies any sinus symptoms.  appt scheduled for today with Jay Gorman.  Mom is driving patient.  Lubna Menard RN  [facility]

## 2021-03-25 NOTE — TELEPHONE ENCOUNTER
Message from , mother Kathrin calling to ask triage to call regarding patient.  Consent to communicate is in chart.  Will call.  Lubna Menard RN  [facility]

## 2021-04-26 ENCOUNTER — IMMUNIZATION (OUTPATIENT)
Dept: NURSING | Facility: CLINIC | Age: 36
End: 2021-04-26
Payer: COMMERCIAL

## 2021-04-26 PROCEDURE — 0001A PR COVID VAC PFIZER DIL RECON 30 MCG/0.3 ML IM: CPT

## 2021-04-26 PROCEDURE — 91300 PR COVID VAC PFIZER DIL RECON 30 MCG/0.3 ML IM: CPT

## 2021-05-17 ENCOUNTER — IMMUNIZATION (OUTPATIENT)
Dept: NURSING | Facility: CLINIC | Age: 36
End: 2021-05-17
Attending: FAMILY MEDICINE
Payer: COMMERCIAL

## 2021-05-17 PROCEDURE — 91300 PR COVID VAC PFIZER DIL RECON 30 MCG/0.3 ML IM: CPT

## 2021-05-17 PROCEDURE — 0002A PR COVID VAC PFIZER DIL RECON 30 MCG/0.3 ML IM: CPT

## 2021-08-16 ENCOUNTER — OFFICE VISIT (OUTPATIENT)
Dept: FAMILY MEDICINE | Facility: CLINIC | Age: 36
End: 2021-08-16
Payer: COMMERCIAL

## 2021-08-16 VITALS
TEMPERATURE: 98.5 F | HEART RATE: 99 BPM | OXYGEN SATURATION: 98 % | DIASTOLIC BLOOD PRESSURE: 98 MMHG | SYSTOLIC BLOOD PRESSURE: 141 MMHG | BODY MASS INDEX: 41.75 KG/M2 | WEIGHT: 274.6 LBS

## 2021-08-16 DIAGNOSIS — F41.1 GAD (GENERALIZED ANXIETY DISORDER): ICD-10-CM

## 2021-08-16 DIAGNOSIS — E88.819 INSULIN RESISTANCE: ICD-10-CM

## 2021-08-16 DIAGNOSIS — E66.9 OBESITY, UNSPECIFIED OBESITY SEVERITY, UNSPECIFIED OBESITY TYPE: Primary | ICD-10-CM

## 2021-08-16 DIAGNOSIS — E66.9 OBESITY, UNSPECIFIED OBESITY SEVERITY, UNSPECIFIED OBESITY TYPE: ICD-10-CM

## 2021-08-16 DIAGNOSIS — I10 BENIGN ESSENTIAL HYPERTENSION: ICD-10-CM

## 2021-08-16 DIAGNOSIS — E06.3 HYPOTHYROIDISM DUE TO HASHIMOTO'S THYROIDITIS: Primary | ICD-10-CM

## 2021-08-16 DIAGNOSIS — R73.03 PREDIABETES: ICD-10-CM

## 2021-08-16 DIAGNOSIS — E28.2 PCOS (POLYCYSTIC OVARIAN SYNDROME): ICD-10-CM

## 2021-08-16 DIAGNOSIS — R51.9 NONINTRACTABLE EPISODIC HEADACHE, UNSPECIFIED HEADACHE TYPE: ICD-10-CM

## 2021-08-16 LAB
ANION GAP SERPL CALCULATED.3IONS-SCNC: 8 MMOL/L (ref 3–14)
BUN SERPL-MCNC: 15 MG/DL (ref 7–30)
CALCIUM SERPL-MCNC: 9.3 MG/DL (ref 8.5–10.1)
CHLORIDE BLD-SCNC: 106 MMOL/L (ref 94–109)
CO2 SERPL-SCNC: 24 MMOL/L (ref 20–32)
CREAT SERPL-MCNC: 0.64 MG/DL (ref 0.52–1.04)
GFR SERPL CREATININE-BSD FRML MDRD: >90 ML/MIN/1.73M2
GLUCOSE BLD-MCNC: 76 MG/DL (ref 70–99)
POTASSIUM BLD-SCNC: 3.7 MMOL/L (ref 3.4–5.3)
SODIUM SERPL-SCNC: 138 MMOL/L (ref 133–144)
TSH SERPL DL<=0.005 MIU/L-ACNC: 3.23 MU/L (ref 0.4–4)

## 2021-08-16 PROCEDURE — 84443 ASSAY THYROID STIM HORMONE: CPT | Performed by: PHYSICIAN ASSISTANT

## 2021-08-16 PROCEDURE — 99214 OFFICE O/P EST MOD 30 MIN: CPT | Performed by: PHYSICIAN ASSISTANT

## 2021-08-16 PROCEDURE — 80048 BASIC METABOLIC PNL TOTAL CA: CPT | Performed by: PHYSICIAN ASSISTANT

## 2021-08-16 PROCEDURE — 36415 COLL VENOUS BLD VENIPUNCTURE: CPT | Performed by: PHYSICIAN ASSISTANT

## 2021-08-16 RX ORDER — LIRAGLUTIDE 6 MG/ML
INJECTION SUBCUTANEOUS
Qty: 6 ML | Refills: 3 | Status: SHIPPED | OUTPATIENT
Start: 2021-08-16 | End: 2022-04-25

## 2021-08-16 RX ORDER — METFORMIN HCL 500 MG
TABLET, EXTENDED RELEASE 24 HR ORAL
Qty: 90 TABLET | Refills: 0 | Status: SHIPPED | OUTPATIENT
Start: 2021-08-16 | End: 2021-11-13

## 2021-08-16 RX ORDER — TOPIRAMATE 25 MG/1
25 TABLET, FILM COATED ORAL 2 TIMES DAILY
Qty: 180 TABLET | Refills: 3 | Status: SHIPPED | OUTPATIENT
Start: 2021-08-16 | End: 2023-01-26

## 2021-08-16 RX ORDER — LISINOPRIL AND HYDROCHLOROTHIAZIDE 12.5; 2 MG/1; MG/1
1 TABLET ORAL DAILY
Qty: 90 TABLET | Refills: 1 | Status: SHIPPED | OUTPATIENT
Start: 2021-08-16 | End: 2022-04-25

## 2021-08-16 NOTE — PROGRESS NOTES
Shankar Russell is a 36 year old who presents for the following health issues    HPI     Hypothyroidism Follow-up      Since last visit, patient describes the following symptoms: weight gain of 10 lbs, dry skin, anxiety and fatigue      How many servings of fruits and vegetables do you eat daily?  2-3    On average, how many sweetened beverages do you drink each day (Examples: soda, juice, sweet tea, etc.  Do NOT count diet or artificially sweetened beverages)?   2    How many days per week do you exercise enough to make your heart beat faster? 4    How many minutes a day do you exercise enough to make your heart beat faster? 30 - 60    How many days per week do you miss taking your medication? 0    Olya Mayers, [facility]  Recheck of blood pressure. Has been off of her blood pressure since her pregnancy. Home numbers elevated. Noting anxiety. A lot of stressors. No depression.  Some insomnia.   No chest pain/sob/palpitations/dizziness/ha's      Review of Systems   Constitutional, HEENT, cardiovascular, pulmonary, GI, , musculoskeletal, neuro, skin, endocrine and psych systems are negative, except as otherwise noted.      Objective    BP (!) 141/98 (BP Location: Left arm, Patient Position: Sitting, Cuff Size: Adult Large)   Pulse 99   Temp 98.5  F (36.9  C) (Tympanic)   Wt 124.6 kg (274 lb 9.6 oz)   LMP 08/06/2021 (Exact Date)   SpO2 98%   Breastfeeding No   BMI 41.75 kg/m    Body mass index is 41.75 kg/m .  Physical Exam   Eye exam - right eye normal lid, conjunctiva, cornea, pupil and fundus, left eye normal lid, conjunctiva, cornea, pupil and fundus.  Thyroid not palpable, not enlarged, no nodules detected.  CHEST:chest clear to IPPA, no tachypnea, retractions or cyanosis and S1, S2 normal, no murmur, no gallop, rate regular.  No edema    Yvonne was seen today for recheck medication.    Diagnoses and all orders for this visit:    Hypothyroidism due to Hashimoto's thyroiditis  -     TSH;  Future    Body mass index 40.0-44.9, adult (H)    Benign essential hypertension  -     lisinopril-hydrochlorothiazide (ZESTORETIC) 20-12.5 MG tablet; Take 1 tablet by mouth daily If blood pressure above 130/80.--IF BP lower than 130/80 okay to take 1/2 tablet.  -     Basic metabolic panel  (Ca, Cl, CO2, Creat, Gluc, K, Na, BUN); Future    Nonintractable episodic headache, unspecified headache type  -     topiramate (TOPAMAX) 25 MG tablet; Take 1 tablet (25 mg) by mouth 2 times daily    Obesity, unspecified obesity severity, unspecified obesity type  -     topiramate (TOPAMAX) 25 MG tablet; Take 1 tablet (25 mg) by mouth 2 times daily    Insulin resistance  -     liraglutide (VICTOZA PEN) 18 MG/3ML solution; INJECT 1.2MG UNDER THE SKIN DAILY    Prediabetes  -     metFORMIN (GLUCOPHAGE-XR) 500 MG 24 hr tablet; TAKE ONE TABLET BY MOUTH ONCE DAILY WITH DINNER    PCOS (polycystic ovarian syndrome)  -     metFORMIN (GLUCOPHAGE-XR) 500 MG 24 hr tablet; TAKE ONE TABLET BY MOUTH ONCE DAILY WITH DINNER    GUNJAN (generalized anxiety disorder)      work on lifestyle modification  Recheck in 1 mos

## 2021-08-16 NOTE — TELEPHONE ENCOUNTER
Hi,    Can you please send over a new order to the pharmacy for BD pen needles to go with the Victoza prescription? Thanks!    Olya Murphy [facility] Jitendra

## 2021-09-14 ENCOUNTER — TELEPHONE (OUTPATIENT)
Dept: FAMILY MEDICINE | Facility: CLINIC | Age: 36
End: 2021-09-14

## 2021-09-14 DIAGNOSIS — I10 BENIGN ESSENTIAL HYPERTENSION: ICD-10-CM

## 2021-09-14 DIAGNOSIS — E06.3 HYPOTHYROIDISM DUE TO HASHIMOTO'S THYROIDITIS: ICD-10-CM

## 2021-09-14 RX ORDER — LISINOPRIL AND HYDROCHLOROTHIAZIDE 12.5; 2 MG/1; MG/1
1 TABLET ORAL DAILY
Status: CANCELLED | OUTPATIENT
Start: 2021-09-14

## 2021-09-14 NOTE — TELEPHONE ENCOUNTER
Patient requesting Lisinopril dosage to be switched.     She is currently taking lisinopril-hydrochlorothiazide (PRINZIDE/ZESTORETIC) 20-12.5 MG tablet     Sig - Route: Take 1 tablet by mouth daily If blood pressure above 130/80.--IF BP lower than 130/80 okay to take 1/2 tablet. - Oral    She takes 1/2 tab currently. Has a virtual appt scheduled for 9/17/2021 with Antonio, but requesting switch of dosage without visit.     Also requesting refill of levothyroxine (SYNTHROID/LEVOTHROID) 88 MCG tablet      Please advise.

## 2021-09-14 NOTE — TELEPHONE ENCOUNTER
Patient requesting different dosing to Zestoric rx.  See below.  Old rx pended for provider to advise.  Patient also requesting refill on synthroid.  Please advise

## 2021-09-15 RX ORDER — LEVOTHYROXINE SODIUM 88 UG/1
88 TABLET ORAL DAILY
Qty: 90 TABLET | Refills: 0 | Status: SHIPPED | OUTPATIENT
Start: 2021-09-15 | End: 2021-12-02

## 2021-09-15 NOTE — TELEPHONE ENCOUNTER
Requested Prescriptions   Pending Prescriptions Disp Refills     levothyroxine (SYNTHROID/LEVOTHROID) 88 MCG tablet 90 tablet 1     Sig: Take 1 tablet (88 mcg) by mouth daily       There is no refill protocol information for this order        TSH   Date Value Ref Range Status   08/16/2021 3.23 0.40 - 4.00 mU/L Final   05/28/2020 2.17 0.40 - 4.00 mU/L Final         Refilled medication.    Also advised patient of the orders per Antonio Lance PA-C below.      Patient stated understanding and agreeable with the plan of care.     Baylee RN,BSN  Triage Nurse  [facility]: [facility]

## 2021-09-26 ENCOUNTER — HEALTH MAINTENANCE LETTER (OUTPATIENT)
Age: 36
End: 2021-09-26

## 2021-10-14 ENCOUNTER — ALLIED HEALTH/NURSE VISIT (OUTPATIENT)
Dept: FAMILY MEDICINE | Facility: CLINIC | Age: 36
End: 2021-10-14
Payer: COMMERCIAL

## 2021-10-14 DIAGNOSIS — Z23 NEED FOR PROPHYLACTIC VACCINATION AND INOCULATION AGAINST INFLUENZA: Primary | ICD-10-CM

## 2021-10-14 PROCEDURE — 90686 IIV4 VACC NO PRSV 0.5 ML IM: CPT

## 2021-10-14 PROCEDURE — 90471 IMMUNIZATION ADMIN: CPT

## 2021-10-14 PROCEDURE — 99207 PR NO CHARGE NURSE ONLY: CPT

## 2021-11-11 DIAGNOSIS — I10 BENIGN ESSENTIAL HYPERTENSION: Primary | ICD-10-CM

## 2021-11-11 DIAGNOSIS — E78.5 HYPERLIPIDEMIA LDL GOAL <160: ICD-10-CM

## 2021-11-11 DIAGNOSIS — R73.03 PREDIABETES: ICD-10-CM

## 2021-11-11 DIAGNOSIS — E28.2 PCOS (POLYCYSTIC OVARIAN SYNDROME): ICD-10-CM

## 2021-11-12 NOTE — TELEPHONE ENCOUNTER
Routing refill request to provider for review/approval because:  Labs not current:    Lab Results   Component Value Date    A1C 5.5 09/16/2019    A1C 5.6 06/29/2018    A1C 6.1 07/28/2017    A1C 6.2 07/12/2017

## 2021-11-13 RX ORDER — METFORMIN HCL 500 MG
TABLET, EXTENDED RELEASE 24 HR ORAL
Qty: 30 TABLET | Refills: 0 | Status: SHIPPED | OUTPATIENT
Start: 2021-11-13 | End: 2022-04-25

## 2021-11-16 ENCOUNTER — TELEPHONE (OUTPATIENT)
Dept: FAMILY MEDICINE | Facility: CLINIC | Age: 36
End: 2021-11-16
Payer: COMMERCIAL

## 2021-12-09 NOTE — TELEPHONE ENCOUNTER
Left message to call or use "[employer] to schedule a recheck w/ Antonio Chandler on 12/9/2021 at 9:50 AM

## 2021-12-17 NOTE — TELEPHONE ENCOUNTER
Yvonne would like to come in February for med check as was just in in August. No current lab orders from Antonio. If need lab only will need lab order and let her know via [employer] please.   Hannah Rudolph on 12/17/2021 at 11:33 AM

## 2021-12-27 NOTE — TELEPHONE ENCOUNTER
Only lab I'm seeing pt is due for is lipid.      Provider please place additional labs if needed.

## 2022-01-07 ENCOUNTER — PATIENT OUTREACH (OUTPATIENT)
Dept: FAMILY MEDICINE | Facility: CLINIC | Age: 37
End: 2022-01-07
Payer: COMMERCIAL

## 2022-01-13 ENCOUNTER — ALLIED HEALTH/NURSE VISIT (OUTPATIENT)
Dept: FAMILY MEDICINE | Facility: CLINIC | Age: 37
End: 2022-01-13
Payer: COMMERCIAL

## 2022-01-13 DIAGNOSIS — Z23 HIGH PRIORITY FOR 2019-NCOV VACCINE: Primary | ICD-10-CM

## 2022-01-13 PROCEDURE — 0054A COVID-19,PF,PFIZER (12+ YRS): CPT

## 2022-01-13 PROCEDURE — 91305 COVID-19,PF,PFIZER (12+ YRS): CPT

## 2022-01-13 PROCEDURE — 99207 PR NO CHARGE LOS: CPT

## 2022-01-16 ENCOUNTER — HEALTH MAINTENANCE LETTER (OUTPATIENT)
Age: 37
End: 2022-01-16

## 2022-03-03 NOTE — TELEPHONE ENCOUNTER
FYI to provider - Patient is lost to pap tracking follow-up. Attempts to contact pt have been made per reminder process and there has been no reply and/or no appt scheduled.       12/2005 ASC-US +HPV  1/2006 [place] HSIL, NATHAN 2-3  2/2006 [facility] Biopsy HSIL  7/2006 [place] NATHAN 1  2/2007 LSIL  3/2007Colp   8/2007 LSIL  10/2007 LSIL  12/2007 [place] Negative. Rpt in 6 mon  8/2008 LSIL  12/21/09 NIL. Rpt in 6 mon.  9/29/10 NIL. Rpt in 6 mon.  7/28/11 NIL. Rpt in one year.  2/16/12 NIL. Repeat pap in 1 year.  10/31/14 ASCUS Pap, + HR HPV. ([facility])  12/31/14 [place] Bx - NATHAN 1, ECC - negative ([facility])  8/28/15 NIL, + HR HPV. Plan cotest in 1 year.  11/25/16 NIL Pap, + HR HPV. Plan colp  2/2/17 [place] Bx & ECC - negative. Plan cotest in 1 year.   12/22/17 NIL pap, + HR HPV (not 16/18). Plan colp per provider   4/12/18 [place] - visually normal. Plan cotest in 1 year from pap date due by 12/22/18  1/15/19 Lost to follow-up for pap tracking  3/25/19 NIL pap,  Neg HPV. Plan cotest in 3 years  3/26/20 NIL pap, + HR HPV (not 16 or 18) Plan: [place] at next OB Check, Per Dr. Hu.   4/13/20 Per Dr. Coppola, cotest postpartum, JOHN: 10/16/20.  11/23/20 NIL Pap, + HR HPV (neg 16/18). [place] due by 2/23/2021 1/25/21 [place] Bx - no NATHAN. Plan: cotest in 1 year, due 1/25/2022.   1/27/21 Pt viewed result on MyChart.  1/7/22 Reminder mychart  2/8/22 Reminder call- spoke with pt  3/3/22 Lost to follow-up for pap tracking     Radha Hernandez RN BSN, Pap Tracking

## 2022-03-29 ENCOUNTER — MYC MEDICAL ADVICE (OUTPATIENT)
Dept: FAMILY MEDICINE | Facility: CLINIC | Age: 37
End: 2022-03-29
Payer: COMMERCIAL

## 2022-03-29 DIAGNOSIS — E06.3 HYPOTHYROIDISM DUE TO HASHIMOTO'S THYROIDITIS: Primary | ICD-10-CM

## 2022-04-15 ENCOUNTER — LAB (OUTPATIENT)
Dept: LAB | Facility: CLINIC | Age: 37
End: 2022-04-15
Payer: COMMERCIAL

## 2022-04-15 DIAGNOSIS — E06.3 HYPOTHYROIDISM DUE TO HASHIMOTO'S THYROIDITIS: ICD-10-CM

## 2022-04-15 DIAGNOSIS — R73.03 PREDIABETES: ICD-10-CM

## 2022-04-15 DIAGNOSIS — E78.5 HYPERLIPIDEMIA LDL GOAL <160: ICD-10-CM

## 2022-04-15 DIAGNOSIS — I10 BENIGN ESSENTIAL HYPERTENSION: ICD-10-CM

## 2022-04-15 LAB
ANION GAP SERPL CALCULATED.3IONS-SCNC: 9 MMOL/L (ref 3–14)
BUN SERPL-MCNC: 10 MG/DL (ref 7–30)
CALCIUM SERPL-MCNC: 9.5 MG/DL (ref 8.5–10.1)
CHLORIDE BLD-SCNC: 100 MMOL/L (ref 94–109)
CHOLEST SERPL-MCNC: 167 MG/DL
CO2 SERPL-SCNC: 26 MMOL/L (ref 20–32)
CREAT SERPL-MCNC: 0.56 MG/DL (ref 0.52–1.04)
FASTING STATUS PATIENT QL REPORTED: YES
GFR SERPL CREATININE-BSD FRML MDRD: >90 ML/MIN/1.73M2
GLUCOSE BLD-MCNC: 82 MG/DL (ref 70–99)
HBA1C MFR BLD: 5.5 % (ref 0–5.6)
HDLC SERPL-MCNC: 53 MG/DL
LDLC SERPL CALC-MCNC: 74 MG/DL
NONHDLC SERPL-MCNC: 114 MG/DL
POTASSIUM BLD-SCNC: 4 MMOL/L (ref 3.4–5.3)
SODIUM SERPL-SCNC: 135 MMOL/L (ref 133–144)
TRIGL SERPL-MCNC: 200 MG/DL
TSH SERPL DL<=0.005 MIU/L-ACNC: 2.46 MU/L (ref 0.4–4)

## 2022-04-15 PROCEDURE — 80061 LIPID PANEL: CPT

## 2022-04-15 PROCEDURE — 84443 ASSAY THYROID STIM HORMONE: CPT

## 2022-04-15 PROCEDURE — 80048 BASIC METABOLIC PNL TOTAL CA: CPT

## 2022-04-15 PROCEDURE — 83036 HEMOGLOBIN GLYCOSYLATED A1C: CPT

## 2022-04-15 PROCEDURE — 36415 COLL VENOUS BLD VENIPUNCTURE: CPT

## 2022-04-22 NOTE — PROGRESS NOTES
"Shankar Russell is a 37 year old who presents for the following health issues     History of Present Illness       Hypertension: She presents for follow up of hypertension.  She does not check blood pressure  regularly outside of the clinic. Outside blood pressures have been over 140/90. She does not follow a low salt diet.     Hypothyroidism:     Since last visit, patient describes the following symptoms::  Dry skin, Fatigue, Loose stools and Weight gain    Weight gain::  >20 lbs.    Reason for visit:  Med check  Symptom onset:  More than a month  Symptoms include:  Weight gain, fatigue  Symptom intensity:  Moderate  Symptom progression:  Worsening  Had these symptoms before:  Yes  Has tried/received treatment for these symptoms:  Yes  Previous treatment was successful:  Yes  Prior treatment description:  Meds  What makes it worse:  Na  What makes it better:  Na    She eats 2-3 servings of fruits and vegetables daily.She consumes 1 sweetened beverage(s) daily.She exercises with enough effort to increase her heart rate 20 to 29 minutes per day.  She exercises with enough effort to increase her heart rate 4 days per week.   She is taking medications regularly.     Weight is still a concern.   No chest pain/sob/palpitations/dizziness/ha's      Review of Systems   Constitutional, HEENT, cardiovascular, pulmonary, GI, , musculoskeletal, neuro, skin, endocrine and psych systems are negative, except as otherwise noted.      Objective    /85   Pulse 101   Temp 98.8  F (37.1  C) (Tympanic)   Resp 20   Ht 1.727 m (5' 8\")   Wt 129.6 kg (285 lb 12.8 oz)   LMP 04/24/2022   SpO2 99%   Breastfeeding No   BMI 43.46 kg/m    Body mass index is 43.46 kg/m .  Physical Exam   Eye exam - right eye normal lid, conjunctiva, cornea, pupil and fundus, left eye normal lid, conjunctiva, cornea, pupil and fundus.  Thyroid not palpable, not enlarged, no nodules detected.  CHEST:chest clear to IPPA, no tachypnea, " retractions or cyanosis and S1, S2 normal, no murmur, no gallop, rate regular.    Yvonne was seen today for hypertension and thyroid problem.    Diagnoses and all orders for this visit:    Body mass index 40.0-44.9, adult (H)  -     Comprehensive Weight Management; Future  -     Semaglutide (RYBELSUS) 3 MG TABS; Take 3 mg by mouth daily    Hypothyroidism due to Hashimoto's thyroiditis  -     levothyroxine (SYNTHROID/LEVOTHROID) 88 MCG tablet; Take 1 tablet (88 mcg) by mouth daily    Benign essential hypertension  -     lisinopril-hydrochlorothiazide (ZESTORETIC) 20-12.5 MG tablet; Take 1 tablet by mouth daily If blood pressure above 130/80.--IF BP lower than 130/80 okay to take 1/2 tablet.    Prediabetes  -     metFORMIN (GLUCOPHAGE-XR) 500 MG 24 hr tablet; TAKE ONE TABLET BY MOUTH ONCE DAILY WITH DINNER  -     Semaglutide (RYBELSUS) 3 MG TABS; Take 3 mg by mouth daily    PCOS (polycystic ovarian syndrome)  -     metFORMIN (GLUCOPHAGE-XR) 500 MG 24 hr tablet; TAKE ONE TABLET BY MOUTH ONCE DAILY WITH DINNER    Insulin resistance  -     Discontinue: liraglutide (VICTOZA PEN) 18 MG/3ML solution; INJECT 1.2MG UNDER THE SKIN DAILY    Other orders  -     REVIEW OF HEALTH MAINTENANCE PROTOCOL ORDERS      work on lifestyle modification

## 2022-04-25 ENCOUNTER — OFFICE VISIT (OUTPATIENT)
Dept: FAMILY MEDICINE | Facility: CLINIC | Age: 37
End: 2022-04-25
Payer: COMMERCIAL

## 2022-04-25 VITALS
SYSTOLIC BLOOD PRESSURE: 128 MMHG | WEIGHT: 285.8 LBS | DIASTOLIC BLOOD PRESSURE: 85 MMHG | OXYGEN SATURATION: 99 % | TEMPERATURE: 98.8 F | HEIGHT: 68 IN | BODY MASS INDEX: 43.32 KG/M2 | RESPIRATION RATE: 20 BRPM | HEART RATE: 101 BPM

## 2022-04-25 DIAGNOSIS — E28.2 PCOS (POLYCYSTIC OVARIAN SYNDROME): ICD-10-CM

## 2022-04-25 DIAGNOSIS — E88.819 INSULIN RESISTANCE: ICD-10-CM

## 2022-04-25 DIAGNOSIS — R73.03 PREDIABETES: ICD-10-CM

## 2022-04-25 DIAGNOSIS — I10 BENIGN ESSENTIAL HYPERTENSION: ICD-10-CM

## 2022-04-25 DIAGNOSIS — E06.3 HYPOTHYROIDISM DUE TO HASHIMOTO'S THYROIDITIS: ICD-10-CM

## 2022-04-25 PROCEDURE — 99214 OFFICE O/P EST MOD 30 MIN: CPT | Performed by: PHYSICIAN ASSISTANT

## 2022-04-25 RX ORDER — LIRAGLUTIDE 6 MG/ML
INJECTION SUBCUTANEOUS
Qty: 6 ML | Refills: 3 | Status: SHIPPED | OUTPATIENT
Start: 2022-04-25 | End: 2022-04-25

## 2022-04-25 RX ORDER — ORAL SEMAGLUTIDE 3 MG/1
3 TABLET ORAL DAILY
Qty: 30 TABLET | Refills: 3 | Status: SHIPPED | OUTPATIENT
Start: 2022-04-25 | End: 2023-01-26

## 2022-04-25 RX ORDER — LEVOTHYROXINE SODIUM 88 UG/1
88 TABLET ORAL DAILY
Qty: 90 TABLET | Refills: 1 | Status: SHIPPED | OUTPATIENT
Start: 2022-04-25 | End: 2022-11-18

## 2022-04-25 RX ORDER — LISINOPRIL AND HYDROCHLOROTHIAZIDE 12.5; 2 MG/1; MG/1
1 TABLET ORAL DAILY
Qty: 90 TABLET | Refills: 1 | Status: SHIPPED | OUTPATIENT
Start: 2022-04-25 | End: 2024-02-09

## 2022-04-25 RX ORDER — METFORMIN HCL 500 MG
TABLET, EXTENDED RELEASE 24 HR ORAL
Qty: 90 TABLET | Refills: 3 | Status: SHIPPED | OUTPATIENT
Start: 2022-04-25 | End: 2023-10-02

## 2022-04-25 ASSESSMENT — PAIN SCALES - GENERAL: PAINLEVEL: NO PAIN (0)

## 2022-06-11 DIAGNOSIS — R73.03 PREDIABETES: ICD-10-CM

## 2022-06-11 RX ORDER — ORAL SEMAGLUTIDE 7 MG/1
1 TABLET ORAL DAILY
Qty: 30 TABLET | Refills: 2 | Status: SHIPPED | OUTPATIENT
Start: 2022-06-11 | End: 2023-01-26

## 2022-07-25 ENCOUNTER — TELEPHONE (OUTPATIENT)
Dept: FAMILY MEDICINE | Facility: CLINIC | Age: 37
End: 2022-07-25

## 2022-07-25 DIAGNOSIS — E06.3 HYPOTHYROIDISM DUE TO HASHIMOTO'S THYROIDITIS: Primary | ICD-10-CM

## 2022-07-26 NOTE — TELEPHONE ENCOUNTER
Reason for Call: Request for an order or referral:    Order or referral being requested: for an Endocrinologist    Date needed: as soon as possible    Has the patient been seen by the PCP for this problem? YES    Additional comments: none    Phone number Patient can be reached at:  Home number on file [phone] (home)    Best Time:  any    Can we leave a detailed message on this number?  YES    Call taken on 7/25/2022 at 7:10 PM by Laura Romero

## 2022-07-26 NOTE — TELEPHONE ENCOUNTER
Spoke with patient and per her she is requesting referral because she wants a second opinion from the specialist regarding her thyroid disease and medication management.

## 2022-11-17 ENCOUNTER — MYC MEDICAL ADVICE (OUTPATIENT)
Dept: FAMILY MEDICINE | Facility: CLINIC | Age: 37
End: 2022-11-17

## 2022-11-17 DIAGNOSIS — E06.3 HYPOTHYROIDISM DUE TO HASHIMOTO'S THYROIDITIS: ICD-10-CM

## 2022-11-18 RX ORDER — LEVOTHYROXINE SODIUM 88 UG/1
TABLET ORAL
Qty: 90 TABLET | Refills: 0 | Status: SHIPPED | OUTPATIENT
Start: 2022-11-18 | End: 2024-02-09

## 2023-01-24 ASSESSMENT — ENCOUNTER SYMPTOMS
CONSTIPATION: 0
JOINT SWELLING: 0
MYALGIAS: 0
ARTHRALGIAS: 0
DIZZINESS: 0
DYSURIA: 0
HEMATOCHEZIA: 0
BREAST MASS: 0
DIARRHEA: 0
SHORTNESS OF BREATH: 0
ABDOMINAL PAIN: 0
PALPITATIONS: 0
EYE PAIN: 0
SORE THROAT: 0
FREQUENCY: 0
FEVER: 0
WEAKNESS: 0
CHILLS: 0
PARESTHESIAS: 0
HEADACHES: 0
NAUSEA: 0
NERVOUS/ANXIOUS: 0
HEARTBURN: 0
HEMATURIA: 0
COUGH: 0

## 2023-01-26 ENCOUNTER — OFFICE VISIT (OUTPATIENT)
Dept: FAMILY MEDICINE | Facility: CLINIC | Age: 38
End: 2023-01-26
Payer: COMMERCIAL

## 2023-01-26 VITALS
TEMPERATURE: 98.4 F | BODY MASS INDEX: 44.41 KG/M2 | SYSTOLIC BLOOD PRESSURE: 118 MMHG | OXYGEN SATURATION: 95 % | RESPIRATION RATE: 16 BRPM | DIASTOLIC BLOOD PRESSURE: 83 MMHG | WEIGHT: 293 LBS | HEART RATE: 112 BPM | HEIGHT: 68 IN

## 2023-01-26 DIAGNOSIS — Z12.4 SCREENING FOR MALIGNANT NEOPLASM OF CERVIX: ICD-10-CM

## 2023-01-26 DIAGNOSIS — E66.01 MORBID OBESITY (H): ICD-10-CM

## 2023-01-26 DIAGNOSIS — Z00.00 ROUTINE GENERAL MEDICAL EXAMINATION AT A HEALTH CARE FACILITY: Primary | ICD-10-CM

## 2023-01-26 PROCEDURE — 87624 HPV HI-RISK TYP POOLED RSLT: CPT | Performed by: FAMILY MEDICINE

## 2023-01-26 PROCEDURE — 90471 IMMUNIZATION ADMIN: CPT | Performed by: FAMILY MEDICINE

## 2023-01-26 PROCEDURE — G0145 SCR C/V CYTO,THINLAYER,RESCR: HCPCS | Performed by: FAMILY MEDICINE

## 2023-01-26 PROCEDURE — 90686 IIV4 VACC NO PRSV 0.5 ML IM: CPT | Performed by: FAMILY MEDICINE

## 2023-01-26 PROCEDURE — 99395 PREV VISIT EST AGE 18-39: CPT | Mod: 25 | Performed by: FAMILY MEDICINE

## 2023-01-26 ASSESSMENT — ENCOUNTER SYMPTOMS
HEMATOCHEZIA: 0
CHILLS: 0
WEAKNESS: 0
DIZZINESS: 0
FEVER: 0
ABDOMINAL PAIN: 0
DIARRHEA: 0
DYSURIA: 0
HEMATURIA: 0
SORE THROAT: 0
EYE PAIN: 0
HEADACHES: 0
PARESTHESIAS: 0
NAUSEA: 0
SHORTNESS OF BREATH: 0
PALPITATIONS: 0
HEARTBURN: 0
NERVOUS/ANXIOUS: 0
COUGH: 0
MYALGIAS: 0
ARTHRALGIAS: 0
BREAST MASS: 0
FREQUENCY: 0
CONSTIPATION: 0
JOINT SWELLING: 0

## 2023-01-26 ASSESSMENT — PAIN SCALES - GENERAL: PAINLEVEL: NO PAIN (0)

## 2023-01-26 NOTE — PROGRESS NOTES
SUBJECTIVE:   CC: Yvonne is an 37 year old who presents for preventive health visit.   Patient has been advised of split billing requirements and indicates understanding: Yes  Healthy Habits:     Getting at least 3 servings of Calcium per day:  Yes    Bi-annual eye exam:  NO    Dental care twice a year:  Yes    Sleep apnea or symptoms of sleep apnea:  Daytime drowsiness    Diet:  Regular (no restrictions)    Frequency of exercise:  2-3 days/week    Duration of exercise:  15-30 minutes    Taking medications regularly:  Yes    Medication side effects:  Not applicable    PHQ-2 Total Score: 0    Additional concerns today:  No    Preventive -     Immunization History   Administered Date(s) Administered     COVID-19 Vaccine 12+ (Pfizer 2022) 01/13/2022     COVID-19 Vaccine 12+ (Pfizer) 04/26/2021, 05/17/2021     HPV 01/26/2007, 04/02/2007, 07/27/2007     HPV Quadrivalent 01/26/2007, 04/02/2007, 07/01/2007, 07/27/2007     Influenza (IIV3) PF 10/14/2010, 09/30/2011, 10/04/2012, 10/03/2013     Influenza Vaccine >6 months (Alfuria,Fluzone) 11/29/2018, 09/17/2020, 10/14/2021     TDAP Vaccine (Adacel) 07/28/2011, 08/06/2020     Flu vaccine today     -Mammogram:not indicated     -Contraception: no     -PAP:   HPV +ve   Had colposcopy     Lab Results   Component Value Date    PAP NIL 11/23/2020    PAP NIL 03/26/2020    PAP NIL 03/25/2019   PAP history   12/2005 ASC-US +HPV  1/2006 [place] HSIL, NATHAN 2-3  2/2006 [facility] Biopsy HSIL  7/2006 [place] NATHAN 1  2/2007 LSIL  3/2007Colp   8/2007 LSIL  10/2007 LSIL  12/2007 [place] Negative. Rpt in 6 mon  8/2008 LSIL  12/21/09 NIL. Rpt in 6 mon.  9/29/10 NIL. Rpt in 6 mon.  7/28/11 NIL. Rpt in one year.  2/16/12 NIL. Repeat pap in 1 year.  10/31/14 ASCUS Pap, + HR HPV. ([facility])  12/31/14 [place] Bx - NATHAN 1, ECC - negative ([facility])  8/28/15 NIL, + HR HPV. Plan cotest in 1 year.  11/25/16 NIL Pap, + HR HPV. Plan colp  2/2/17 [place] Bx & ECC - negative. Plan cotest in 1 year.   12/22/17 NIL pap, + HR HPV (not  16/18). Plan colp per provider   4/12/18 [place] - visually normal. Plan cotest in 1 year from pap date due by 12/22/18  1/15/19 Lost to follow-up for pap tracking  3/25/19 NIL pap,  Neg HPV. Plan cotest in 3 years  3/26/20 NIL pap, + HR HPV (not 16 or 18) Plan: [place] at next OB Check, Per Dr. Hu.   4/13/20 Per Dr. Coppola, cotest postpartum, JOHN: 10/16/20.  11/23/20 NIL Pap, + HR HPV (neg 16/18). [place] due by 2/23/2021 1/25/21 [place] Bx - no NATHAN. Plan: cotest in 1 year, due 1/25/2022.     - Colon CA screen: Colonoscopy, age 45-75 every 10 years or FIT every year or Cologuard every 3 years   No family hx of colon cancer       -lipids screen: done 11/2022    Diabetes screen: done 11/2022      Today's PHQ-2 Score:   PHQ-2 ( 1999 Pfizer) 1/24/2023   Q1: Little interest or pleasure in doing things 0   Q2: Feeling down, depressed or hopeless 0   PHQ-2 Score 0   PHQ-2 Total Score (12-17 Years)- Positive if 3 or more points; Administer PHQ-A if positive -   Q1: Little interest or pleasure in doing things Not at all   Q2: Feeling down, depressed or hopeless Not at all   PHQ-2 Score 0       Have you ever done Advance Care Planning? (For example, a Health Directive, POLST, or a discussion with a medical provider or your loved ones about your wishes): No, advance care planning information given to patient to review.  Advanced care planning was discussed at today's visit.    Social History     Tobacco Use     Smoking status: Never     Smokeless tobacco: Never   Substance Use Topics     Alcohol use: Not Currently     Comment: very rare     If you drink alcohol do you typically have >3 drinks per day or >7 drinks per week? No    No flowsheet data found.    Reviewed orders with patient.  Reviewed health maintenance and updated orders accordingly - Yes  Labs reviewed in EPIC  BP Readings from Last 3 Encounters:   01/26/23 118/83   04/25/22 128/85   08/16/21 (!) 141/98    Wt Readings from Last 3 Encounters:   01/26/23 133.8 kg (295  lb)   04/25/22 129.6 kg (285 lb 12.8 oz)   08/16/21 124.6 kg (274 lb 9.6 oz)                  Patient Active Problem List   Diagnosis     Hyperlipidemia LDL goal <160     Irregular menses     S/P breast augmentation     NATHAN 3 - cervical intraepithelial neoplasia grade 3     Health Care Home     Obesity, unspecified obesity severity, unspecified obesity type     Hypothyroidism due to Hashimoto's thyroiditis     PCOS (polycystic ovarian syndrome)     High risk HPV infection     Morbid obesity (H)     Fatty liver     Supervision of high risk pregnancy, antepartum     Postpartum care and examination     Past Surgical History:   Procedure Laterality Date     COLPOSCOPY, BIOPSY, COMBINED  2009     CONIZATION  2005     COSMETIC MAMMOPLASTY AUGMENTATION       HEAD & NECK SURGERY      wisdom teeth     MOUTH SURGERY      wisdom teeth extraction       Social History     Tobacco Use     Smoking status: Never     Smokeless tobacco: Never   Substance Use Topics     Alcohol use: Not Currently     Comment: very rare     Family History   Problem Relation Age of Onset     Arthritis Mother      Thyroid Disease Mother      Hypertension Father      Diabetes Father      Cancer Maternal Grandmother      Hypertension Maternal Grandmother      Cancer Paternal Grandmother      Lung Cancer Paternal Grandmother      Cancer Paternal Grandfather         Non-Hodgkins Lymphoma         Current Outpatient Medications   Medication Sig Dispense Refill     Calcium Polycarbophil (FIBER) 625 MG tablet Take by mouth daily       levothyroxine (SYNTHROID/LEVOTHROID) 88 MCG tablet TAKE ONE TABLET BY MOUTH ONCE DAILY 90 tablet 0     lisinopril-hydrochlorothiazide (ZESTORETIC) 20-12.5 MG tablet Take 1 tablet by mouth daily If blood pressure above 130/80.--IF BP lower than 130/80 okay to take 1/2 tablet. 90 tablet 1     metFORMIN (GLUCOPHAGE-XR) 500 MG 24 hr tablet TAKE ONE TABLET BY MOUTH ONCE DAILY WITH DINNER 90 tablet 3     Multiple Vitamin (MULTI-VITAMIN  DAILY PO)        Allergies   Allergen Reactions     Nkda [No Known Drug Allergies]      Recent Labs   Lab Test 04/15/22  1325 08/16/21  1521 05/28/20  1420 02/11/20  1459 09/16/19  1759 11/29/18  1654 08/10/18  1403 06/29/18  1417 08/25/17  1608 07/28/17  1537 11/25/16  0903 09/16/16  0905   A1C 5.5  --   --   --  5.5  --   --  5.6  --  6.1*   < >  --    LDL 74  --   --   --   --   --  90  --   --   --   --  81   HDL 53  --   --   --   --   --  46*  --   --   --   --  42*   TRIG 200*  --   --   --   --   --  162*  --   --   --   --  196*   ALT  --   --  15  --  42  --   --  53*   < > 84*   < >  --    CR 0.56 0.64  --   --   --   --   --  0.72  --  0.63   < > 0.55   GFRESTIMATED >90 >90  --   --   --   --   --  >90  --  >90  Non  GFR Calc     < > >90  Non  GFR Calc     GFRESTBLACK  --   --   --   --   --   --   --  >90  --  >90  African American GFR Calc     < > >90   GFR Calc     POTASSIUM 4.0 3.7  --   --   --   --   --  4.1  --  4.1   < >  --    TSH 2.46 3.23 2.17   < > 0.05*   < > 0.01* <0.01*   < > 5.91*   < > 4.86*    < > = values in this interval not displayed.        Breast Cancer Screening:    Breast CA Risk Assessment (FHS-7) 1/24/2023   Do you have a family history of breast, colon, or ovarian cancer? No / Unknown         Patient under 40 years of age: Routine Mammogram Screening not recommended.   Pertinent mammograms are reviewed under the imaging tab.    History of abnormal Pap smear:   Last 3 Pap Results:   PAP (no units)   Date Value   11/23/2020 NIL   03/26/2020 NIL   03/25/2019 NIL     Last 3 Pap and HPV Results:   PAP / HPV Latest Ref Rng & Units 11/23/2020 3/26/2020 3/25/2019   PAP (Historical) - NIL NIL NIL   HPV16 NEG:Negative Negative Negative Negative   HPV18 NEG:Negative Negative Negative Negative   HRHPV NEG:Negative Positive(A) Positive(A) Negative     PAP / HPV Latest Ref Rng & Units 11/23/2020 3/26/2020 3/25/2019   PAP (Historical) - NIL NIL  NIL   HPV16 NEG:Negative Negative Negative Negative   HPV18 NEG:Negative Negative Negative Negative   HRHPV NEG:Negative Positive(A) Positive(A) Negative     Reviewed and updated as needed this visit by clinical staff     Meds              Reviewed and updated as needed this visit by Provider                 Past Medical History:   Diagnosis Date     Abnormal Pap smear      Cervical high risk HPV (human papillomavirus) test positive 2015, 16, 17    Neg 16/18     Hx of colposcopy with cervical biopsy 17    Bx & ECC - negative     Hypertension      Menstrual migraine      S/P [facility] biopsy of cervix 2006    HSIL NATHAN 2-3     Thyroid disease       Past Surgical History:   Procedure Laterality Date     COLPOSCOPY, BIOPSY, COMBINED       CONIZATION       COSMETIC MAMMOPLASTY AUGMENTATION       HEAD & NECK SURGERY      wisdom teeth     MOUTH SURGERY      wisdom teeth extraction     OB History    Para Term  AB Living   1 1 1 0 0 1   SAB IAB Ectopic Multiple Live Births   0 0 0 0 1      # Outcome Date GA Lbr [place]/2nd Weight Sex Delivery Anes PTL Lv   1 Term 10/12/20 39w3d 09:05 / 01:24 3.71 kg (8 lb 2.9 oz) M Vag-Spont EPI N GIANA      Name: ALYSA HEMPHILL      Apgar1: 6  Apgar5: 9       Review of Systems   Constitutional: Negative for chills and fever.   HENT: Negative for congestion, ear pain, hearing loss and sore throat.    Eyes: Negative for pain and visual disturbance.   Respiratory: Negative for cough and shortness of breath.    Cardiovascular: Negative for chest pain, palpitations and peripheral edema.   Gastrointestinal: Negative for abdominal pain, constipation, diarrhea, heartburn, hematochezia and nausea.   Breasts:  Negative for tenderness, breast mass and discharge.   Genitourinary: Negative for dysuria, frequency, genital sores, hematuria, pelvic pain, urgency, vaginal bleeding and vaginal discharge.   Musculoskeletal: Negative for arthralgias, joint swelling and  myalgias.   Skin: Negative for rash.   Neurological: Negative for dizziness, weakness, headaches and paresthesias.   Psychiatric/Behavioral: Negative for mood changes. The patient is not nervous/anxious.           OBJECTIVE:   There were no vitals taken for this visit.  Physical Exam  Vitals and nursing note reviewed.   Constitutional:       General: She is not in acute distress.     Appearance: Normal appearance. She is obese. She is not ill-appearing, toxic-appearing or diaphoretic.   HENT:      Head: Normocephalic and atraumatic.   Cardiovascular:      Rate and Rhythm: Normal rate and regular rhythm.      Heart sounds: No murmur heard.    No friction rub. No gallop.   Pulmonary:      Effort: Pulmonary effort is normal. No respiratory distress.      Breath sounds: Normal breath sounds. No stridor. No wheezing, rhonchi or rales.   Chest:      Chest wall: No tenderness.   Skin:     Capillary Refill: Capillary refill takes less than 2 seconds.   Neurological:      General: No focal deficit present.      Mental Status: She is alert and oriented to person, place, and time.               ASSESSMENT/PLAN:   (Z00.00) Routine general medical examination at a health care facility  (primary encounter diagnosis)  Comment: Preventive care reviewed and updated.  Plan:     (E66.01) Morbid obesity (H)  Comment:   Body mass index is 44.85 kg/m .  Wt Readings from Last 4 Encounters:   01/26/23 133.8 kg (295 lb)   04/25/22 129.6 kg (285 lb 12.8 oz)   08/16/21 124.6 kg (274 lb 9.6 oz)   01/25/21 125.2 kg (276 lb)   contributes to hypertension   Plan:   discussed diet and exercise   She follows with endocrinology at [facility]  consider starting Wegovy   (Z12.4) Screening for malignant neoplasm of cervix  Comment:  Pap completed today - informed the I was fully visualize the cervix, we might need to repeat PAP if not satisfactory for evaluation   Plan: Pap Screen with HPV - recommended age 30 - 65         years      Patient has been  "advised of split billing requirements and indicates understanding: Yes      COUNSELING:  Reviewed preventive health counseling, as reflected in patient instructions       Regular exercise       Healthy diet/nutrition       Immunizations    Vaccinated for: Influenza            BMI:   Estimated body mass index is 43.46 kg/m  as calculated from the following:    Height as of 4/25/22: 1.727 m (5' 8\").    Weight as of 4/25/22: 129.6 kg (285 lb 12.8 oz).   Weight management plan: Discussed healthy diet and exercise guidelines      She reports that she has never smoked. She has never used smokeless tobacco.      Jignesh Rogers MD  [facility]  "

## 2023-01-30 LAB
BKR LAB AP GYN ADEQUACY: NORMAL
BKR LAB AP GYN INTERPRETATION: NORMAL
BKR LAB AP HPV REFLEX: NORMAL
BKR LAB AP PREVIOUS ABNL DX: NORMAL
BKR LAB AP PREVIOUS ABNORMAL: NORMAL
PATH REPORT.COMMENTS IMP SPEC: NORMAL
PATH REPORT.COMMENTS IMP SPEC: NORMAL
PATH REPORT.RELEVANT HX SPEC: NORMAL

## 2023-02-01 ENCOUNTER — PATIENT OUTREACH (OUTPATIENT)
Dept: FAMILY MEDICINE | Facility: CLINIC | Age: 38
End: 2023-02-01
Payer: COMMERCIAL

## 2023-02-01 LAB
HUMAN PAPILLOMA VIRUS 16 DNA: NEGATIVE
HUMAN PAPILLOMA VIRUS 18 DNA: NEGATIVE
HUMAN PAPILLOMA VIRUS FINAL DIAGNOSIS: NORMAL
HUMAN PAPILLOMA VIRUS OTHER HR: NEGATIVE

## 2023-03-15 ENCOUNTER — MYC REFILL (OUTPATIENT)
Dept: FAMILY MEDICINE | Facility: CLINIC | Age: 38
End: 2023-03-15
Payer: COMMERCIAL

## 2023-03-15 DIAGNOSIS — E66.01 MORBID OBESITY (H): ICD-10-CM

## 2023-03-16 RX ORDER — SEMAGLUTIDE 1.34 MG/ML
0.5 INJECTION, SOLUTION SUBCUTANEOUS
Qty: 1.5 ML | Refills: 0 | Status: SHIPPED | OUTPATIENT
Start: 2023-03-16 | End: 2023-04-13

## 2023-04-12 ENCOUNTER — MYC MEDICAL ADVICE (OUTPATIENT)
Dept: FAMILY MEDICINE | Facility: CLINIC | Age: 38
End: 2023-04-12
Payer: COMMERCIAL

## 2023-04-12 DIAGNOSIS — E66.01 MORBID OBESITY (H): ICD-10-CM

## 2023-04-12 DIAGNOSIS — E66.01 MORBID OBESITY (H): Primary | ICD-10-CM

## 2023-04-13 RX ORDER — SEMAGLUTIDE 1.34 MG/ML
0.5 INJECTION, SOLUTION SUBCUTANEOUS
Qty: 1.5 ML | Refills: 0 | Status: SHIPPED | OUTPATIENT
Start: 2023-04-13 | End: 2023-06-20

## 2023-05-16 ENCOUNTER — MYC MEDICAL ADVICE (OUTPATIENT)
Dept: OBGYN | Facility: CLINIC | Age: 38
End: 2023-05-16
Payer: COMMERCIAL

## 2023-05-18 ENCOUNTER — NURSE TRIAGE (OUTPATIENT)
Dept: NURSING | Facility: CLINIC | Age: 38
End: 2023-05-18
Payer: COMMERCIAL

## 2023-05-18 DIAGNOSIS — E66.01 MORBID OBESITY (H): ICD-10-CM

## 2023-05-18 NOTE — TELEPHONE ENCOUNTER
Pt calling about Ozempic, needs refill  Pt is wanting to know if needing to increase dose, pt is not losing weight or gaining, pt is maintaining weight.   Current dose taking 1mg once a week.       Meena Perea, RN, BSN  5/18/2023 at 6:08 PM  [facility]      Reason for Disposition    [1] Caller has NON-URGENT medicine question about med that PCP prescribed AND [2] triager unable to answer question    Protocols used: MEDICATION QUESTION CALL-A-AH

## 2023-05-19 ENCOUNTER — MYC REFILL (OUTPATIENT)
Dept: FAMILY MEDICINE | Facility: CLINIC | Age: 38
End: 2023-05-19
Payer: COMMERCIAL

## 2023-05-19 DIAGNOSIS — E66.01 MORBID OBESITY (H): ICD-10-CM

## 2023-06-17 NOTE — TELEPHONE ENCOUNTER
Closing encounter. Refill request addressed and refilled in different encounter.    Meena Perea, RN, BSN  6/17/2023 at 12:09 PM  [facility]

## 2023-06-20 ENCOUNTER — VIRTUAL VISIT (OUTPATIENT)
Dept: FAMILY MEDICINE | Facility: CLINIC | Age: 38
End: 2023-06-20
Payer: COMMERCIAL

## 2023-06-20 DIAGNOSIS — R73.03 PREDIABETES: ICD-10-CM

## 2023-06-20 DIAGNOSIS — E66.9 OBESITY, UNSPECIFIED OBESITY SEVERITY, UNSPECIFIED OBESITY TYPE: ICD-10-CM

## 2023-06-20 DIAGNOSIS — F51.01 PRIMARY INSOMNIA: Primary | ICD-10-CM

## 2023-06-20 PROCEDURE — 99213 OFFICE O/P EST LOW 20 MIN: CPT | Mod: VID | Performed by: PHYSICIAN ASSISTANT

## 2023-06-20 RX ORDER — ZOLPIDEM TARTRATE 5 MG/1
5 TABLET ORAL
Qty: 30 TABLET | Refills: 0 | Status: SHIPPED | OUTPATIENT
Start: 2023-06-20 | End: 2024-07-31

## 2023-06-20 NOTE — PROGRESS NOTES
Yvonne is a 38 year old who is being evaluated via a billable video visit.      How would you like to obtain your AVS? MyChart  If the video visit is dropped, the invitation should be resent by: Text to cell phone: [phone]  Will anyone else be joining your video visit? No              Subjective   Yvonne is a 38 year old, presenting for the following health issues:  Insomnia (Sleeping concerns)        6/20/2023     9:25 AM   Additional Questions   Roomed by None   Accompanied by None         6/20/2023     9:25 AM   Patient Reported Additional Medications   Patient reports taking the following new medications none     History of Present Illness       Reason for visit:  Having trouble with sleep. Weight concerns, want to discuss increasing Ozempic or possibly trying a different medication for weight loss    She eats 2-3 servings of fruits and vegetables daily.She consumes 1 sweetened beverage(s) daily.She exercises with enough effort to increase her heart rate 30 to 60 minutes per day.  She exercises with enough effort to increase her heart rate 5 days per week.   She is taking medications regularly.     Insomnia x the past couple of mos. Both initial and middle insomnia. Feeling sleep deprived now. Gets 3-4 hrs per noc.     Some weight loss with ozempic. Will titrate dose to 2 mg /week.    Review of Systems   Constitutional, HEENT, cardiovascular, pulmonary, GI, , musculoskeletal, neuro, skin, endocrine and psych systems are negative, except as otherwise noted.      Objective           Vitals:  No vitals were obtained today due to virtual visit.    Physical Exam   GENERAL: Healthy, alert and no distress  EYES: Eyes grossly normal to inspection.  No discharge or erythema, or obvious scleral/conjunctival abnormalities.  RESP: No audible wheeze, cough, or visible cyanosis.  No visible retractions or increased work of breathing.    SKIN: Visible skin clear. No significant rash, abnormal pigmentation or  lesions.  NEURO: Cranial nerves grossly intact.  Mentation and speech appropriate for age.  PSYCH: Mentation appears normal, affect normal/bright, judgement and insight intact, normal speech and appearance well-groomed.    Yvonne was seen today for insomnia and weight problem.    Diagnoses and all orders for this visit:    Primary insomnia  -     zolpidem (AMBIEN) 5 MG tablet; Take 1 tablet (5 mg) by mouth nightly as needed for sleep  -     TSH; Future    Prediabetes  -     Semaglutide, 2 MG/DOSE, (OZEMPIC) 8 MG/3ML pen; Inject 2 mg Subcutaneous every 7 days    Obesity, unspecified obesity severity, unspecified obesity type  -     Semaglutide, 2 MG/DOSE, (OZEMPIC) 8 MG/3ML pen; Inject 2 mg Subcutaneous every 7 days    Other orders  -     REVIEW OF HEALTH MAINTENANCE PROTOCOL ORDERS        Discussed sleep hygiene changes  Video-Visit Details    Type of service:  Video Visit   Video Start Time: 12:20 PM  Video End Time:12:35 PM    Originating Location (pt. Location): Home    Distant Location (provider location):  On-site  Platform used for Video Visit: Juanjose

## 2023-09-29 ENCOUNTER — NURSE TRIAGE (OUTPATIENT)
Dept: NURSING | Facility: CLINIC | Age: 38
End: 2023-09-29
Payer: COMMERCIAL

## 2023-09-30 NOTE — TELEPHONE ENCOUNTER
"Pt's mother Kathrin reports pt \"having a headache for past couple of days and sweaty hot, checked blood pressure an hour ago 106/61, called five minutes ago rechecked and 90/67\". Kathrin is not with pt at time of call and consent on file lists Kathrin grandmother not mother. Phone number caller reported does not match phone number for Kathrin on consent.     Advised Kathrin to have pt call for triage or call back when she is with pt.     Kathrin verbalizes understanding and agrees to plan.       Reason for Disposition   [1] Caller is not with the adult (patient) AND [2] probable NON-URGENT symptoms    Protocols used: Information Only Call - No Triage-A-    "

## 2023-10-02 ENCOUNTER — MYC MEDICAL ADVICE (OUTPATIENT)
Dept: FAMILY MEDICINE | Facility: CLINIC | Age: 38
End: 2023-10-02
Payer: COMMERCIAL

## 2023-10-02 DIAGNOSIS — R73.03 PREDIABETES: ICD-10-CM

## 2023-10-02 DIAGNOSIS — E28.2 PCOS (POLYCYSTIC OVARIAN SYNDROME): ICD-10-CM

## 2023-10-03 RX ORDER — METFORMIN HCL 500 MG
TABLET, EXTENDED RELEASE 24 HR ORAL
Qty: 90 TABLET | Refills: 0 | Status: SHIPPED | OUTPATIENT
Start: 2023-10-03 | End: 2024-02-09

## 2024-02-09 ENCOUNTER — OFFICE VISIT (OUTPATIENT)
Dept: FAMILY MEDICINE | Facility: CLINIC | Age: 39
End: 2024-02-09
Payer: COMMERCIAL

## 2024-02-09 VITALS
WEIGHT: 273 LBS | OXYGEN SATURATION: 96 % | TEMPERATURE: 98.6 F | HEART RATE: 105 BPM | RESPIRATION RATE: 18 BRPM | BODY MASS INDEX: 41.37 KG/M2 | HEIGHT: 68 IN | DIASTOLIC BLOOD PRESSURE: 79 MMHG | SYSTOLIC BLOOD PRESSURE: 136 MMHG

## 2024-02-09 DIAGNOSIS — E06.3 HYPOTHYROIDISM DUE TO HASHIMOTO'S THYROIDITIS: ICD-10-CM

## 2024-02-09 DIAGNOSIS — F51.01 PRIMARY INSOMNIA: ICD-10-CM

## 2024-02-09 DIAGNOSIS — I10 BENIGN ESSENTIAL HYPERTENSION: ICD-10-CM

## 2024-02-09 DIAGNOSIS — E28.2 PCOS (POLYCYSTIC OVARIAN SYNDROME): ICD-10-CM

## 2024-02-09 DIAGNOSIS — J01.90 ACUTE SINUSITIS, RECURRENCE NOT SPECIFIED, UNSPECIFIED LOCATION: ICD-10-CM

## 2024-02-09 DIAGNOSIS — Z12.4 SCREENING FOR MALIGNANT NEOPLASM OF CERVIX: ICD-10-CM

## 2024-02-09 DIAGNOSIS — E66.01 MORBID OBESITY (H): ICD-10-CM

## 2024-02-09 DIAGNOSIS — R73.03 PREDIABETES: ICD-10-CM

## 2024-02-09 DIAGNOSIS — Z00.00 ROUTINE GENERAL MEDICAL EXAMINATION AT A HEALTH CARE FACILITY: Primary | ICD-10-CM

## 2024-02-09 LAB
ANION GAP SERPL CALCULATED.3IONS-SCNC: 15 MMOL/L (ref 7–15)
BUN SERPL-MCNC: 11.1 MG/DL (ref 6–20)
CALCIUM SERPL-MCNC: 9.5 MG/DL (ref 8.6–10)
CHLORIDE SERPL-SCNC: 101 MMOL/L (ref 98–107)
CHOLEST SERPL-MCNC: 151 MG/DL
CREAT SERPL-MCNC: 0.61 MG/DL (ref 0.51–0.95)
DEPRECATED HCO3 PLAS-SCNC: 25 MMOL/L (ref 22–29)
DEPRECATED S PYO AG THROAT QL EIA: NEGATIVE
EGFRCR SERPLBLD CKD-EPI 2021: >90 ML/MIN/1.73M2
FASTING STATUS PATIENT QL REPORTED: YES
FLUAV RNA SPEC QL NAA+PROBE: NEGATIVE
FLUBV RNA RESP QL NAA+PROBE: NEGATIVE
GLUCOSE SERPL-MCNC: 84 MG/DL (ref 70–99)
GROUP A STREP BY PCR: NOT DETECTED
HBA1C MFR BLD: 5.6 % (ref 0–5.6)
HDLC SERPL-MCNC: 40 MG/DL
LDLC SERPL CALC-MCNC: 75 MG/DL
NONHDLC SERPL-MCNC: 111 MG/DL
POTASSIUM SERPL-SCNC: 4.1 MMOL/L (ref 3.4–5.3)
RSV RNA SPEC NAA+PROBE: NEGATIVE
SARS-COV-2 RNA RESP QL NAA+PROBE: NEGATIVE
SODIUM SERPL-SCNC: 141 MMOL/L (ref 135–145)
TRIGL SERPL-MCNC: 182 MG/DL
TSH SERPL DL<=0.005 MIU/L-ACNC: 2.73 UIU/ML (ref 0.3–4.2)

## 2024-02-09 PROCEDURE — 87624 HPV HI-RISK TYP POOLED RSLT: CPT | Performed by: FAMILY MEDICINE

## 2024-02-09 PROCEDURE — 99395 PREV VISIT EST AGE 18-39: CPT | Performed by: FAMILY MEDICINE

## 2024-02-09 PROCEDURE — 83036 HEMOGLOBIN GLYCOSYLATED A1C: CPT | Performed by: FAMILY MEDICINE

## 2024-02-09 PROCEDURE — 80061 LIPID PANEL: CPT | Performed by: FAMILY MEDICINE

## 2024-02-09 PROCEDURE — 36415 COLL VENOUS BLD VENIPUNCTURE: CPT | Performed by: FAMILY MEDICINE

## 2024-02-09 PROCEDURE — 80048 BASIC METABOLIC PNL TOTAL CA: CPT | Performed by: FAMILY MEDICINE

## 2024-02-09 PROCEDURE — 87651 STREP A DNA AMP PROBE: CPT | Performed by: FAMILY MEDICINE

## 2024-02-09 PROCEDURE — 84443 ASSAY THYROID STIM HORMONE: CPT | Performed by: FAMILY MEDICINE

## 2024-02-09 PROCEDURE — 99214 OFFICE O/P EST MOD 30 MIN: CPT | Mod: 25 | Performed by: FAMILY MEDICINE

## 2024-02-09 PROCEDURE — 87637 SARSCOV2&INF A&B&RSV AMP PRB: CPT | Performed by: FAMILY MEDICINE

## 2024-02-09 PROCEDURE — G0145 SCR C/V CYTO,THINLAYER,RESCR: HCPCS | Performed by: FAMILY MEDICINE

## 2024-02-09 RX ORDER — METFORMIN HCL 500 MG
TABLET, EXTENDED RELEASE 24 HR ORAL
Qty: 90 TABLET | Refills: 0 | Status: SHIPPED | OUTPATIENT
Start: 2024-02-09 | End: 2024-05-06

## 2024-02-09 RX ORDER — LEVOTHYROXINE SODIUM 88 UG/1
88 TABLET ORAL DAILY
Qty: 90 TABLET | Refills: 1 | Status: SHIPPED | OUTPATIENT
Start: 2024-02-09 | End: 2024-07-31

## 2024-02-09 RX ORDER — LISINOPRIL AND HYDROCHLOROTHIAZIDE 12.5; 2 MG/1; MG/1
1 TABLET ORAL DAILY
Qty: 90 TABLET | Refills: 1 | Status: SHIPPED | OUTPATIENT
Start: 2024-02-09 | End: 2024-07-31

## 2024-02-09 RX ORDER — ZOLPIDEM TARTRATE 5 MG/1
5 TABLET ORAL
Qty: 30 TABLET | Refills: 0 | Status: CANCELLED | OUTPATIENT
Start: 2024-02-09

## 2024-02-09 SDOH — HEALTH STABILITY: PHYSICAL HEALTH: ON AVERAGE, HOW MANY MINUTES DO YOU ENGAGE IN EXERCISE AT THIS LEVEL?: 20 MIN

## 2024-02-09 SDOH — HEALTH STABILITY: PHYSICAL HEALTH: ON AVERAGE, HOW MANY DAYS PER WEEK DO YOU ENGAGE IN MODERATE TO STRENUOUS EXERCISE (LIKE A BRISK WALK)?: 4 DAYS

## 2024-02-09 ASSESSMENT — PAIN SCALES - GENERAL: PAINLEVEL: NO PAIN (0)

## 2024-02-09 ASSESSMENT — SOCIAL DETERMINANTS OF HEALTH (SDOH): HOW OFTEN DO YOU GET TOGETHER WITH FRIENDS OR RELATIVES?: THREE TIMES A WEEK

## 2024-02-09 NOTE — PROGRESS NOTES
Preventive Care Visit  [facility]  Jignesh Rogers MD, Family Medicine  Feb 9, 2024    Assessment & Plan     Routine general medical examination at a health care facility  Preventive care reviewed and updated.    - BASIC METABOLIC PANEL; Future  - Lipid panel reflex to direct LDL Fasting; Future  - Hemoglobin A1c; Future  - BASIC METABOLIC PANEL  - Lipid panel reflex to direct LDL Fasting  - Hemoglobin A1c    Hypothyroidism due to Hashimoto's thyroiditis  Chronic stable problem.  No current symptoms of hypothyroidism.  Continue Synthroid 88 mcg.  - levothyroxine (SYNTHROID/LEVOTHROID) 88 MCG tablet; Take 1 tablet (88 mcg) by mouth daily    Benign essential hypertension  Blood pressure is well-controlled with current medication, continue the same treatment, refill provided.  - lisinopril-hydrochlorothiazide (ZESTORETIC) 20-12.5 MG tablet; Take 1 tablet by mouth daily If blood pressure above 130/80.--IF BP lower than 130/80 okay to take 1/2 tablet.    Primary insomnia  She was prescribed Ambien previously discussed with the patient that I do not recommend Ambien for long-term use.  Discussed sleep hygiene.  Advised to return to discussed next step if no improvement.  - TSH    Prediabetes  Stable A1c  Lab Results   Component Value Date    A1C 5.6 02/09/2024    A1C 5.5 04/15/2022    A1C 5.5 09/16/2019    A1C 5.6 06/29/2018    A1C 6.1 07/28/2017    A1C 6.2 07/12/2017     Continue current medication metformin, and Ozempic.  - metFORMIN (GLUCOPHAGE XR) 500 MG 24 hr tablet; TAKE ONE TABLET BY MOUTH ONCE DAILY WITH DINNER  - Semaglutide, 2 MG/DOSE, (OZEMPIC) 8 MG/3ML pen; Inject 2 mg Subcutaneous every 7 days    PCOS (polycystic ovarian syndrome)  Discussed weight loss.  Continue metformin  - metFORMIN (GLUCOPHAGE XR) 500 MG 24 hr tablet; TAKE ONE TABLET BY MOUTH ONCE DAILY WITH DINNER    Morbid obesity (H)  Body mass index is 41.51 kg/m .  Wt Readings from Last 4 Encounters:   02/09/24 123.8 kg (273  "lb)   01/26/23 133.8 kg (295 lb)   04/25/22 129.6 kg (285 lb 12.8 oz)   08/16/21 124.6 kg (274 lb 9.6 oz)     Contributes to hypertension and PCOS  Discussed diet and exercise.  Continue Ozempic and metformin  - Semaglutide, 2 MG/DOSE, (OZEMPIC) 8 MG/3ML pen; Inject 2 mg Subcutaneous every 7 days    Acute sinusitis, recurrence not specified, unspecified location  Presented with URI symptoms including sinus congestion, facial pain, left ear pain and sore throat.  Prescribed Augmentin.  Will check strep, influenza, RSV, COVID  - Streptococcus A Rapid Screen w/Reflex to PCR - Clinic Collect  - amoxicillin-clavulanate (AUGMENTIN) 875-125 MG tablet; Take 1 tablet by mouth 2 times daily for 7 days  - Symptomatic Influenza A/B, RSV, & SARS-CoV2 PCR (COVID-19) Nasopharyngeal  - Group A Streptococcus PCR Throat Swab    Screening for malignant neoplasm of cervix  Pap completed today.  - Pap Screen with HPV - recommended age 30 - 65 years            BMI  Estimated body mass index is 41.51 kg/m  as calculated from the following:    Height as of this encounter: 1.727 m (5' 8\").    Weight as of this encounter: 123.8 kg (273 lb).   Weight management plan: Discussed healthy diet and exercise guidelines    Counseling  Appropriate preventive services were discussed with this patient, including applicable screening as appropriate for fall prevention, nutrition, physical activity, Tobacco-use cessation, weight loss and cognition.  Checklist reviewing preventive services available has been given to the patient.  Reviewed patient's diet, addressing concerns and/or questions.     Patient has been advised of split billing requirements and indicates understanding: Yes    Work on weight loss  Regular exercise    Subjective   Yvonne is a 38 year old, presenting for the following:  Physical        2/9/2024     7:04 AM   Additional Questions   Roomed by Olya CHUNG   Accompanied by Self        Health Care Directive  Patient does not have a Health " Care Directive or Living Will: Discussed advance care planning with patient; however, patient declined at this time.    HPI      Wt Readings from Last 4 Encounters:   02/09/24 123.8 kg (273 lb)   01/26/23 133.8 kg (295 lb)   04/25/22 129.6 kg (285 lb 12.8 oz)   08/16/21 124.6 kg (274 lb 9.6 oz)     Preventive -    Immunization History   Administered Date(s) Administered    COVID-19 MONOVALENT 12+ (Pfizer) 04/26/2021, 05/17/2021    COVID-19 Monovalent 12+ (Pfizer 2022) 01/13/2022    HPV 01/26/2007, 04/02/2007, 07/27/2007    HPV Quadrivalent 01/26/2007, 04/02/2007, 07/01/2007, 07/27/2007    Influenza (IIV3) PF 10/14/2010, 09/30/2011, 10/04/2012, 10/03/2013    Influenza Vaccine >6 months,quad, PF 11/29/2018, 09/17/2020, 10/14/2021, 01/26/2023    TDAP Vaccine (Adacel) 07/28/2011, 08/06/2020         -Mammogram: not indicated     -Contraception: no     -PAP:     Lab Results   Component Value Date    PAP NIL 11/23/2020    PAP NIL 03/26/2020    PAP NIL 03/25/2019           - Colon CA screen: Colonoscopy, age 45-75 every 10 years or FIT every year or Cologuard every 3 years     No fam hx of colon cancer       -lipids screen: ordered     Diabetes screen: ordered                   2/9/2024   General Health   How would you rate your overall physical health? Good   Feel stress (tense, anxious, or unable to sleep) To some extent   (!) STRESS CONCERN      2/9/2024   Nutrition   Three or more servings of calcium each day? Yes   Diet: Regular (no restrictions)   How many servings of fruit and vegetables per day? (!) 2-3   How many sweetened beverages each day? 0-1         2/9/2024   Exercise   Days per week of moderate/strenous exercise 4 days   Average minutes spent exercising at this level 20 min         2/9/2024   Social Factors   Frequency of gathering with friends or relatives Three times a week   Worry food won't last until get money to buy more No   Food not last or not have enough money for food? No   Do you have housing?   Yes   Are you worried about losing your housing? No   Lack of transportation? No   Unable to get utilities (heat,electricity)? No         2/9/2024   Dental   Dentist two times every year? Yes         2/9/2024   TB Screening   Were you born outside of [place]?  No         Today's PHQ-2 Score:       2/9/2024     7:00 AM   PHQ-2 ( 1999 Pfizer)   Q1: Little interest or pleasure in doing things 0   Q2: Feeling down, depressed or hopeless 0   PHQ-2 Score 0   Q1: Little interest or pleasure in doing things Not at all   Q2: Feeling down, depressed or hopeless Not at all   PHQ-2 Score 0           2/9/2024   Substance Use   Alcohol more than 3/day or more than 7/wk No   Do you use any other substances recreationally? No     Social History     Tobacco Use    Smoking status: Never    Smokeless tobacco: Never   Vaping Use    Vaping Use: Never used   Substance Use Topics    Alcohol use: Not Currently     Comment: very rare    Drug use: No             2/9/2024   Breast Cancer Screening   Family history of breast, colon, or ovarian cancer? No / Unknown      Mammogram Screening - Patient under 40 years of age: Routine Mammogram Screening not recommended.         2/9/2024   STI Screening   New sexual partner(s) since last STI/HIV test? No     History of abnormal Pap smear: Last 3 Pap Results:   PAP (no units)   Date Value   11/23/2020 NIL   03/26/2020 NIL   03/25/2019 NIL     Last 3 Pap and HPV Results:       Latest Ref Rng & Units 1/26/2023     2:35 PM 11/23/2020     9:05 AM 11/23/2020     8:51 AM   PAP / HPV   PAP  Negative for Intraepithelial Lesion or Malignancy (NILM)      PAP (Historical)    NIL    HPV 16 DNA Negative Negative  Negative     HPV 18 DNA Negative Negative  Negative     Other HR HPV Negative Negative  Positive             Latest Ref Rng & Units 1/26/2023     2:35 PM 11/23/2020     9:05 AM 11/23/2020     8:51 AM   PAP / HPV   PAP  Negative for Intraepithelial Lesion or Malignancy (NILM)      PAP (Historical)    NIL     HPV 16 DNA Negative Negative  Negative     HPV 18 DNA Negative Negative  Negative     Other HR HPV Negative Negative  Positive             2024   Contraception/Family Planning   Questions about contraception or family planning No        Reviewed and updated as needed this visit by Provider                    Past Medical History:   Diagnosis Date    Abnormal Pap smear     Cervical high risk HPV (human papillomavirus) test positive 2015, 16, 17    Neg 16/18    Hx of colposcopy with cervical biopsy 17    Bx & ECC - negative    Hypertension     Menstrual migraine     S/P cone biopsy of cervix 2006    HSIL NATHAN 2-3    Thyroid disease      Past Surgical History:   Procedure Laterality Date    COLPOSCOPY, BIOPSY, COMBINED  2009    CONIZATION      COSMETIC MAMMOPLASTY AUGMENTATION      HEAD & NECK SURGERY      wisdom teeth    MOUTH SURGERY      wisdom teeth extraction     OB History    Para Term  AB Living   1 1 1 0 0 1   SAB IAB Ectopic Multiple Live Births   0 0 0 0 1      # Outcome Date GA Lbr [place]/2nd Weight Sex Delivery Anes PTL Lv   1 Term 10/12/20 39w3d 09:05 / 01:24 3.71 kg (8 lb 2.9 oz) M Vag-Spont EPI N GIANA      Name: ALYSA HEMPHILL      Apgar1: 6  Apgar5: 9     Lab work is in process  BP Readings from Last 3 Encounters:   24 136/79   23 118/83   22 128/85    Wt Readings from Last 3 Encounters:   24 123.8 kg (273 lb)   23 133.8 kg (295 lb)   22 129.6 kg (285 lb 12.8 oz)                  Patient Active Problem List   Diagnosis    Hyperlipidemia LDL goal <160    Irregular menses    S/P breast augmentation    NATHAN 3 - cervical intraepithelial neoplasia grade 3    Health Care Home    Obesity, unspecified obesity severity, unspecified obesity type    Hypothyroidism due to Hashimoto's thyroiditis    PCOS (polycystic ovarian syndrome)    High risk HPV infection    Morbid obesity (H)    Fatty liver    Supervision of high risk pregnancy,  antepartum    Postpartum care and examination     Past Surgical History:   Procedure Laterality Date    COLPOSCOPY, BIOPSY, COMBINED  2009    CONIZATION  2005    COSMETIC MAMMOPLASTY AUGMENTATION      HEAD & NECK SURGERY      wisdom teeth    MOUTH SURGERY      wisdom teeth extraction       Social History     Tobacco Use    Smoking status: Never    Smokeless tobacco: Never   Substance Use Topics    Alcohol use: Not Currently     Comment: very rare     Family History   Problem Relation Age of Onset    Arthritis Mother     Thyroid Disease Mother     Hypertension Father     Diabetes Father     Cancer Maternal Grandmother     Hypertension Maternal Grandmother     Cancer Paternal Grandmother     Lung Cancer Paternal Grandmother     Cancer Paternal Grandfather         Non-Hodgkins Lymphoma         Current Outpatient Medications   Medication Sig Dispense Refill    amoxicillin-clavulanate (AUGMENTIN) 875-125 MG tablet Take 1 tablet by mouth 2 times daily for 7 days 14 tablet 0    Calcium Polycarbophil (FIBER) 625 MG tablet Take by mouth daily      levothyroxine (SYNTHROID/LEVOTHROID) 88 MCG tablet Take 1 tablet (88 mcg) by mouth daily 90 tablet 1    lisinopril-hydrochlorothiazide (ZESTORETIC) 20-12.5 MG tablet Take 1 tablet by mouth daily If blood pressure above 130/80.--IF BP lower than 130/80 okay to take 1/2 tablet. 90 tablet 1    metFORMIN (GLUCOPHAGE XR) 500 MG 24 hr tablet TAKE ONE TABLET BY MOUTH ONCE DAILY WITH DINNER 90 tablet 0    Multiple Vitamin (MULTI-VITAMIN DAILY PO)       Semaglutide, 2 MG/DOSE, (OZEMPIC) 8 MG/3ML pen Inject 2 mg Subcutaneous every 7 days 9 mL 3    zolpidem (AMBIEN) 5 MG tablet Take 1 tablet (5 mg) by mouth nightly as needed for sleep 30 tablet 0     Allergies   Allergen Reactions    Nkda [No Known Drug Allergy]      Recent Labs   Lab Test 02/09/24  0753 04/15/22  1325 08/16/21  1521 05/28/20  1420 02/11/20  1459 09/16/19  1759 11/29/18  1654 08/10/18  1403 06/29/18  1417 08/25/17  1608  "07/28/17  1537 11/25/16  0903 09/16/16  0905   A1C 5.6 5.5  --   --   --  5.5  --   --  5.6  --  6.1*   < >  --    LDL  --  74  --   --   --   --   --  90  --   --   --   --  81   HDL  --  53  --   --   --   --   --  46*  --   --   --   --  42*   TRIG  --  200*  --   --   --   --   --  162*  --   --   --   --  196*   ALT  --   --   --  15  --  42  --   --  53*   < > 84*   < >  --    CR  --  0.56 0.64  --   --   --   --   --  0.72  --  0.63   < > 0.55   GFRESTIMATED  --  >90 >90  --   --   --   --   --  >90  --  >90  Non  GFR Calc     < > >90  Non  GFR Calc     GFRESTBLACK  --   --   --   --   --   --   --   --  >90  --  >90  African American GFR Calc     < > >90   GFR Calc     POTASSIUM  --  4.0 3.7  --   --   --   --   --  4.1  --  4.1   < >  --    TSH  --  2.46 3.23 2.17   < > 0.05*   < > 0.01* <0.01*   < > 5.91*   < > 4.86*    < > = values in this interval not displayed.      Review of Systems    Review of Systems  Constitutional, HEENT, cardiovascular, pulmonary, gi and gu systems are negative, except as otherwise noted.     Objective    Exam  /79   Pulse 105   Temp 98.6  F (37  C) (Tympanic)   Resp 18   Ht 1.727 m (5' 8\")   Wt 123.8 kg (273 lb)   LMP 02/02/2024 (Approximate)   SpO2 96%   BMI 41.51 kg/m     Estimated body mass index is 41.51 kg/m  as calculated from the following:    Height as of this encounter: 1.727 m (5' 8\").    Weight as of this encounter: 123.8 kg (273 lb).    Physical Exam  GENERAL: alert and no distress  NECK: no adenopathy, no asymmetry, masses, or scars  RESP: lungs clear to auscultation - no rales, rhonchi or wheezes  CV: regular rate and rhythm, normal S1 S2, no S3 or S4, no murmur, click or rub, no peripheral edema  ABDOMEN: soft, nontender, no hepatosplenomegaly, no masses and bowel sounds normal  MS: no gross musculoskeletal defects noted, no edema      Signed Electronically by: Jignesh Rogers MD    "

## 2024-02-09 NOTE — PATIENT INSTRUCTIONS
"Learning About Being Physically Active  What is physical activity?     Being physically active means doing any kind of activity that gets your body moving.  The types of physical activity that can help you get fit and stay healthy include:  Aerobic or \"cardio\" activities. These make your heart beat faster and make you breathe harder, such as brisk walking, riding a bike, or running. They strengthen your heart and lungs and build up your endurance.  Strength training activities. These make your muscles work against, or \"resist,\" something. Examples include lifting weights or doing push-ups. These activities help tone and strengthen your muscles and bones.  Stretches. These let you move your joints and muscles through their full range of motion. Stretching helps you be more flexible.  Reaching a balance between these three types of physical activity is important because each one contributes to your overall fitness.  What are the benefits of being active?  Being active is one of the best things you can do for your health. It helps you to:  Feel stronger and have more energy to do all the things you like to do.  Focus better at school or work.  Feel, think, and sleep better.  Reach and stay at a healthy weight.  Lose fat and build lean muscle.  Lower your risk for serious health problems, including diabetes, heart attack, high blood pressure, and some cancers.  Keep your heart, lungs, bones, muscles, and joints strong and healthy.  How can you make being active part of your life?  Start slowly. Make it your long-term goal to get at least 30 minutes of exercise on most days of the week. Walking is a good choice. You also may want to do other activities, such as running, swimming, cycling, or playing tennis or team sports.  Pick activities that you like--ones that make your heart beat faster, your muscles stronger, and your muscles and joints more flexible. If you find more than one thing you like doing, do them all. You " "don't have to do the same thing every day.  Get your heart pumping every day. Any activity that makes your heart beat faster and keeps it at that rate for a while counts.  Here are some great ways to get your heart beating faster:  Go for a brisk walk, run, or hike.  Go for a swim or bike ride.  Take an online exercise class or dance.  Play a game of touch football, basketball, or soccer.  Play tennis, pickleball, or racquetball.  Climb stairs.  Even some household chores can be aerobic. Just do them at a faster pace. Raking or mowing the lawn, sweeping the garage, and vacuuming and cleaning your home all can help get your heart rate up.  Strengthen your muscles during the week. You don't have to lift heavy weights or grow big, bulky muscles to get stronger. Doing a few simple activities that make your muscles work against, or \"resist,\" something can help you get stronger. Aim for at least twice a week.  For example, you can:  Do push-ups or sit-ups, which use your own body weight as resistance.  Lift weights or dumbbells or use stretch bands at home or in a gym or community center.  Stretch your muscles often. Stretching will help you as you become more active. It can help you stay flexible and loosen tight muscles. It can also help improve your balance and posture and can be a great way to relax.  Be sure to stretch the muscles you'll be using when you work out. It's best to warm your muscles slightly before you stretch them. Walk or do some other light aerobic activity for a few minutes. Then start stretching.  When you stretch your muscles:  Do it slowly. Stretching is not about going fast or making sudden movements.  Don't push or bounce during a stretch.  Hold each stretch for at least 15 to 30 seconds, if you can. You should feel a stretch in the muscle, but not pain.  Breathe out as you do the stretch. Then breathe in as you hold the stretch. Don't hold your breath.  If you're worried about how more activity " "might affect your health, have a checkup before you start. Follow any special advice your doctor gives you for getting a smart start.  Where can you learn more?  Go to https://www.[employer].net/patiented  Enter W332 in the search box to learn more about \"Learning About Being Physically Active.\"  Current as of: June 6, 2023               Content Version: 13.8    3278-0832 [employer].   Care instructions adapted under license by your healthcare professional. If you have questions about a medical condition or this instruction, always ask your healthcare professional. [employer] disclaims any warranty or liability for your use of this information.      Eating Healthy Foods: Care Instructions  With every meal, you can make healthy food choices. Try to eat a variety of fruits, vegetables, whole grains, lean proteins, and low-fat dairy products. This can help you get the right balance of nutrients, including vitamins and minerals. Small changes add up over time. You can start by adding one healthy food to your meals each day.    Try to make half your plate fruits and vegetables, one-fourth whole grains, and one-fourth lean proteins. Try including dairy with your meals.   Eat more fruits and vegetables. Try to have them with most meals and snacks.   Foods for healthy eating    Fruits    These can be fresh, frozen, canned, or dried.  Try to choose whole fruit rather than fruit juice.  Eat a variety of colors.    Vegetables    These can be fresh, frozen, canned, or dried.  Beans, peas, and lentils count too.    Whole grains    Choose whole-grain breads, cereals, and noodles.  Try brown rice.    Lean proteins    These can include lean meat, poultry, fish, and eggs.  You can also have tofu, beans, peas, lentils, nuts, and seeds.    Dairy    Try milk, yogurt, and cheese.  Choose low-fat or fat-free when you can.  If you need to, use lactose-free milk or fortified plant-based milk products, such as " "soy milk.    Water    Drink water when you're thirsty.  Limit sugar-sweetened drinks, including soda, fruit drinks, and sports drinks.  Where can you learn more?  Go to https://www.[employer].net/patiented  Enter T756 in the search box to learn more about \"Eating Healthy Foods: Care Instructions.\"  Current as of: February 28, 2023               Content Version: 13.8    5774-1729 [employer]   Care instructions adapted under license by your healthcare professional. If you have questions about a medical condition or this instruction, always ask your healthcare professional. [employer] disclaims any warranty or liability for your use of this information.      Learning About Stress  What is stress?     Stress is your body's response to a hard situation. Your body can have a physical, emotional, or mental response. Stress is a fact of life for most people, and it affects everyone differently. What causes stress for you may not be stressful for someone else.  A lot of things can cause stress. You may feel stress when you go on a job interview, take a test, or run a race. This kind of short-term stress is normal and even useful. It can help you if you need to work hard or react quickly. For example, stress can help you finish an important job on time.  Long-term stress is caused by ongoing stressful situations or events. Examples of long-term stress include long-term health problems, ongoing problems at work, or conflicts in your family. Long-term stress can harm your health.  How does stress affect your health?  When you are stressed, your body responds as though you are in danger. It makes hormones that speed up your heart, make you breathe faster, and give you a burst of energy. This is called the fight-or-flight stress response. If the stress is over quickly, your body goes back to normal and no harm is done.  But if stress happens too often or lasts too long, it can have bad effects. " Long-term stress can make you more likely to get sick, and it can make symptoms of some diseases worse. If you tense up when you are stressed, you may develop neck, shoulder, or low back pain. Stress is linked to high blood pressure and heart disease.  Stress also harms your emotional health. It can make you valles, tense, or depressed. Your relationships may suffer, and you may not do well at work or school.  What can you do to manage stress?  You can try these things to help manage stress:   Do something active. Exercise or activity can help reduce stress. Walking is a great way to get started. Even everyday activities such as housecleaning or yard work can help.  Try yoga or yareli chi. These techniques combine exercise and meditation. You may need some training at first to learn them.  Do something you enjoy. For example, listen to music or go to a movie. Practice your hobby or do volunteer work.  Meditate. This can help you relax, because you are not worrying about what happened before or what may happen in the future.  Do guided imagery. Imagine yourself in any setting that helps you feel calm. You can use online videos, books, or a teacher to guide you.  Do breathing exercises. For example:  From a standing position, bend forward from the waist with your knees slightly bent. Let your arms dangle close to the floor.  Breathe in slowly and deeply as you return to a standing position. Roll up slowly and lift your head last.  Hold your breath for just a few seconds in the standing position.  Breathe out slowly and bend forward from the waist.  Let your feelings out. Talk, laugh, cry, and express anger when you need to. Talking with supportive friends or family, a counselor, or a raheel leader about your feelings is a healthy way to relieve stress. Avoid discussing your feelings with people who make you feel worse.  Write. It may help to write about things that are bothering you. This helps you find out how much stress  "you feel and what is causing it. When you know this, you can find better ways to cope.  What can you do to prevent stress?  You might try some of these things to help prevent stress:  Manage your time. This helps you find time to do the things you want and need to do.  Get enough sleep. Your body recovers from the stresses of the day while you are sleeping.  Get support. Your family, friends, and community can make a difference in how you experience stress.  Limit your news feed. Avoid or limit time on social media or news that may make you feel stressed.  Do something active. Exercise or activity can help reduce stress. Walking is a great way to get started.  Where can you learn more?  Go to https://www.mention.net/patiented  Enter N032 in the search box to learn more about \"Learning About Stress.\"  Current as of: February 26, 2023               Content Version: 13.8    9697-0494 [employer].   Care instructions adapted under license by your healthcare professional. If you have questions about a medical condition or this instruction, always ask your healthcare professional. [employer] disclaims any warranty or liability for your use of this information.      Preventive Care Advice   This is general advice given by our system to help you stay healthy. However, your care team may have specific advice just for you. Please talk to your care team about your preventive care needs.  Nutrition  Eat 5 or more servings of fruits and vegetables each day.  Try wheat bread, brown rice and whole grain pasta (instead of white bread, rice, and pasta).  Get enough calcium and vitamin D. Check the label on foods and aim for 100% of the RDA (recommended daily allowance).  Lifestyle  Exercise at least 150 minutes each week  (30 minutes a day, 5 days a week).  Do muscle strengthening activities 2 days a week. These help control your weight and prevent disease.  No smoking.  Wear sunscreen to prevent skin " cancer.  Have a dental exam and cleaning every 6 months.  Yearly exams  See your health care team every year to talk about:  Any changes in your health.  Any medicines your care team has prescribed.  Preventive care, family planning, and ways to prevent chronic diseases.  Shots (vaccines)   HPV shots (up to age 26), if you've never had them before.  Hepatitis B shots (up to age 59), if you've never had them before.  COVID-19 shot: Get this shot when it's due.  Flu shot: Get a flu shot every year.  Tetanus shot: Get a tetanus shot every 10 years.  Pneumococcal, hepatitis A, and RSV shots: Ask your care team if you need these based on your risk.  Shingles shot (for age 50 and up)  General health tests  Diabetes screening:  Starting at age 35, Get screened for diabetes at least every 3 years.  If you are younger than age 35, ask your care team if you should be screened for diabetes.  Cholesterol test: At age 39, start having a cholesterol test every 5 years, or more often if advised.  Bone density scan (DEXA): At age 50, ask your care team if you should have this scan for osteoporosis (brittle bones).  Hepatitis C: Get tested at least once in your life.  STIs (sexually transmitted infections)  Before age 24: Ask your care team if you should be screened for STIs.  After age 24: Get screened for STIs if you're at risk. You are at risk for STIs (including HIV) if:  You are sexually active with more than one person.  You don't use condoms every time.  You or a partner was diagnosed with a sexually transmitted infection.  If you are at risk for HIV, ask about PrEP medicine to prevent HIV.  Get tested for HIV at least once in your life, whether you are at risk for HIV or not.  Cancer screening tests  Cervical cancer screening: If you have a cervix, begin getting regular cervical cancer screening tests starting at age 21.  Breast cancer scan (mammogram): If you've ever had breasts, begin having regular mammograms starting at  age 40. This is a scan to check for breast cancer.  Colon cancer screening: It is important to start screening for colon cancer at age 45.  Have a colonoscopy test every 10 years (or more often if you're at risk) Or, ask your provider about stool tests like a FIT test every year or Cologuard test every 3 years.  To learn more about your testing options, visit:   https://[email or web address].  For help making a decision, visit:   https://[employer].Liquid/st71147.  Prostate cancer screening test: If you have a prostate, ask your care team if a prostate cancer screening test (PSA) at age 55 is right for you.  Lung cancer screening: If you are a current or former smoker ages 50 to 80, ask your care team if ongoing lung cancer screenings are right for you.  For informational purposes only. Not to replace the advice of your health care provider. Copyright   2023 [facility]. All rights reserved. Clinically reviewed by the [facility]. [employer] [id number] - REV 01/24.

## 2024-02-12 ENCOUNTER — MYC MEDICAL ADVICE (OUTPATIENT)
Dept: FAMILY MEDICINE | Facility: CLINIC | Age: 39
End: 2024-02-12
Payer: COMMERCIAL

## 2024-02-12 DIAGNOSIS — B37.31 YEAST INFECTION OF THE VAGINA: Primary | ICD-10-CM

## 2024-02-12 RX ORDER — FLUCONAZOLE 150 MG/1
150 TABLET ORAL ONCE
Qty: 1 TABLET | Refills: 0 | Status: SHIPPED | OUTPATIENT
Start: 2024-02-12 | End: 2024-02-12

## 2024-02-12 NOTE — TELEPHONE ENCOUNTER
Augmentin was ordered for sinusitis 3 days ago and now patient is having symptoms consistent with a vaginal yeast infection. Patient would like 1 diflucan.  Pended order for 1 diflucan.    Routing to provider to advise.  Anila Alcala BSN, RN

## 2024-02-19 ENCOUNTER — PATIENT OUTREACH (OUTPATIENT)
Dept: FAMILY MEDICINE | Facility: CLINIC | Age: 39
End: 2024-02-19
Payer: COMMERCIAL

## 2024-05-06 ENCOUNTER — MYC MEDICAL ADVICE (OUTPATIENT)
Dept: FAMILY MEDICINE | Facility: CLINIC | Age: 39
End: 2024-05-06
Payer: COMMERCIAL

## 2024-05-06 DIAGNOSIS — R73.03 PREDIABETES: ICD-10-CM

## 2024-05-06 DIAGNOSIS — E28.2 PCOS (POLYCYSTIC OVARIAN SYNDROME): ICD-10-CM

## 2024-05-06 RX ORDER — METFORMIN HCL 500 MG
TABLET, EXTENDED RELEASE 24 HR ORAL
Qty: 90 TABLET | Refills: 0 | Status: SHIPPED | OUTPATIENT
Start: 2024-05-06 | End: 2024-05-07

## 2024-05-07 DIAGNOSIS — R73.03 PREDIABETES: ICD-10-CM

## 2024-05-07 DIAGNOSIS — E28.2 PCOS (POLYCYSTIC OVARIAN SYNDROME): ICD-10-CM

## 2024-05-07 RX ORDER — METFORMIN HCL 500 MG
TABLET, EXTENDED RELEASE 24 HR ORAL
Qty: 90 TABLET | Refills: 0 | Status: SHIPPED | OUTPATIENT
Start: 2024-05-07 | End: 2024-07-31

## 2024-07-31 ENCOUNTER — MYC MEDICAL ADVICE (OUTPATIENT)
Dept: FAMILY MEDICINE | Facility: CLINIC | Age: 39
End: 2024-07-31
Payer: COMMERCIAL

## 2024-07-31 DIAGNOSIS — E06.3 HYPOTHYROIDISM DUE TO HASHIMOTO'S THYROIDITIS: ICD-10-CM

## 2024-07-31 DIAGNOSIS — I10 BENIGN ESSENTIAL HYPERTENSION: ICD-10-CM

## 2024-07-31 DIAGNOSIS — R73.03 PREDIABETES: ICD-10-CM

## 2024-07-31 DIAGNOSIS — E28.2 PCOS (POLYCYSTIC OVARIAN SYNDROME): ICD-10-CM

## 2024-07-31 RX ORDER — LEVOTHYROXINE SODIUM 88 UG/1
88 TABLET ORAL DAILY
Qty: 90 TABLET | Refills: 1 | Status: SHIPPED | OUTPATIENT
Start: 2024-07-31

## 2024-07-31 RX ORDER — LISINOPRIL AND HYDROCHLOROTHIAZIDE 12.5; 2 MG/1; MG/1
1 TABLET ORAL DAILY
Qty: 90 TABLET | Refills: 1 | Status: SHIPPED | OUTPATIENT
Start: 2024-07-31

## 2024-07-31 RX ORDER — METFORMIN HCL 500 MG
TABLET, EXTENDED RELEASE 24 HR ORAL
Qty: 90 TABLET | Refills: 1 | Status: SHIPPED | OUTPATIENT
Start: 2024-07-31

## 2024-07-31 NOTE — TELEPHONE ENCOUNTER
Medication's pended. 90 day supply with 1 refill to get pt to next annual appointment in February 2025. Requested pharmacy from pt. Medications pended. Can be sent to provider when pt responds with pharmacy preference.     Thank you - Hayley Ornelas, ASTRIDN, RN

## 2025-01-14 ENCOUNTER — OFFICE VISIT (OUTPATIENT)
Dept: FAMILY MEDICINE | Facility: CLINIC | Age: 40
End: 2025-01-14
Payer: MEDICAID

## 2025-01-14 VITALS
WEIGHT: 293 LBS | OXYGEN SATURATION: 94 % | HEART RATE: 108 BPM | RESPIRATION RATE: 20 BRPM | HEIGHT: 68 IN | DIASTOLIC BLOOD PRESSURE: 80 MMHG | SYSTOLIC BLOOD PRESSURE: 118 MMHG | BODY MASS INDEX: 44.41 KG/M2 | TEMPERATURE: 98.8 F

## 2025-01-14 DIAGNOSIS — R73.03 PREDIABETES: ICD-10-CM

## 2025-01-14 DIAGNOSIS — R61 NIGHT SWEATS: ICD-10-CM

## 2025-01-14 DIAGNOSIS — L91.8 SKIN TAG: ICD-10-CM

## 2025-01-14 DIAGNOSIS — E28.2 PCOS (POLYCYSTIC OVARIAN SYNDROME): ICD-10-CM

## 2025-01-14 DIAGNOSIS — E66.01 MORBID OBESITY (H): ICD-10-CM

## 2025-01-14 DIAGNOSIS — I10 BENIGN ESSENTIAL HYPERTENSION: Primary | ICD-10-CM

## 2025-01-14 DIAGNOSIS — E78.5 HYPERLIPIDEMIA LDL GOAL <160: ICD-10-CM

## 2025-01-14 DIAGNOSIS — E06.3 HYPOTHYROIDISM DUE TO HASHIMOTO'S THYROIDITIS: ICD-10-CM

## 2025-01-14 LAB
BASOPHILS # BLD AUTO: 0 10E3/UL (ref 0–0.2)
BASOPHILS NFR BLD AUTO: 0 %
EOSINOPHIL # BLD AUTO: 0.1 10E3/UL (ref 0–0.7)
EOSINOPHIL NFR BLD AUTO: 2 %
ERYTHROCYTE [DISTWIDTH] IN BLOOD BY AUTOMATED COUNT: 12.9 % (ref 10–15)
EST. AVERAGE GLUCOSE BLD GHB EST-MCNC: 126 MG/DL
HBA1C MFR BLD: 6 % (ref 0–5.6)
HCT VFR BLD AUTO: 38.9 % (ref 35–47)
HGB BLD-MCNC: 12.6 G/DL (ref 11.7–15.7)
IMM GRANULOCYTES # BLD: 0 10E3/UL
IMM GRANULOCYTES NFR BLD: 0 %
LYMPHOCYTES # BLD AUTO: 2.3 10E3/UL (ref 0.8–5.3)
LYMPHOCYTES NFR BLD AUTO: 33 %
MCH RBC QN AUTO: 30.5 PG (ref 26.5–33)
MCHC RBC AUTO-ENTMCNC: 32.4 G/DL (ref 31.5–36.5)
MCV RBC AUTO: 94 FL (ref 78–100)
MONOCYTES # BLD AUTO: 0.6 10E3/UL (ref 0–1.3)
MONOCYTES NFR BLD AUTO: 9 %
NEUTROPHILS # BLD AUTO: 3.8 10E3/UL (ref 1.6–8.3)
NEUTROPHILS NFR BLD AUTO: 56 %
PLATELET # BLD AUTO: 168 10E3/UL (ref 150–450)
RBC # BLD AUTO: 4.13 10E6/UL (ref 3.8–5.2)
WBC # BLD AUTO: 6.8 10E3/UL (ref 4–11)

## 2025-01-14 PROCEDURE — 83036 HEMOGLOBIN GLYCOSYLATED A1C: CPT | Performed by: PHYSICIAN ASSISTANT

## 2025-01-14 PROCEDURE — 11200 RMVL SKIN TAGS UP TO&INC 15: CPT | Performed by: PHYSICIAN ASSISTANT

## 2025-01-14 PROCEDURE — 84443 ASSAY THYROID STIM HORMONE: CPT | Performed by: PHYSICIAN ASSISTANT

## 2025-01-14 PROCEDURE — 36415 COLL VENOUS BLD VENIPUNCTURE: CPT | Performed by: PHYSICIAN ASSISTANT

## 2025-01-14 PROCEDURE — 80048 BASIC METABOLIC PNL TOTAL CA: CPT | Performed by: PHYSICIAN ASSISTANT

## 2025-01-14 PROCEDURE — 99214 OFFICE O/P EST MOD 30 MIN: CPT | Mod: 25 | Performed by: PHYSICIAN ASSISTANT

## 2025-01-14 PROCEDURE — 85025 COMPLETE CBC W/AUTO DIFF WBC: CPT | Performed by: PHYSICIAN ASSISTANT

## 2025-01-14 PROCEDURE — 80061 LIPID PANEL: CPT | Performed by: PHYSICIAN ASSISTANT

## 2025-01-14 RX ORDER — METFORMIN HYDROCHLORIDE 500 MG/1
TABLET, EXTENDED RELEASE ORAL
Qty: 90 TABLET | Refills: 1 | Status: SHIPPED | OUTPATIENT
Start: 2025-01-14

## 2025-01-14 RX ORDER — LISINOPRIL AND HYDROCHLOROTHIAZIDE 12.5; 2 MG/1; MG/1
1 TABLET ORAL DAILY
Qty: 90 TABLET | Refills: 1 | Status: SHIPPED | OUTPATIENT
Start: 2025-01-14

## 2025-01-14 RX ORDER — LEVOTHYROXINE SODIUM 88 UG/1
88 TABLET ORAL DAILY
Qty: 90 TABLET | Refills: 1 | Status: SHIPPED | OUTPATIENT
Start: 2025-01-14 | End: 2025-01-15

## 2025-01-14 NOTE — PROGRESS NOTES
Subjective   Yvonne is a 39 year old, presenting for the following health issues:  Hypertension and Thyroid Disease      1/14/2025    10:13 AM   Additional Questions   Roomed by Lilian Bolivar CMA   Accompanied by None         1/14/2025    10:13 AM   Patient Reported Additional Medications   Patient reports taking the following new medications none     Thyroid Disease    History of Present Illness       Reason for visit:  Med check   She is taking medications regularly.     Recheck of obesity. Discussed a lower calorie diet and consistent mix of aerobic exercise and strength training. This will help maintain/treat her blood pressure.  Ozempic worked to lose weight initially, but then she started gaining weight.   Some insomnia. Some noc sweats.   Some stress.     No fevers.   No chest pain/sob/palpitations  Some occasional ha's . Frontal /temporal .  Some occasional very mild  positional vertigo.    Review of Systems  Constitutional, HEENT, cardiovascular, pulmonary, GI, , musculoskeletal, neuro, skin, endocrine and psych systems are negative, except as otherwise noted.      Objective    LMP 12/15/2024 (Approximate)   There is no height or weight on file to calculate BMI.  Physical Exam   Eye exam - right eye normal lid, conjunctiva, cornea, pupil and fundus, left eye normal lid, conjunctiva, cornea, pupil and fundus.  Thyroid not palpable, not enlarged, no nodules detected.  CHEST:chest clear to IPPA, no tachypnea, retractions or cyanosis, and S1, S2 normal, no murmur, no gallop, rate regular.  No edema  2 skin tags left axilla and 2 on right neck. Each tx'd with one ftc of cryotherapy.      Yvonne was seen today for hypertension and thyroid disease.    Diagnoses and all orders for this visit:    Benign essential hypertension  -     BASIC METABOLIC PANEL; Future  -     lisinopril-hydrochlorothiazide (ZESTORETIC) 20-12.5 MG tablet; Take 1 tablet by mouth daily. If blood pressure above 130/80.--IF BP lower than  130/80 okay to take 1/2 tablet.  -     BASIC METABOLIC PANEL    Hyperlipidemia LDL goal <160  -     Lipid panel reflex to direct LDL Non-fasting; Future  -     Lipid panel reflex to direct LDL Non-fasting    Hypothyroidism due to Hashimoto's thyroiditis  -     TSH; Future  -     levothyroxine (SYNTHROID/LEVOTHROID) 88 MCG tablet; Take 1 tablet (88 mcg) by mouth daily.  -     TSH    Morbid obesity (H)  -     tirzepatide-Weight Management (ZEPBOUND) 2.5 MG/0.5ML prefilled pen; Inject 0.5 mLs (2.5 mg) subcutaneously every 7 days.  -     Adult Comprehensive Weight Management  Referral; Future    Night sweats  -     CBC with platelets and differential; Future  -     CBC with platelets and differential    Prediabetes  -     Hemoglobin A1c; Future  -     metFORMIN (GLUCOPHAGE XR) 500 MG 24 hr tablet; TAKE ONE TABLET BY MOUTH ONCE DAILY WITH DINNER  -     Hemoglobin A1c    Skin tag  -     DESTRUCT BENIGN LESION, UP TO 14    PCOS (polycystic ovarian syndrome)  -     metFORMIN (GLUCOPHAGE XR) 500 MG 24 hr tablet; TAKE ONE TABLET BY MOUTH ONCE DAILY WITH DINNER    Other orders  -     REVIEW OF HEALTH MAINTENANCE PROTOCOL ORDERS      Lower fat, higher fiber diet and consistent exercise.  Continue to work on a lower sugar/starch diet and more exercise.  Recheck in 6 mos   Signed Electronically by: Antnoio Lance PA-C

## 2025-01-15 LAB
ANION GAP SERPL CALCULATED.3IONS-SCNC: 13 MMOL/L (ref 7–15)
BUN SERPL-MCNC: 16.7 MG/DL (ref 6–20)
CALCIUM SERPL-MCNC: 9.3 MG/DL (ref 8.8–10.4)
CHLORIDE SERPL-SCNC: 102 MMOL/L (ref 98–107)
CHOLEST SERPL-MCNC: 166 MG/DL
CREAT SERPL-MCNC: 0.68 MG/DL (ref 0.51–0.95)
EGFRCR SERPLBLD CKD-EPI 2021: >90 ML/MIN/1.73M2
FASTING STATUS PATIENT QL REPORTED: YES
FASTING STATUS PATIENT QL REPORTED: YES
GLUCOSE SERPL-MCNC: 89 MG/DL (ref 70–99)
HCO3 SERPL-SCNC: 24 MMOL/L (ref 22–29)
HDLC SERPL-MCNC: 49 MG/DL
LDLC SERPL CALC-MCNC: 76 MG/DL
NONHDLC SERPL-MCNC: 117 MG/DL
POTASSIUM SERPL-SCNC: 4.5 MMOL/L (ref 3.4–5.3)
SODIUM SERPL-SCNC: 139 MMOL/L (ref 135–145)
TRIGL SERPL-MCNC: 204 MG/DL
TSH SERPL DL<=0.005 MIU/L-ACNC: 7.36 UIU/ML (ref 0.3–4.2)

## 2025-01-15 RX ORDER — LEVOTHYROXINE SODIUM 100 UG/1
100 TABLET ORAL DAILY
Qty: 90 TABLET | Refills: 0 | Status: SHIPPED | OUTPATIENT
Start: 2025-01-15

## 2025-01-16 ENCOUNTER — TELEPHONE (OUTPATIENT)
Dept: FAMILY MEDICINE | Facility: CLINIC | Age: 40
End: 2025-01-16
Payer: MEDICAID

## 2025-01-16 NOTE — TELEPHONE ENCOUNTER
PA  required, please contact plan at [phone]    Thank you,  Marlene Navas [facility]

## 2025-01-19 NOTE — TELEPHONE ENCOUNTER
PA Initiation    Medication: ZEPBOUND 2.5 MG/0.5ML SC SOAJ  Insurance Company: [employer] for [place] Medicaid Phone [phone] Fax [phone]  Pharmacy Filling the Rx: [facility] [place] [id number] [facility] [place]  Filling Pharmacy Phone: [phone]  Filling Pharmacy Fax: [phone]  Start Date: 1/19/2025

## 2025-01-20 DIAGNOSIS — E66.01 MORBID OBESITY (H): ICD-10-CM

## 2025-01-20 NOTE — TELEPHONE ENCOUNTER
Prior Authorization Approval    Medication: ZEPBOUND 2.5 MG/0.5ML SC SOAJ  Authorization Effective Date: 1/19/2025  Authorization Expiration Date: 4/19/2025  Approved Dose/Quantity:   Reference #:     Insurance Company: [employer] for [place] Medicaid Phone [phone] Fax [phone]  Expected CoPay: $    CoPay Card Available:      Financial Assistance Needed:   Which Pharmacy is filling the prescription: [facility] - [id number] [facility] [place]  Pharmacy Notified: YES  Patient Notified: **Instructed pharmacy to notify patient when script is ready to /ship.**

## 2025-01-22 ENCOUNTER — PATIENT OUTREACH (OUTPATIENT)
Dept: FAMILY MEDICINE | Facility: CLINIC | Age: 40
End: 2025-01-22
Payer: MEDICAID

## 2025-02-05 ENCOUNTER — PATIENT OUTREACH (OUTPATIENT)
Dept: CARE COORDINATION | Facility: CLINIC | Age: 40
End: 2025-02-05
Payer: MEDICAID

## 2025-02-13 ENCOUNTER — OFFICE VISIT (OUTPATIENT)
Dept: OBGYN | Facility: CLINIC | Age: 40
End: 2025-02-13
Payer: MEDICAID

## 2025-02-13 VITALS
HEART RATE: 89 BPM | SYSTOLIC BLOOD PRESSURE: 128 MMHG | BODY MASS INDEX: 44.41 KG/M2 | OXYGEN SATURATION: 98 % | HEIGHT: 68 IN | DIASTOLIC BLOOD PRESSURE: 76 MMHG | WEIGHT: 293 LBS

## 2025-02-13 DIAGNOSIS — Z01.419 ENCOUNTER FOR ANNUAL ROUTINE GYNECOLOGICAL EXAMINATION: ICD-10-CM

## 2025-02-13 DIAGNOSIS — D06.9 CIN III (CERVICAL INTRAEPITHELIAL NEOPLASIA III): Primary | ICD-10-CM

## 2025-02-13 NOTE — PATIENT INSTRUCTIONS
If you have any questions regarding your visit, Please contact your care team.     [employer] Access Services: [phone]  To Schedule an Appointment 24/7  Call: [phone] HOURS TELE[facility]ONE NUMBER     Meli Loredo APRN CNP      DainaEstelle Collazo-Surgery Scheduler  Sandra-Surgery Scheduler         Monday 7:30am-2:00pm    Tuesday 7:30am-4:00pm    Wednesday 7:30am-2:00pm    Thursday 7:30am-11:00am    Friday 7:30am-2:00pm [address] [place] [address] [place] [address]  Phone- [phone]   Fax- [phone]     Imaging Scheduling all locations  [phone]    [facility]  [address] [place] [address]   [place] [address] [phone]         Urgent Care locations:  [facility]   Monday-Friday  10 am - 8 pm  Saturday and Sunday   9 am - 5 pm     [phone]   If you need a medication refill, please contact your pharmacy. Please allow 3 business days for your refill to be completed.  As always, Thank you for trusting us with your healthcare needs!      see additional instructions from your care team below    Patient Education   Preventive Care Advice   This is general advice given by our system to help you stay healthy. However, your care team may have specific advice just for you. Please talk to your care team about your preventive care needs.  Nutrition  Eat 5 or more servings of fruits and vegetables each day.  Try wheat bread, brown rice and whole grain pasta (instead of white bread, rice, and pasta).  Get enough calcium and vitamin D. Check the label on foods and aim for 100% of the RDA (recommended daily allowance).  Lifestyle  Exercise at least 150 minutes each week  (30 minutes a day, 5 days a week).  Do muscle strengthening activities 2 days a week. These help control your weight and prevent disease.  No  smoking.  Wear sunscreen to prevent skin cancer.  Have a dental exam and cleaning every 6 months.  Yearly exams  See your health care team every year to talk about:  Any changes in your health.  Any medicines your care team has prescribed.  Preventive care, family planning, and ways to prevent chronic diseases.  Shots (vaccines)   HPV shots (up to age 26), if you've never had them before.  Hepatitis B shots (up to age 59), if you've never had them before.  COVID-19 shot: Get this shot when it's due.  Flu shot: Get a flu shot every year.  Tetanus shot: Get a tetanus shot every 10 years.  Pneumococcal, hepatitis A, and RSV shots: Ask your care team if you need these based on your risk.  Shingles shot (for age 50 and up)  General health tests  Diabetes screening:  Starting at age 35, Get screened for diabetes at least every 3 years.  If you are younger than age 35, ask your care team if you should be screened for diabetes.  Cholesterol test: At age 39, start having a cholesterol test every 5 years, or more often if advised.  Bone density scan (DEXA): At age 50, ask your care team if you should have this scan for osteoporosis (brittle bones).  Hepatitis C: Get tested at least once in your life.  STIs (sexually transmitted infections)  Before age 24: Ask your care team if you should be screened for STIs.  After age 24: Get screened for STIs if you're at risk. You are at risk for STIs (including HIV) if:  You are sexually active with more than one person.  You don't use condoms every time.  You or a partner was diagnosed with a sexually transmitted infection.  If you are at risk for HIV, ask about PrEP medicine to prevent HIV.  Get tested for HIV at least once in your life, whether you are at risk for HIV or not.  Cancer screening tests  Cervical cancer screening: If you have a cervix, begin getting regular cervical cancer screening tests starting at age 21.  Breast cancer scan (mammogram): If you've ever had breasts,  begin having regular mammograms starting at age 40. This is a scan to check for breast cancer.  Colon cancer screening: It is important to start screening for colon cancer at age 45.  Have a colonoscopy test every 10 years (or more often if you're at risk) Or, ask your provider about stool tests like a FIT test every year or Cologuard test every 3 years.  To learn more about your testing options, visit:   .  For help making a decision, visit:   https://bit.ly/go58000.  Prostate cancer screening test: If you have a prostate, ask your care team if a prostate cancer screening test (PSA) at age 55 is right for you.  Lung cancer screening: If you are a current or former smoker ages 50 to 80, ask your care team if ongoing lung cancer screenings are right for you.    For informational purposes only. Not to replace the advice of your health care provider. Copyright   2023 [facility] [employer]. All rights reserved. Clinically reviewed by the [facility] Transitions Program. [employer] [id number] - REV 01/24.

## 2025-02-13 NOTE — PROGRESS NOTES
SUBJECTIVE:   Yvonne Jordan  is a 39 year old, presenting for the following health issues:  Physical  HPI     Recent dose adjustment in her Synthroid. Does note some symptoms of difficulty maintaining sleep, some feelings of warmth, fatigue, mood changes.   Unsure what to attribute to her thyroid and what to attribute to her hormones.         1/14/2025    10:03 AM 2/9/2024     7:00 AM   PHQ-2 ( 1999 Pf[employer])   Q1: Little interest or pleasure in doing things 0 0   Q2: Feeling down, depressed or hopeless 0 0   PHQ-2 Score 0  0   Q1: Little interest or pleasure in doing things Not at all Not at all   Q2: Feeling down, depressed or hopeless Not at all Not at all   PHQ-2 Score 0 0       Patient-reported         Reviewed orders with patient.  Reviewed health maintenance and updated orders accordingly - Yes  Patient Active Problem List   Diagnosis    Hyperlipidemia LDL goal <160    Irregular menses    S/P breast augmentation    NATHAN 3 - cervical intraepithelial neoplasia grade 3    Obesity, unspecified obesity severity, unspecified obesity type    Hypothyroidism due to Hashimoto's thyroiditis    High risk HPV infection    Morbid obesity (H)    Fatty liver    Supervision of high risk pregnancy, antepartum    Postpartum care and examination     Past Surgical History:   Procedure Laterality Date    COLPOSCOPY, BIOPSY, COMBINED  2009    CONIZATION  2005    COSMETIC MAMMOPLASTY AUGMENTATION      HEAD & NECK SURGERY      wisdom teeth    MOUTH SURGERY      wisdom teeth extraction       Social History     Tobacco Use    Smoking status: Never    Smokeless tobacco: Never   Substance Use Topics    Alcohol use: Not Currently     Comment: very rare     Family History   Problem Relation Age of Onset    Arthritis Mother     Thyroid Disease Mother     Hypertension Father     Diabetes Father     Cancer Maternal Grandmother     Hypertension Maternal Grandmother     Cancer Paternal Grandmother     Lung Cancer Paternal Grandmother      "Cancer Paternal Grandfather         Non-Hodgkins Lymphoma           Breast Cancer Screening:   Mammogram Screening - Patient under 40 years of age: Routine Mammogram Screening not recommended.   Pertinent mammograms are reviewed under the imaging tab.    History of abnormal Pap smear: YES - reflected in Problem List and Health Maintenance accordingly    Past Medical History:   Diagnosis Date    Abnormal Pap smear     Cervical high risk HPV (human papillomavirus) test positive 8/2015, 11/25/16, 12/22/17    Neg 16/18    Hx of colposcopy with cervical biopsy 2/2/17    Bx & ECC - negative    Hypertension     Menstrual migraine     S/P cone biopsy of cervix 2/2006    HSIL NATHAN 2-3    Thyroid disease       Past Surgical History:   Procedure Laterality Date    COLPOSCOPY, BIOPSY, COMBINED  2009    CONIZATION  2005    COSMETIC MAMMOPLASTY AUGMENTATION      HEAD & NECK SURGERY      wisdom teeth    MOUTH SURGERY      wisdom teeth extraction         Review of Systems  Constitutional, neuro, ENT, endocrine, pulmonary, cardiac, gastrointestinal, genitourinary, musculoskeletal, integument and psychiatric systems are negative, except as otherwise noted.    OBJECTIVE:   /76 (BP Location: Right arm, Patient Position: Sitting, Cuff Size: Adult Large)   Pulse 89   Ht 1.721 m (5' 7.75\")   Wt 135.5 kg (298 lb 12.8 oz)   LMP 01/15/2025 (Approximate)   SpO2 98%   BMI 45.77 kg/m    Physical Exam   GENERAL: alert and no distress  RESP: lungs clear to auscultation - no rales, rhonchi or wheezes  BREAST: normal without masses, tenderness or nipple discharge and no palpable axillary masses or adenopathy  CV: regular rate and rhythm, normal S1 S2, no S3 or S4, no murmur, click or rub, no peripheral edema  ABDOMEN: soft, nontender, no hepatosplenomegaly, no masses and bowel sounds normal   (female) w/bimanual: normal female external genitalia, normal urethral meatus, normal vaginal mucosa, and normal cervix/adnexa/uterus without " masses or discharge  MS: no gross musculoskeletal defects noted, no edema  SKIN: no suspicious lesions or rashes  NEURO: Normal strength and tone, mentation intact and speech normal  PSYCH: mentation appears normal, affect normal/bright      ASSESSMENT/PLAN:   Encounter for annual routine gynecological examination  Health Maintenance reviewed. We discussed her symptoms and concerns. Some symptoms have started to improve since Synthroid dose was adjusted. She had questions about estrogen use and we discussed this taking her medical history into account.     NATHAN 3 - cervical intraepithelial neoplasia grade 3  Follow up based on result  - HPV and Gynecologic Cytology Panel    Counseling  Reviewed preventive health counseling, as reflected in patient instructions  Special attention given to:        Regular exercise       Healthy diet/nutrition       Contraception       (Tracy)menopause management      Signed Electronically by: CHEN Lee CNP

## 2025-02-19 LAB
BKR AP ASSOCIATED HPV REPORT: NORMAL
BKR LAB AP GYN ADEQUACY: NORMAL
BKR LAB AP GYN INTERPRETATION: NORMAL
BKR LAB AP PREVIOUS ABNL DX: NORMAL
BKR LAB AP PREVIOUS ABNORMAL: NORMAL
PATH REPORT.COMMENTS IMP SPEC: NORMAL
PATH REPORT.COMMENTS IMP SPEC: NORMAL
PATH REPORT.RELEVANT HX SPEC: NORMAL

## 2025-03-20 DIAGNOSIS — E66.01 MORBID OBESITY (H): ICD-10-CM

## 2025-03-20 RX ORDER — TIRZEPATIDE 5 MG/.5ML
INJECTION, SOLUTION SUBCUTANEOUS
Qty: 2 ML | Refills: 0 | Status: SHIPPED | OUTPATIENT
Start: 2025-03-20

## 2025-03-21 ENCOUNTER — TELEPHONE (OUTPATIENT)
Dept: FAMILY MEDICINE | Facility: CLINIC | Age: 40
End: 2025-03-21

## 2025-03-21 NOTE — TELEPHONE ENCOUNTER
Pts mother Kathrin (consent on file) calling in regarding the zepbound Rx.     Kathrin explains that somehow the pts PCP was switched over to an andover provider and that when pt was needing a refill and increae of the zepbound the prescription was somhow sent to this andover provider. Kathrin explains that the pharmacists says that because of this a PA had to be done. Pt and Kathrin are confused on next steps and what needs to happen.     RN reviewed chart and speaking again with Kathrin and Yvonne, Yvonne was already on the 5 mg dose of the zepbound for the month of February and didn't see much of weight loss, see [employer] message from 3/16. Refill request sent to PCP for next dose, pt was advised that next dose up was sent, but dose sent was the 5 mg again.     The 5 mg dose was sent by another provider.     Pt wondering if the 7.5 mg dose of the zepbound can be sent? Pt due to take injection today.     Nayla Lange RN

## 2025-04-03 ENCOUNTER — TELEPHONE (OUTPATIENT)
Dept: FAMILY MEDICINE | Facility: CLINIC | Age: 40
End: 2025-04-03

## 2025-04-03 NOTE — TELEPHONE ENCOUNTER
Prior Authorization Retail Medication Request    Medication/Dose: Zepbound 7.5mg/0.5ml   Diagnosis and ICD code (if different than what is on RX):    New/renewal/insurance change PA/secondary ins. PA:  Previously Tried and Failed:    Rationale:      Insurance   Primary: YRN VALENTIN  Insurance ID:  [id number]    Secondary (if applicable):  Insurance ID:      Pharmacy Information (if different than what is on RX)  [facility]  [facility]  Phone:  [phone]  Fax:[phone]    Clinic Information  Preferred routing pool for dept communication:

## 2025-04-03 NOTE — TELEPHONE ENCOUNTER
Retail Pharmacy Prior Authorization Team   Phone: [phone]    PA Initiation    Medication: Zepbound 7.5mg/0.5ml requires prior authorization  Insurance Company: [employer] - Phone [phone] Fax [phone]  Pharmacy Filling the Rx: [facility] - [address] [facility] [place]  Filling Pharmacy Phone: [phone]  Filling Pharmacy Fax: [phone]  Start Date: 4/3/2025

## 2025-04-04 NOTE — TELEPHONE ENCOUNTER
PRIOR AUTHORIZATION DENIED    Medication: Zepbound 7.5mg/0.5ml - DENIED    Denial Date: 4/4/2025    Denial Rational: INSURANCE STATES PATIENT MUST TRY/FAIL FORMULARY ALTERNATIVES - ITZEL SMITH.              Appeal Information:  IF PATIENT IS UNABLE TO TRY/FAIL ALTERNATIVE(S) PLEASE SUPPLY PA TEAM WITH A LETTER OF MEDICAL NECESSITY WITH CLINICAL REASON.

## 2025-04-10 DIAGNOSIS — E06.3 HYPOTHYROIDISM DUE TO HASHIMOTO'S THYROIDITIS: ICD-10-CM

## 2025-04-10 RX ORDER — LEVOTHYROXINE SODIUM 100 UG/1
100 TABLET ORAL DAILY
Qty: 90 TABLET | Refills: 3 | Status: SHIPPED | OUTPATIENT
Start: 2025-04-10

## 2025-06-03 ENCOUNTER — ANCILLARY PROCEDURE (OUTPATIENT)
Dept: GENERAL RADIOLOGY | Facility: CLINIC | Age: 40
End: 2025-06-03
Attending: PHYSICIAN ASSISTANT
Payer: COMMERCIAL

## 2025-06-03 ENCOUNTER — OFFICE VISIT (OUTPATIENT)
Dept: FAMILY MEDICINE | Facility: CLINIC | Age: 40
End: 2025-06-03
Payer: COMMERCIAL

## 2025-06-03 VITALS
HEART RATE: 109 BPM | OXYGEN SATURATION: 96 % | SYSTOLIC BLOOD PRESSURE: 126 MMHG | HEIGHT: 68 IN | RESPIRATION RATE: 20 BRPM | BODY MASS INDEX: 44.41 KG/M2 | TEMPERATURE: 98.5 F | DIASTOLIC BLOOD PRESSURE: 84 MMHG | WEIGHT: 293 LBS

## 2025-06-03 DIAGNOSIS — R20.2 PARESTHESIA OF UPPER LIMB: ICD-10-CM

## 2025-06-03 DIAGNOSIS — L30.1 DYSHIDROTIC ECZEMA: Primary | ICD-10-CM

## 2025-06-03 DIAGNOSIS — E66.01 MORBID OBESITY (H): ICD-10-CM

## 2025-06-03 DIAGNOSIS — E06.3 HYPOTHYROIDISM DUE TO HASHIMOTO'S THYROIDITIS: ICD-10-CM

## 2025-06-03 DIAGNOSIS — G56.03 BILATERAL CARPAL TUNNEL SYNDROME: ICD-10-CM

## 2025-06-03 PROCEDURE — 3079F DIAST BP 80-89 MM HG: CPT | Performed by: PHYSICIAN ASSISTANT

## 2025-06-03 PROCEDURE — 36415 COLL VENOUS BLD VENIPUNCTURE: CPT | Performed by: PHYSICIAN ASSISTANT

## 2025-06-03 PROCEDURE — 84439 ASSAY OF FREE THYROXINE: CPT | Performed by: PHYSICIAN ASSISTANT

## 2025-06-03 PROCEDURE — 99214 OFFICE O/P EST MOD 30 MIN: CPT | Performed by: PHYSICIAN ASSISTANT

## 2025-06-03 PROCEDURE — 3074F SYST BP LT 130 MM HG: CPT | Performed by: PHYSICIAN ASSISTANT

## 2025-06-03 PROCEDURE — 84443 ASSAY THYROID STIM HORMONE: CPT | Performed by: PHYSICIAN ASSISTANT

## 2025-06-03 PROCEDURE — 72040 X-RAY EXAM NECK SPINE 2-3 VW: CPT | Mod: TC | Performed by: STUDENT IN AN ORGANIZED HEALTH CARE EDUCATION/TRAINING PROGRAM

## 2025-06-03 RX ORDER — PHENTERMINE HYDROCHLORIDE 15 MG/1
15 CAPSULE ORAL EVERY MORNING
Qty: 30 CAPSULE | Refills: 0 | Status: SHIPPED | OUTPATIENT
Start: 2025-06-03

## 2025-06-03 RX ORDER — TRIAMCINOLONE ACETONIDE 1 MG/G
CREAM TOPICAL 2 TIMES DAILY
Qty: 80 G | Refills: 1 | Status: SHIPPED | OUTPATIENT
Start: 2025-06-03

## 2025-06-03 ASSESSMENT — ENCOUNTER SYMPTOMS: NUMBNESS: 1

## 2025-06-03 NOTE — TELEPHONE ENCOUNTER
Medication Appeal Initiation    We have initiated an appeal for the requested medication:  Medication: Zepbound 7.5mg/0.5ml - DENIED  Appeal Start Date:  6/3/2025  Insurance Company:  [employer]  Comments:  APPEAL LETTER FAXED TO INSURANCE.

## 2025-06-03 NOTE — LETTER
Contact isolation order placed under signed and held orders for day of surgery for +ESBL in urine 2019. Daksha 3, 2025      Yvonne Jordan  [address] [facility] [address]        To Whom It May Concern,       I have been working with Yvonne for years to help her lose weight. We have tried multiple medications (Phentermine, Metformin, Topiramate, Liraglutide, and Wegovy), many of which helped her to lose maybe 5 percent of her body weight. Wegovy seemed to work well initially, but then she suddenly started to gain weight while on it. As such, it was discontinued, and she was switched to Zepbound. As we titrated her to the 7.5 mg dose she had lost roughly 20 lbs. Unfortunately she changed insurances at the beginning of the year and her Zepbound was no longer covered and she was forced to stop it. I feel it is of utmost importance that she lose weight to benefit both her physical and mental health. I feel that it is medically necessary that Yvonne be allowed to restart and utilize Zepbound for the foreseeable future to aid in her task of losing weight.    Sincerely,        Antonio Lance PA-C    Electronically signed

## 2025-06-03 NOTE — PROGRESS NOTES
"    Shankar Russell is a 40 year old, presenting for the following health issues:  Derm Problem (Rash on fingers), Weight Problem (Discuss weight loss), and Numbness (Tingling in bilateral arms x 2 days)        6/3/2025    10:52 AM   Additional Questions   Roomed by Lilian Bolivar CMA   Accompanied by None         6/3/2025    10:52 AM   Patient Reported Additional Medications   Patient reports taking the following new medications none     Numbness  Associated symptoms include numbness.   History of Present Illness       Reason for visit:  Ecezema on fingers and tingling in hands and arms  Symptom onset:  1-2 weeks ago  Symptoms include:  Rash/blister on fingers  Symptom intensity:  Severe  Symptom progression:  Staying the same  Had these symptoms before:  Yes  Has tried/received treatment for these symptoms:  No   She is taking medications regularly.      Tapioca like seeds under the skin of her fingers. Highly pruritic.    Bilateral forearm and wrist paresthesias. No pain. ? Weakness. Gripping and grasping activities make it worse.   No neck pain.     Recheck of obesity. Discussed a lower calorie diet and consistent mix of aerobic exercise and strength training. This will help maintain/treat his blood pressure.        Review of Systems  Constitutional, HEENT, cardiovascular, pulmonary, GI, , musculoskeletal, neuro, skin, endocrine and psych systems are negative, except as otherwise noted.      Objective    /84   Pulse 109   Temp 98.5  F (36.9  C) (Oral)   Resp 20   Ht 1.721 m (5' 7.75\")   Wt (!) 138.8 kg (306 lb)   LMP 05/06/2025 (Approximate)   SpO2 96%   BMI 46.87 kg/m    Body mass index is 46.87 kg/m .  Physical Exam   Eye exam - right eye normal lid, conjunctiva, cornea, pupil and fundus, left eye normal lid, conjunctiva, cornea, pupil and fundus.  Thyroid not palpable, not enlarged, no nodules detected.  CHEST:chest clear to IPPA, no tachypnea, retractions or cyanosis, and S1, S2 normal, " no murmur, no gallop, rate regular.  Neck rom normal. No pain. Negative spurlings test.  WRISTS: Exam reveals positive Phalen and Tinel sign on bilateral. There is no muscle atrophy noted. Strength and sensation are normal.  Yvonne was seen today for derm problem, weight problem and numbness.    Diagnoses and all orders for this visit:    Dyshidrotic eczema  -     triamcinolone (KENALOG) 0.1 % external cream; Apply topically 2 times daily.    Morbid obesity (H)  -     tirzepatide (MOUNJARO) 2.5 MG/0.5ML SOAJ auto-injector pen; Inject 0.5 mLs (2.5 mg) subcutaneously once a week.  -     phentermine 15 MG capsule; Take 1 capsule (15 mg) by mouth every morning.    Bilateral carpal tunnel syndrome    Paresthesia of upper limb  -     XR Cervical Spine 2/3 Views; Future  -     EMG; Future    Hypothyroidism due to Hashimoto's thyroiditis  -     TSH with free T4 reflex; Future  -     TSH with free T4 reflex    Advised supportive and symptomatic treatment.  Follow up with Provider - if condition persists or worsens.   work on lifestyle modification      Signed Electronically by: Antonio Lance PA-C

## 2025-06-04 ENCOUNTER — TELEPHONE (OUTPATIENT)
Dept: FAMILY MEDICINE | Facility: CLINIC | Age: 40
End: 2025-06-04
Payer: COMMERCIAL

## 2025-06-04 LAB
T4 FREE SERPL-MCNC: 1.03 NG/DL (ref 0.9–1.7)
TSH SERPL DL<=0.005 MIU/L-ACNC: 4.21 UIU/ML (ref 0.3–4.2)

## 2025-06-04 NOTE — TELEPHONE ENCOUNTER
Central Prior Authorization Team - Phone: [phone]     PRIOR AUTHORIZATION DENIED    Medication: MOUNJARO 2.5 MG/0.5ML SC SOAJ  Insurance Company: [employer] - Phone [phone] Fax [phone]  Denial Date: 6/4/2025  Denial Reason(s):       Appeal Information:       Patient Notified: NO  Unfortunately, we cannot call the patient with denials because we do not know what next steps the MD will take nor can we give medical advice, please notify the patient of what they are to expect for the continuation of their therapy from the provider.

## 2025-06-13 ENCOUNTER — RESULTS FOLLOW-UP (OUTPATIENT)
Dept: FAMILY MEDICINE | Facility: CLINIC | Age: 40
End: 2025-06-13

## 2025-07-07 ENCOUNTER — MYC MEDICAL ADVICE (OUTPATIENT)
Dept: FAMILY MEDICINE | Facility: CLINIC | Age: 40
End: 2025-07-07
Payer: COMMERCIAL

## 2025-07-08 NOTE — TELEPHONE ENCOUNTER
Unable to find in chart a need for follow up for 3 more months     Please advise    Celine Ivory RN